# Patient Record
Sex: FEMALE | Race: WHITE | Employment: FULL TIME | URBAN - METROPOLITAN AREA
[De-identification: names, ages, dates, MRNs, and addresses within clinical notes are randomized per-mention and may not be internally consistent; named-entity substitution may affect disease eponyms.]

---

## 2017-01-03 ENCOUNTER — NURSE ONLY (OUTPATIENT)
Dept: INTERNAL MEDICINE CLINIC | Facility: CLINIC | Age: 57
End: 2017-01-03

## 2017-01-03 ENCOUNTER — APPOINTMENT (OUTPATIENT)
Dept: LAB | Age: 57
End: 2017-01-03
Attending: INTERNAL MEDICINE

## 2017-01-03 DIAGNOSIS — Z13.89 SCREENING FOR BLOOD OR PROTEIN IN URINE: ICD-10-CM

## 2017-01-03 DIAGNOSIS — Z13.89 SCREENING FOR BLOOD OR PROTEIN IN URINE: Primary | ICD-10-CM

## 2017-01-05 ENCOUNTER — OFFICE VISIT (OUTPATIENT)
Dept: INTERNAL MEDICINE CLINIC | Facility: CLINIC | Age: 57
End: 2017-01-05

## 2017-01-05 VITALS
WEIGHT: 167.75 LBS | OXYGEN SATURATION: 98 % | BODY MASS INDEX: 26.33 KG/M2 | HEIGHT: 67 IN | SYSTOLIC BLOOD PRESSURE: 126 MMHG | DIASTOLIC BLOOD PRESSURE: 78 MMHG | RESPIRATION RATE: 16 BRPM | TEMPERATURE: 98 F | HEART RATE: 76 BPM

## 2017-01-05 DIAGNOSIS — J06.9 UPPER RESPIRATORY TRACT INFECTION, UNSPECIFIED TYPE: Primary | ICD-10-CM

## 2017-01-05 PROCEDURE — 99213 OFFICE O/P EST LOW 20 MIN: CPT | Performed by: INTERNAL MEDICINE

## 2017-01-05 NOTE — PROGRESS NOTES
Omari Miller  1960 is a 64year old female who presents for upper respiratory symptoms    Patient presents with:  Swollen Glands: pt states all her glands, throat and shoulder all hurt for the last week        HPI:   Pt reports  respiratory symptom stenosis of spine    • Hyperlipidemia    • Varicose vein    • Sciatica 9/25/2013   • Rib fracture 6/30/2013   • Osteoporosis 2/18/2014   • Degeneration of lumbar or lumbosacral intervertebral disc 9/25/2013   • Spinal stenosis, lumbar region, without neuro indicates understanding of these issues and agrees to the plan. The patient is asked to Return in about 1 week (around 1/12/2017), or if symptoms worsen or fail to improve. Elisa Buerger, MD

## 2017-02-01 ENCOUNTER — TELEPHONE (OUTPATIENT)
Dept: INTERNAL MEDICINE CLINIC | Facility: CLINIC | Age: 57
End: 2017-02-01

## 2017-02-01 NOTE — TELEPHONE ENCOUNTER
MD received [facility] papers. Per VM, pt needs to come into office for papers to be filled out. LMTCB to schedule appointment.

## 2017-02-02 NOTE — TELEPHONE ENCOUNTER
Future Appointments  Date Time Provider Christelle Engel   2/3/2017 2:15 PM Rin Alejo MD [facility] [place]

## 2017-02-03 ENCOUNTER — OFFICE VISIT (OUTPATIENT)
Dept: INTERNAL MEDICINE CLINIC | Facility: CLINIC | Age: 57
End: 2017-02-03

## 2017-02-03 DIAGNOSIS — R07.89 LEFT-SIDED CHEST WALL PAIN: Primary | ICD-10-CM

## 2017-02-03 PROCEDURE — 99213 OFFICE O/P EST LOW 20 MIN: CPT | Performed by: INTERNAL MEDICINE

## 2017-02-03 RX ORDER — HYDROCODONE BITARTRATE AND ACETAMINOPHEN 10; 325 MG/1; MG/1
1 TABLET ORAL 3 TIMES DAILY
Qty: 90 TABLET | Refills: 0 | Status: SHIPPED | OUTPATIENT
Start: 2017-02-03 | End: 2017-03-02

## 2017-02-03 NOTE — PROGRESS NOTES
Anna Marie Berkowitz  1960 is a 64year old female. Patient presents with:  Paperwork:  FMLA      HPI:   FMLA form     to be filled out           Current Outpatient Prescriptions:  HYDROcodone-acetaminophen (NORCO)  MG Oral Tab Take 1 tablet by nabil intervertebral disc 9/25/2013   • Spinal stenosis, lumbar region, without neurogenic claudication 9/25/2013   • Carpal tunnel syndrome 6/20/2013     Log Date: 11/28/2012    • Recurrent pneumonia    • Migraines    • Ventral hernia 2646   • Follicular cyst o

## 2017-02-08 RX ORDER — POTASSIUM CHLORIDE 20 MEQ/1
TABLET, EXTENDED RELEASE ORAL
Qty: 30 TABLET | Refills: 0 | Status: SHIPPED | OUTPATIENT
Start: 2017-02-08 | End: 2017-07-12

## 2017-02-08 RX ORDER — FUROSEMIDE 80 MG
TABLET ORAL
Qty: 30 TABLET | Refills: 0 | Status: SHIPPED | OUTPATIENT
Start: 2017-02-08 | End: 2017-07-12

## 2017-02-10 ENCOUNTER — TELEPHONE (OUTPATIENT)
Dept: INTERNAL MEDICINE CLINIC | Facility: CLINIC | Age: 57
End: 2017-02-10

## 2017-02-10 NOTE — TELEPHONE ENCOUNTER
MD received/reviewed reasonable accommodation request forms. Forms filled out, signed by VM and pt notified that forms can be picked up at the . Pt verbalized understanding.

## 2017-03-02 ENCOUNTER — TELEPHONE (OUTPATIENT)
Dept: INTERNAL MEDICINE CLINIC | Facility: CLINIC | Age: 57
End: 2017-03-02

## 2017-03-02 DIAGNOSIS — R07.89 LEFT-SIDED CHEST WALL PAIN: Primary | ICD-10-CM

## 2017-03-02 RX ORDER — HYDROCODONE BITARTRATE AND ACETAMINOPHEN 10; 325 MG/1; MG/1
1 TABLET ORAL 3 TIMES DAILY
Qty: 90 TABLET | Refills: 0 | Status: SHIPPED | OUTPATIENT
Start: 2017-03-02 | End: 2017-04-05

## 2017-03-02 NOTE — TELEPHONE ENCOUNTER
VM: please review last OV notes below from pain management. Please indicate if you are comfortable managing pts pain medications. Thank you!    Medical Decision Making:    Diagnosis:    Left-sided chest wall pain  (primary encounter diagnosis)  Closed fract

## 2017-03-14 RX ORDER — LIDOCAINE 50 MG/G
PATCH TOPICAL
Qty: 30 PATCH | Refills: 0 | Status: SHIPPED | OUTPATIENT
Start: 2017-03-14 | End: 2017-07-12

## 2017-03-14 NOTE — TELEPHONE ENCOUNTER
rx last filled 9/21/16 for a qty of 30 patches and 0 additional refill's   Pt's last ov was on 3/2/17

## 2017-03-24 ENCOUNTER — TELEPHONE (OUTPATIENT)
Dept: INTERNAL MEDICINE CLINIC | Facility: CLINIC | Age: 57
End: 2017-03-24

## 2017-03-24 DIAGNOSIS — M79.642 LEFT HAND PAIN: Primary | ICD-10-CM

## 2017-03-24 NOTE — TELEPHONE ENCOUNTER
Pt called in looking for referral to Dr Sowmya Farias office for pain in the L hand Referral entered and approved through pts insurance company in [facility]. Pt notified.

## 2017-04-05 DIAGNOSIS — R07.89 LEFT-SIDED CHEST WALL PAIN: Primary | ICD-10-CM

## 2017-04-05 PROBLEM — M65.341 TRIGGER FINGER, RIGHT RING FINGER: Status: ACTIVE | Noted: 2017-04-05

## 2017-04-05 PROBLEM — M65.321 TRIGGER FINGER, RIGHT INDEX FINGER: Status: ACTIVE | Noted: 2017-04-05

## 2017-04-05 PROBLEM — M65.332 TRIGGER FINGER, LEFT MIDDLE FINGER: Status: ACTIVE | Noted: 2017-04-05

## 2017-04-05 PROBLEM — M65.311 TRIGGER FINGER OF RIGHT THUMB: Status: ACTIVE | Noted: 2017-04-05

## 2017-04-05 NOTE — TELEPHONE ENCOUNTER
Pt called in looking for refill on norco.     Last refill 3/2/17 #90. Medication pending, ok to refill?

## 2017-04-06 ENCOUNTER — TELEPHONE (OUTPATIENT)
Dept: INTERNAL MEDICINE CLINIC | Facility: CLINIC | Age: 57
End: 2017-04-06

## 2017-04-06 RX ORDER — HYDROCODONE BITARTRATE AND ACETAMINOPHEN 10; 325 MG/1; MG/1
1 TABLET ORAL 3 TIMES DAILY
Qty: 90 TABLET | Refills: 0 | Status: SHIPPED | OUTPATIENT
Start: 2017-04-06 | End: 2017-05-04

## 2017-05-01 PROBLEM — G89.29 OTHER CHRONIC PAIN: Status: ACTIVE | Noted: 2017-05-01

## 2017-05-04 ENCOUNTER — OFFICE VISIT (OUTPATIENT)
Dept: INTERNAL MEDICINE CLINIC | Facility: CLINIC | Age: 57
End: 2017-05-04

## 2017-05-04 VITALS
BODY MASS INDEX: 25.74 KG/M2 | TEMPERATURE: 98 F | DIASTOLIC BLOOD PRESSURE: 80 MMHG | OXYGEN SATURATION: 99 % | HEIGHT: 67 IN | WEIGHT: 164 LBS | HEART RATE: 69 BPM | SYSTOLIC BLOOD PRESSURE: 118 MMHG | RESPIRATION RATE: 15 BRPM

## 2017-05-04 DIAGNOSIS — R07.89 LEFT-SIDED CHEST WALL PAIN: Primary | ICD-10-CM

## 2017-05-04 PROCEDURE — 99214 OFFICE O/P EST MOD 30 MIN: CPT | Performed by: INTERNAL MEDICINE

## 2017-05-04 RX ORDER — CYCLOBENZAPRINE HCL 10 MG
10 TABLET ORAL 3 TIMES DAILY PRN
Qty: 30 TABLET | Refills: 0 | Status: SHIPPED | OUTPATIENT
Start: 2017-05-04 | End: 2018-03-08 | Stop reason: ALTCHOICE

## 2017-05-04 RX ORDER — HYDROCODONE BITARTRATE AND ACETAMINOPHEN 10; 325 MG/1; MG/1
1 TABLET ORAL 3 TIMES DAILY
Qty: 90 TABLET | Refills: 0 | Status: SHIPPED | OUTPATIENT
Start: 2017-05-04 | End: 2017-06-01

## 2017-05-04 RX ORDER — HYDROCODONE BITARTRATE AND ACETAMINOPHEN 10; 325 MG/1; MG/1
1 TABLET ORAL 3 TIMES DAILY
Qty: 90 TABLET | Refills: 0 | Status: CANCELLED | OUTPATIENT
Start: 2017-05-04

## 2017-05-04 NOTE — PATIENT INSTRUCTIONS
- Continue with Norco  - Take Flexeril every 8 hours as needed. Take it at night only at first to see how it affects you drowsiness wise. It was a pleasure seeing you in the clinic today.   Thank you for choosing the [facility]

## 2017-05-04 NOTE — PROGRESS NOTES
Reena Hernadez is a 64year old female. HPI:   Patient presents with:  Back Pain: left side upper back pain, started yesterday, sharp pain   Patient presents with an acute musculoskeletal complaint. Patient has been dealing with pain in left lower back. morning before breakfast., Disp: 30 tablet, Rfl: 11  •  Losartan Potassium (COZAAR) 50 MG Oral Tab, Take 50 mg by mouth daily.   , Disp: , Rfl: 2  •  SUMAtriptan Succinate (IMITREX) 50 MG Oral Tab, TAKE 1 TABLET BY MOUTH AT ONSET OF HEADACHE, MAX OF 2 TABLE (2/15/2013); drain/inject medium joint/bursa (2/15/2013); fluoroscopic guidance needle placement (2/15/2013); nakul by andrea corey [facility] 5+ yr (2/15/2013); drain/inject medium joint/bursa (3/1/2013); drain/inject medium joint/bursa (3/1/2013); nakul 12/29/2014); m-sedaj by sm phys perfrmg svc 5+ yr (N/A, 12/29/2014); injection, w/wo contrast, dx/therapeutic substance, epidural/subarachnoid; lumbar/sacral (N/A, 1/26/2015); fluor gid & loclzj ndl/cath spi dx/ther njx (N/A, 1/26/2015); inject nerv akira,p lb  BMI 25.68 kg/m2  SpO2 99%  Breastfeeding?  No  GENERAL: Alert and oriented, well developed, well nourished,in no apparent distress  HEENT: atraumatic, PERRLA, EOMI, normal lid and conjunctiva  LUNGS: clear to auscultation bilaterally, no wheezing/rubs

## 2017-05-04 NOTE — TELEPHONE ENCOUNTER
Pt looking for refill on norco  mg 1 tab TID #90. Last refill 4/6/17. Last OV 2/3/17. Pt is being seen by pain management but norco is being RX by VM. Please see TE from 3/2/17.

## 2017-05-09 ENCOUNTER — OFFICE VISIT (OUTPATIENT)
Dept: INTERNAL MEDICINE CLINIC | Facility: CLINIC | Age: 57
End: 2017-05-09

## 2017-05-09 VITALS
HEIGHT: 67 IN | WEIGHT: 164 LBS | OXYGEN SATURATION: 97 % | TEMPERATURE: 99 F | DIASTOLIC BLOOD PRESSURE: 80 MMHG | BODY MASS INDEX: 25.74 KG/M2 | HEART RATE: 70 BPM | SYSTOLIC BLOOD PRESSURE: 118 MMHG | RESPIRATION RATE: 16 BRPM

## 2017-05-09 DIAGNOSIS — J06.9 UPPER RESPIRATORY TRACT INFECTION, UNSPECIFIED TYPE: Primary | ICD-10-CM

## 2017-05-09 PROCEDURE — 99213 OFFICE O/P EST LOW 20 MIN: CPT | Performed by: INTERNAL MEDICINE

## 2017-05-09 RX ORDER — AMOXICILLIN AND CLAVULANATE POTASSIUM 500; 125 MG/1; MG/1
1 TABLET, FILM COATED ORAL 2 TIMES DAILY
Qty: 20 TABLET | Refills: 0 | Status: SHIPPED | OUTPATIENT
Start: 2017-05-09 | End: 2017-05-19

## 2017-05-09 NOTE — PROGRESS NOTES
Mike CHEEK 1960 is a 64year old female who presents for upper respiratory symptoms    Patient presents with:  Chest Congestion        HPI:   Pt reports  respiratory symptoms for  Few days   .        Current Outpatient Prescriptions:  Amoxicillin Bilateral    • Cervical stenosis of spine    • Hyperlipidemia    • Varicose vein    • Sciatica 9/25/2013   • Rib fracture 6/30/2013   • Osteoporosis 2/18/2014   • Degeneration of lumbar or lumbosacral intervertebral disc 9/25/2013   • Spinal stenosis, lumb daily. Patient Instructions   Throat lozenges plenty of fluids      The patient indicates understanding of these issues and agrees to the plan. The patient is asked to Return if symptoms worsen or fail to improve. Pablo Ch MD

## 2017-05-11 ENCOUNTER — TELEPHONE (OUTPATIENT)
Dept: INTERNAL MEDICINE CLINIC | Facility: CLINIC | Age: 57
End: 2017-05-11

## 2017-05-11 NOTE — TELEPHONE ENCOUNTER
Procedure spinal cord stem implant on May 23,2017 Dr. Phyllis Salmeron needs ov, ekg and bmp paper in dr.motianis amber vargas.

## 2017-05-18 ENCOUNTER — HOSPITAL ENCOUNTER (OUTPATIENT)
Dept: GENERAL RADIOLOGY | Age: 57
Discharge: HOME | End: 2017-05-18
Attending: INTERNAL MEDICINE
Payer: COMMERCIAL

## 2017-05-18 ENCOUNTER — OFFICE VISIT (OUTPATIENT)
Dept: INTERNAL MEDICINE CLINIC | Facility: CLINIC | Age: 57
End: 2017-05-18

## 2017-05-18 VITALS
WEIGHT: 166.5 LBS | TEMPERATURE: 98 F | HEART RATE: 77 BPM | SYSTOLIC BLOOD PRESSURE: 140 MMHG | DIASTOLIC BLOOD PRESSURE: 86 MMHG | HEIGHT: 67 IN | RESPIRATION RATE: 22 BRPM | BODY MASS INDEX: 26.13 KG/M2 | OXYGEN SATURATION: 97 %

## 2017-05-18 DIAGNOSIS — J20.9 ACUTE BRONCHITIS, UNSPECIFIED ORGANISM: Primary | ICD-10-CM

## 2017-05-18 DIAGNOSIS — J20.9 ACUTE BRONCHITIS, UNSPECIFIED ORGANISM: ICD-10-CM

## 2017-05-18 PROCEDURE — 71020 XR CHEST PA + LAT CHEST (CPT=71020): CPT | Performed by: INTERNAL MEDICINE

## 2017-05-18 PROCEDURE — 99213 OFFICE O/P EST LOW 20 MIN: CPT | Performed by: INTERNAL MEDICINE

## 2017-05-18 RX ORDER — LEVOFLOXACIN 500 MG/1
500 TABLET, FILM COATED ORAL DAILY
Qty: 10 TABLET | Refills: 0 | Status: SHIPPED | OUTPATIENT
Start: 2017-05-18 | End: 2017-05-28

## 2017-05-18 RX ORDER — ALBUTEROL SULFATE 90 UG/1
2 AEROSOL, METERED RESPIRATORY (INHALATION) EVERY 6 HOURS PRN
Qty: 2 INHALER | Refills: 0 | Status: SHIPPED | OUTPATIENT
Start: 2017-05-18 | End: 2017-06-17

## 2017-05-18 RX ORDER — PREDNISONE 1 MG/1
TABLET ORAL
Qty: 70 TABLET | Refills: 0 | Status: SHIPPED | OUTPATIENT
Start: 2017-05-18 | End: 2017-05-26

## 2017-05-18 NOTE — PROGRESS NOTES
Reena Hernadez  1960 is a 64year old female who presents for upper respiratory symptoms    Patient presents with:  Cough: URI        HPI:   Pt reports  respiratory symptoms for  Few days   Cough with yellowish expectoration no fever.        Current O Rfl: 2   Albuterol Sulfate HFA (VENTOLIN) 108 (90 BASE) MCG/ACT Inhalation Aero Soln Inhale 2 puffs into the lungs every 6 (six) hours as needed. Disp:  Rfl:    Penciclovir (DENAVIR) 1 % Apply Externally Cream Apply  topically every 2 (two) hours.  For cold without murmur  GI: good BS's,no masses, HSM or tenderness    ASSESSMENT AND PLAN:   Olamide Acosta was seen today for cough. Diagnoses and all orders for this visit:    Acute bronchitis, unspecified organism  -     levofloxacin (LEVAQUIN) 500 MG Oral Tab;  Take

## 2017-05-26 ENCOUNTER — OFFICE VISIT (OUTPATIENT)
Dept: INTERNAL MEDICINE CLINIC | Facility: CLINIC | Age: 57
End: 2017-05-26

## 2017-05-26 VITALS
BODY MASS INDEX: 26.13 KG/M2 | TEMPERATURE: 99 F | HEART RATE: 78 BPM | OXYGEN SATURATION: 97 % | DIASTOLIC BLOOD PRESSURE: 88 MMHG | SYSTOLIC BLOOD PRESSURE: 126 MMHG | WEIGHT: 166.5 LBS | RESPIRATION RATE: 16 BRPM | HEIGHT: 67 IN

## 2017-05-26 DIAGNOSIS — J20.9 ACUTE BRONCHITIS, UNSPECIFIED ORGANISM: Primary | ICD-10-CM

## 2017-05-26 PROCEDURE — 99213 OFFICE O/P EST LOW 20 MIN: CPT | Performed by: INTERNAL MEDICINE

## 2017-05-26 RX ORDER — PREDNISONE 1 MG/1
TABLET ORAL
Qty: 70 TABLET | Refills: 0 | COMMUNITY
Start: 2017-05-26 | End: 2017-06-01

## 2017-05-26 NOTE — PROGRESS NOTES
Sienna Veras  1960 is a 64year old female who presents for upper respiratory symptoms    Patient presents with:   Follow - Up        HPI:   Much better      Current Outpatient Prescriptions:  predniSONE 5 MG Oral Tab 10 mg twice daily for 3 days, 5 % Apply Externally Cream Apply  topically every 2 (two) hours.  For cold sores only as needed Disp:  Rfl:       Past Medical History   Diagnosis Date   • Esophageal reflux    • Renal stones    • DJD (degenerative joint disease) of hip      Bilateral    • Ce organism        Patient Instructions   Return to clinic prn if no better         The patient indicates understanding of these issues and agrees to the plan. The patient is asked to Return if symptoms worsen or fail to improve. Emilie Gonzalez MD

## 2017-06-01 ENCOUNTER — OFFICE VISIT (OUTPATIENT)
Dept: INTERNAL MEDICINE CLINIC | Facility: CLINIC | Age: 57
End: 2017-06-01

## 2017-06-01 VITALS
RESPIRATION RATE: 16 BRPM | DIASTOLIC BLOOD PRESSURE: 84 MMHG | TEMPERATURE: 98 F | HEIGHT: 67 IN | WEIGHT: 166.5 LBS | BODY MASS INDEX: 26.13 KG/M2 | SYSTOLIC BLOOD PRESSURE: 126 MMHG | OXYGEN SATURATION: 96 % | HEART RATE: 82 BPM

## 2017-06-01 DIAGNOSIS — J20.9 ACUTE BRONCHITIS, UNSPECIFIED ORGANISM: ICD-10-CM

## 2017-06-01 DIAGNOSIS — Z01.818 PREOP EXAM FOR INTERNAL MEDICINE: Primary | ICD-10-CM

## 2017-06-01 PROCEDURE — 99213 OFFICE O/P EST LOW 20 MIN: CPT | Performed by: INTERNAL MEDICINE

## 2017-06-01 PROCEDURE — 93000 ELECTROCARDIOGRAM COMPLETE: CPT | Performed by: INTERNAL MEDICINE

## 2017-06-01 RX ORDER — LEVOFLOXACIN 500 MG/1
500 TABLET, FILM COATED ORAL DAILY
Qty: 10 TABLET | Refills: 0 | Status: SHIPPED | OUTPATIENT
Start: 2017-06-01 | End: 2017-06-11

## 2017-06-01 RX ORDER — PREDNISONE 1 MG/1
TABLET ORAL
Qty: 70 TABLET | Refills: 0 | Status: SHIPPED | OUTPATIENT
Start: 2017-06-01 | End: 2017-10-19 | Stop reason: ALTCHOICE

## 2017-06-01 NOTE — TELEPHONE ENCOUNTER
[facility] calling requesting med refill for Penciclovir (DENAVIR) 1 % Apply Externally Cream

## 2017-06-01 NOTE — PROGRESS NOTES
Shital Clifford  1960 is a 64year old female.     Patient presents with:  Pre-Op Exam: spinal cord stin inplant, 17, Dr Jamie Arguello      HPI:   He  has had previous anesthesia:  yes  Previous complications:  none     Current Outpatient Prescriptions: For cold sores only as needed Disp:  Rfl:       Past Medical History   Diagnosis Date   • Esophageal reflux    • Renal stones    • DJD (degenerative joint disease) of hip      Bilateral    • Cervical stenosis of spine    • Hyperlipidemia    • Varicose vein Palpitations none. PND (paroxsymal nocturnal dyspnea) none.    Gastrointestinal:   Patient denies abdominal pain, blood in stool, constipation, diarrhea, difficulty swallowing, change in stools, heartburn, nausea, vomiting no weight changes noted no heart b medicine  -     Comp Metabolic Panel (14); Future  -     EKG with interpretation and Report -IN OFFICE [20470]    Acute bronchitis, unspecified organism  -     levofloxacin (LEVAQUIN) 500 MG Oral Tab; Take 1 tablet (500 mg total) by mouth daily.   -     pre

## 2017-06-05 ENCOUNTER — TELEPHONE (OUTPATIENT)
Dept: INTERNAL MEDICINE CLINIC | Facility: CLINIC | Age: 57
End: 2017-06-05

## 2017-06-05 RX ORDER — FLUCONAZOLE 100 MG/1
100 TABLET ORAL DAILY
Qty: 3 TABLET | Refills: 0 | Status: SHIPPED | OUTPATIENT
Start: 2017-06-05 | End: 2017-06-08

## 2017-06-05 NOTE — TELEPHONE ENCOUNTER
Pt called in stating that since she has been taking abx and prednisone she has developed thrush. Pt is looking for medication to be RX. Allergies to motrin and IV dye.  Pt using [facility]

## 2017-06-21 ENCOUNTER — OFFICE VISIT (OUTPATIENT)
Dept: INTERNAL MEDICINE CLINIC | Facility: CLINIC | Age: 57
End: 2017-06-21

## 2017-06-21 VITALS
RESPIRATION RATE: 20 BRPM | HEIGHT: 67 IN | SYSTOLIC BLOOD PRESSURE: 120 MMHG | DIASTOLIC BLOOD PRESSURE: 82 MMHG | HEART RATE: 96 BPM | WEIGHT: 168.5 LBS | BODY MASS INDEX: 26.45 KG/M2 | TEMPERATURE: 98 F | OXYGEN SATURATION: 99 %

## 2017-06-21 DIAGNOSIS — J31.0 CHRONIC RHINITIS: Primary | ICD-10-CM

## 2017-06-21 PROCEDURE — 99213 OFFICE O/P EST LOW 20 MIN: CPT | Performed by: PHYSICIAN ASSISTANT

## 2017-06-21 RX ORDER — FLUTICASONE PROPIONATE 50 MCG
2 SPRAY, SUSPENSION (ML) NASAL DAILY
Qty: 1 BOTTLE | Refills: 0 | Status: SHIPPED | OUTPATIENT
Start: 2017-06-21

## 2017-06-21 RX ORDER — AZELASTINE 1 MG/ML
2 SPRAY, METERED NASAL 2 TIMES DAILY
Qty: 1 BOTTLE | Refills: 0 | Status: ON HOLD | OUTPATIENT
Start: 2017-06-21 | End: 2018-03-12

## 2017-06-21 NOTE — PROGRESS NOTES
HPI:  Gia Olivo is a 64year old female who presents for sinus symptoms x 1 month. C/o ears feeling full, decreased/muffled hearing, feels like she's on an airplane, bilat ear pain, nasal congestion, sinus pressure, frontal headache.   Denies fever, chi Griffin Montelongo MD;  Location: [facility] [address]    DRAIN/INJECT MEDIUM JOINT/BURSA  3/29/2012    Comment Procedure: COCCYX;  Surgeon: Alexis Gardiner MD;  Location: [facility] [address]    DRAIN/INJECT MEDIUM JOINT/BURSA  3/29/2012    Comme Surgeon: Anthony Simons MD;  Location: [facility]    M-SEDAJ BY [facility] [address] [facility] [address]  N [place] 5+ YR  3/1/2013    Comment Procedure: COCCYX;  Surgeon: Anthony Simons MD;  Location: [facility] PAIN MANAGEMENT    INJECTION, ANESTHETIC/STEROID, MANAGEMENT    FLUOR AUGUSTO & Kimmy Pelletier NDL/CATH SPI DX/THER NJX  11/8/2013    Comment Procedure: COCCYX;  Surgeon: Renetta Guan MD;  Location: [address] [place] [address] BY [facility] [address] [facility] 59  N [facility] 5+ YR  11/8/2013    Comment Procedure: LUMBAR EPIDU N/A 12/12/2014    Comment Procedure: STELLATE GANGLION INJECTION;  Surgeon: Earle Ibanez MD;  Location: [address] [place] [address] N/A 12/29/2014    Comment Procedure: LUMBAR SYMPATHETIC INJECTION;  Surgeon: Maame Avendano ARTHROCENTESIS ASPIR&/INJ MAJOR JT/BURSA W/US N/A 3/26/2015    Comment Procedure: HIP INJECTION (PAIN);   Surgeon: Yasir Dale MD;  Location: [address] [place] [address] UNLISTED  3/26/2015    Comment Procedure: GANGLION IMP Procedure: CAUDAL;  Surgeon: Clovis Samson MD;  Location: [address] [place] [address] N/A 12/22/2015    Comment Procedure: GANGLION IMPAR BLOCK;  Surgeon: Clovis Samson MD;  Location: [address] [place] [address] Astelin. F/u with ENT if no improvement as she has already been on Augmentin, Levaquin, and two rounds of oral steroids. The patient indicates understanding of these issues and agrees to the plan.   The patient is asked to return in 2 weeks to see Dr. Janice Elliott

## 2017-06-21 NOTE — PATIENT INSTRUCTIONS
Please start Flonase - 2 sprays in each nostril daily. Please start Astelin - 2 sprays in each nostril TWICE a day.     - may take tylenol (acetaminophen) 1,000 mg every 8 hours as needed (do not exceed 4,000 mg daily)  - may take ibuprofen 600 mg every

## 2017-06-29 ENCOUNTER — TELEPHONE (OUTPATIENT)
Dept: INTERNAL MEDICINE CLINIC | Facility: CLINIC | Age: 57
End: 2017-06-29

## 2017-06-29 DIAGNOSIS — R07.89 LEFT-SIDED CHEST WALL PAIN: ICD-10-CM

## 2017-06-29 RX ORDER — HYDROCODONE BITARTRATE AND ACETAMINOPHEN 10; 325 MG/1; MG/1
1 TABLET ORAL 3 TIMES DAILY
Qty: 90 TABLET | Refills: 0 | Status: SHIPPED | OUTPATIENT
Start: 2017-06-29 | End: 2017-08-01

## 2017-07-13 RX ORDER — FUROSEMIDE 80 MG
TABLET ORAL
Qty: 30 TABLET | Refills: 0 | Status: SHIPPED | OUTPATIENT
Start: 2017-07-13 | End: 2017-10-02

## 2017-07-13 RX ORDER — POTASSIUM CHLORIDE 20 MEQ/1
TABLET, EXTENDED RELEASE ORAL
Qty: 30 TABLET | Refills: 6 | Status: SHIPPED | OUTPATIENT
Start: 2017-07-13 | End: 2019-07-09

## 2017-07-13 RX ORDER — LIDOCAINE 50 MG/G
PATCH TOPICAL
Qty: 30 PATCH | Refills: 0 | Status: SHIPPED | OUTPATIENT
Start: 2017-07-13 | End: 2019-08-27

## 2017-08-01 ENCOUNTER — TELEPHONE (OUTPATIENT)
Dept: INTERNAL MEDICINE CLINIC | Facility: CLINIC | Age: 57
End: 2017-08-01

## 2017-08-01 DIAGNOSIS — R07.89 LEFT-SIDED CHEST WALL PAIN: ICD-10-CM

## 2017-08-01 RX ORDER — HYDROCODONE BITARTRATE AND ACETAMINOPHEN 10; 325 MG/1; MG/1
1 TABLET ORAL 3 TIMES DAILY
Qty: 90 TABLET | Refills: 0 | Status: SHIPPED | OUTPATIENT
Start: 2017-08-01 | End: 2017-08-29

## 2017-08-29 ENCOUNTER — TELEPHONE (OUTPATIENT)
Dept: INTERNAL MEDICINE CLINIC | Facility: CLINIC | Age: 57
End: 2017-08-29

## 2017-08-29 DIAGNOSIS — R07.89 LEFT-SIDED CHEST WALL PAIN: ICD-10-CM

## 2017-08-29 RX ORDER — HYDROCODONE BITARTRATE AND ACETAMINOPHEN 10; 325 MG/1; MG/1
1 TABLET ORAL 3 TIMES DAILY
Qty: 90 TABLET | Refills: 0 | Status: SHIPPED | OUTPATIENT
Start: 2017-08-29 | End: 2017-09-28

## 2017-09-28 DIAGNOSIS — R07.89 LEFT-SIDED CHEST WALL PAIN: ICD-10-CM

## 2017-09-28 RX ORDER — HYDROCODONE BITARTRATE AND ACETAMINOPHEN 10; 325 MG/1; MG/1
1 TABLET ORAL 3 TIMES DAILY
Qty: 90 TABLET | Refills: 0 | Status: SHIPPED | OUTPATIENT
Start: 2017-09-28 | End: 2017-10-02

## 2017-10-02 ENCOUNTER — TELEPHONE (OUTPATIENT)
Dept: INTERNAL MEDICINE CLINIC | Facility: CLINIC | Age: 57
End: 2017-10-02

## 2017-10-02 RX ORDER — FUROSEMIDE 80 MG
TABLET ORAL
Qty: 30 TABLET | Refills: 0 | Status: SHIPPED | OUTPATIENT
Start: 2017-10-02 | End: 2019-07-09

## 2017-10-02 RX ORDER — PANTOPRAZOLE SODIUM 40 MG/1
TABLET, DELAYED RELEASE ORAL
Qty: 30 TABLET | Refills: 1 | Status: SHIPPED | OUTPATIENT
Start: 2017-10-02 | End: 2017-11-27

## 2017-10-02 NOTE — TELEPHONE ENCOUNTER
Is patient's perscription for Hydrocodone been approved and ready to ?   Call patient at [phone]

## 2017-10-19 ENCOUNTER — OFFICE VISIT (OUTPATIENT)
Dept: INTERNAL MEDICINE CLINIC | Facility: CLINIC | Age: 57
End: 2017-10-19

## 2017-10-19 VITALS
RESPIRATION RATE: 16 BRPM | HEIGHT: 67 IN | DIASTOLIC BLOOD PRESSURE: 82 MMHG | TEMPERATURE: 98 F | WEIGHT: 168 LBS | HEART RATE: 67 BPM | OXYGEN SATURATION: 98 % | BODY MASS INDEX: 26.37 KG/M2 | SYSTOLIC BLOOD PRESSURE: 126 MMHG

## 2017-10-19 DIAGNOSIS — R05.9 COUGH: Primary | ICD-10-CM

## 2017-10-19 PROCEDURE — 99213 OFFICE O/P EST LOW 20 MIN: CPT | Performed by: INTERNAL MEDICINE

## 2017-10-19 RX ORDER — LEVOFLOXACIN 500 MG/1
500 TABLET, FILM COATED ORAL DAILY
Qty: 10 TABLET | Refills: 0 | Status: SHIPPED | OUTPATIENT
Start: 2017-10-19 | End: 2017-10-29

## 2017-10-19 RX ORDER — METHYLPREDNISOLONE 4 MG/1
TABLET ORAL
Qty: 1 KIT | Refills: 0 | Status: SHIPPED | OUTPATIENT
Start: 2017-10-19 | End: 2017-11-27 | Stop reason: ALTCHOICE

## 2017-10-19 NOTE — PROGRESS NOTES
Chela Rubio  1960 is a 62year old female who presents for upper respiratory symptoms    Patient presents with:  Nasal Congestion        HPI:   Pt reports  respiratory symptoms for 3 days. Cough with wheezing yellowish expectoration.        Gerhardt Manos needed for Nausea.  Disp: 20 tablet Rfl: 2      Past Medical History:   Diagnosis Date   • Carpal tunnel syndrome 6/20/2013    Log Date: 11/28/2012    • Cervical stenosis of spine    • Degeneration of lumbar or lumbosacral intervertebral disc 9/25/2013   • or tenderness    ASSESSMENT AND PLAN:   Staci Romero was seen today for nasal congestion. Diagnoses and all orders for this visit:    Cough  -     methylPREDNISolone (MEDROL) 4 MG Oral Tablet Therapy Pack; As directed.   -     levofloxacin (LEVAQUIN) 500 MG Ora

## 2017-10-25 ENCOUNTER — TELEPHONE (OUTPATIENT)
Dept: INTERNAL MEDICINE CLINIC | Facility: CLINIC | Age: 57
End: 2017-10-25

## 2017-10-25 NOTE — TELEPHONE ENCOUNTER
.Reason for the order/referral:Nerve Stimulator   PCP: Jalen Joe   Refer to Provider: Marianne Lainez   Specialty:Neuro-Surgeon   Patient Insurance: Payor: [facility] DOMENICO/ADVCAP / Plan:  BA / Product Type: HMO /   Has the patient been seen by their PCP for this condition:

## 2017-10-26 DIAGNOSIS — R07.89 LEFT-SIDED CHEST WALL PAIN: ICD-10-CM

## 2017-10-30 ENCOUNTER — TELEPHONE (OUTPATIENT)
Dept: INTERNAL MEDICINE CLINIC | Facility: CLINIC | Age: 57
End: 2017-10-30

## 2017-10-30 RX ORDER — HYDROCODONE BITARTRATE AND ACETAMINOPHEN 10; 325 MG/1; MG/1
1 TABLET ORAL 3 TIMES DAILY
Qty: 90 TABLET | Refills: 0 | Status: SHIPPED | OUTPATIENT
Start: 2017-10-30 | End: 2017-11-27

## 2017-10-30 NOTE — TELEPHONE ENCOUNTER
Pt stated that she does not know who is I network or who will perform nerve stimulator as she has been provided with numerous different neurosurgeons and none of them are in network or do nerve stimulator.      Provided with list of all neurosurgeons and in

## 2017-11-09 ENCOUNTER — TELEPHONE (OUTPATIENT)
Dept: INTERNAL MEDICINE CLINIC | Facility: CLINIC | Age: 57
End: 2017-11-09

## 2017-11-09 NOTE — TELEPHONE ENCOUNTER
Advised to go to IC to assist with removal or she could try to soak foot in warm water to bring glass to surface.

## 2017-11-27 ENCOUNTER — OFFICE VISIT (OUTPATIENT)
Dept: INTERNAL MEDICINE CLINIC | Facility: CLINIC | Age: 57
End: 2017-11-27

## 2017-11-27 VITALS
RESPIRATION RATE: 19 BRPM | BODY MASS INDEX: 27.23 KG/M2 | HEIGHT: 67 IN | TEMPERATURE: 98 F | DIASTOLIC BLOOD PRESSURE: 90 MMHG | WEIGHT: 173.5 LBS | SYSTOLIC BLOOD PRESSURE: 140 MMHG | HEART RATE: 78 BPM

## 2017-11-27 DIAGNOSIS — K21.9 GASTROESOPHAGEAL REFLUX DISEASE, ESOPHAGITIS PRESENCE NOT SPECIFIED: Chronic | ICD-10-CM

## 2017-11-27 DIAGNOSIS — M51.37 DEGENERATION OF LUMBAR OR LUMBOSACRAL INTERVERTEBRAL DISC: Chronic | ICD-10-CM

## 2017-11-27 DIAGNOSIS — R45.89 DEPRESSED MOOD: ICD-10-CM

## 2017-11-27 DIAGNOSIS — E78.00 HYPERCHOLESTEREMIA: ICD-10-CM

## 2017-11-27 DIAGNOSIS — M53.3 COCCYDYNIA: Primary | Chronic | ICD-10-CM

## 2017-11-27 PROCEDURE — 99214 OFFICE O/P EST MOD 30 MIN: CPT | Performed by: INTERNAL MEDICINE

## 2017-11-27 RX ORDER — ATORVASTATIN CALCIUM 10 MG/1
10 TABLET, FILM COATED ORAL DAILY
Qty: 90 TABLET | Refills: 1 | Status: SHIPPED | OUTPATIENT
Start: 2017-11-27 | End: 2018-11-27

## 2017-11-27 RX ORDER — HYDROCODONE BITARTRATE AND ACETAMINOPHEN 10; 325 MG/1; MG/1
1 TABLET ORAL 3 TIMES DAILY
Qty: 90 TABLET | Refills: 0 | Status: SHIPPED | OUTPATIENT
Start: 2017-11-27 | End: 2017-12-26

## 2017-11-27 RX ORDER — PANTOPRAZOLE SODIUM 40 MG/1
TABLET, DELAYED RELEASE ORAL
Qty: 30 TABLET | Refills: 2 | Status: SHIPPED | OUTPATIENT
Start: 2017-11-27 | End: 2018-05-29

## 2017-11-27 NOTE — PROGRESS NOTES
Reena Hernadez is a 62year old female. HPI:   Patient presents with:  Medication Follow-Up  Referral: for  [phone] fax [phone]  Patient presents for follow up on chronic medical issues.     Continues to have chronic lumbar and sacra Rfl: 0  •  Azelastine HCl 0.1 % Nasal Solution, 2 sprays by Nasal route 2 (two) times daily. , Disp: 1 Bottle, Rfl: 0  •  Penciclovir (DENAVIR) 1 % External Cream, Apply  topically every 2 (two) hours.  For cold sores only as needed, Disp: 5 g, Rfl: 0  •  Cy maria antoniag [facility] 5+ yr (3/29/2012); fluoroscopic guidance needle placement (3/29/2012); drain/inject medium joint/bursa (1/25/2013); fluoroscopic guidance needle placement (1/25/2013); nakul [facility] (1/25/2013); drain/inject medium joint/ phys perfrmg svc 5+ yr (N/A, 12/12/2014); inject nerv blck,paravert sympath (N/A, 12/29/2014); injection, w/wo contrast, dx/therapeutic substance, epidural/subarachnoid; lumbar/sacral (N/A, 12/29/2014); fluor gid & loclzj ndl/cath spi dx/ther njx (N/A, 12/ drink alcohol or use drugs.   Wt Readings from Last 6 Encounters:  11/27/17 : 173 lb 8 oz  10/19/17 : 168 lb  06/21/17 : 168 lb 8 oz  06/01/17 : 166 lb 8 oz  05/26/17 : 166 lb 8 oz  05/18/17 : 166 lb 8 oz    EXAM:   /90   Pulse 78   Temp 98.3 °F (36.8 follow up on chronic issues/CPX, or earlier if acute issues arise.     Shawn Conley MD

## 2017-11-27 NOTE — PATIENT INSTRUCTIONS
- Follow up with Dr. Tiffany Martinez next month  - Continue current medications  - Follow up with Dr. Clotilde De Dios as scheduled. It was a pleasure seeing you in the clinic today.   Thank you for choosing the [facility] office for your healthcare ne

## 2017-11-27 NOTE — PROGRESS NOTES
Addendum to assessment/plan:  5. Depressed mood  Feeling more stressed/depressed lately, having issues with son, additionally her mother (lives in [place]) is sick, patient is shuttling back and forth to visit mother.   Patient interested in seeing psychia

## 2017-12-21 ENCOUNTER — TELEPHONE (OUTPATIENT)
Dept: INTERNAL MEDICINE CLINIC | Facility: CLINIC | Age: 57
End: 2017-12-21

## 2017-12-21 NOTE — TELEPHONE ENCOUNTER
Patient has a nurse question and also would like to know when her last MRI was done and for what. Wanted to speak to Dr. LANG [facility] but I told her he's seeing patients and so she wanted to speak to nurse.

## 2017-12-26 ENCOUNTER — TELEPHONE (OUTPATIENT)
Dept: INTERNAL MEDICINE CLINIC | Facility: CLINIC | Age: 57
End: 2017-12-26

## 2017-12-26 RX ORDER — HYDROCODONE BITARTRATE AND ACETAMINOPHEN 10; 325 MG/1; MG/1
1 TABLET ORAL 3 TIMES DAILY
Qty: 90 TABLET | Refills: 0 | Status: SHIPPED | OUTPATIENT
Start: 2017-12-26 | End: 2018-01-25

## 2017-12-26 NOTE — TELEPHONE ENCOUNTER
Pt wanting to know what type of MRI she last had done. Informed she had a MRI lumbar spine performed 1/2017. Understanding verbalized.

## 2018-01-02 ENCOUNTER — TELEPHONE (OUTPATIENT)
Dept: INTERNAL MEDICINE CLINIC | Facility: CLINIC | Age: 58
End: 2018-01-02

## 2018-01-02 RX ORDER — LEVOFLOXACIN 500 MG/1
500 TABLET, FILM COATED ORAL DAILY
Qty: 10 TABLET | Refills: 0 | Status: SHIPPED | OUTPATIENT
Start: 2018-01-02 | End: 2018-01-12

## 2018-01-25 ENCOUNTER — OFFICE VISIT (OUTPATIENT)
Dept: INTERNAL MEDICINE CLINIC | Facility: CLINIC | Age: 58
End: 2018-01-25

## 2018-01-25 VITALS
HEART RATE: 79 BPM | WEIGHT: 173 LBS | TEMPERATURE: 98 F | BODY MASS INDEX: 27.15 KG/M2 | HEIGHT: 67 IN | SYSTOLIC BLOOD PRESSURE: 120 MMHG | DIASTOLIC BLOOD PRESSURE: 86 MMHG | RESPIRATION RATE: 16 BRPM | OXYGEN SATURATION: 99 %

## 2018-01-25 DIAGNOSIS — Z00.00 ROUTINE GENERAL MEDICAL EXAMINATION AT A HEALTH CARE FACILITY: Primary | ICD-10-CM

## 2018-01-25 DIAGNOSIS — M81.0 OSTEOPOROSIS, UNSPECIFIED OSTEOPOROSIS TYPE, UNSPECIFIED PATHOLOGICAL FRACTURE PRESENCE: Chronic | ICD-10-CM

## 2018-01-25 DIAGNOSIS — G56.40 COMPLEX REGIONAL PAIN SYNDROME TYPE 2, AFFECTING UNSPECIFIED SITE: ICD-10-CM

## 2018-01-25 DIAGNOSIS — G62.9 NEUROPATHY: Chronic | ICD-10-CM

## 2018-01-25 PROCEDURE — 99396 PREV VISIT EST AGE 40-64: CPT | Performed by: INTERNAL MEDICINE

## 2018-01-25 RX ORDER — HYDROCODONE BITARTRATE AND ACETAMINOPHEN 10; 325 MG/1; MG/1
1 TABLET ORAL 3 TIMES DAILY
Qty: 90 TABLET | Refills: 0 | Status: SHIPPED | OUTPATIENT
Start: 2018-01-25 | End: 2018-02-27

## 2018-01-25 NOTE — PROGRESS NOTES
Dede Huitron  1960 is a 62year old female.     Patient presents with:  Physical: Est Pt. complete physical      HPI:     See below     Current Outpatient Prescriptions:  HYDROcodone-acetaminophen (NORCO)  MG Oral Tab Take 1 tablet by mouth 3 2/18/2014   • Other chronic pain 5/1/2017   • Recurrent pneumonia    • Renal stones    • Rib fracture 6/30/2013   • Sciatica 9/25/2013   • Spinal stenosis, lumbar region, without neurogenic claudication 9/25/2013   • Trigger finger of right thumb 4/5/2017 stools, nausea, vomiting no weight changes noted no heart burn noted. Hematology:   Patient denies abnormal bleeding, easy bleeding, easy bruising. Enlarged lymph nodes none. Women Only:   Patient denies breast pain. axillary nodes .  Breast lumps or dis normal.   Hernia: absent. Inguinal nodes: none. Liver, Spleen: non-enlarged. Rebound tenderness: absent. Tenderness: absent . EXTREMITIES:   Clubbing: none. Cyanosis: absent . Edema: none.    Pulses: present, bilateral.   Tremors: no.   Var[place] patient indicates understanding of these issues and agrees to the plan. The patient is asked to Return in about 6 months (around 7/25/2018).   Lana Powers MD

## 2018-01-29 ENCOUNTER — TELEPHONE (OUTPATIENT)
Dept: INTERNAL MEDICINE CLINIC | Facility: CLINIC | Age: 58
End: 2018-01-29

## 2018-01-29 NOTE — TELEPHONE ENCOUNTER
Please let patient know that she needs to see me to have the cannabis form filled out I did some research on it and will need a doctor's visit

## 2018-01-29 NOTE — TELEPHONE ENCOUNTER
Pt informed of all information listed below. She stated that she will discuss further when she comes in with her  today for his appointment. I advised her that she may need her own appointment. Understanding verbalized.

## 2018-01-30 ENCOUNTER — HOSPITAL ENCOUNTER (OUTPATIENT)
Dept: BONE DENSITY | Age: 58
Discharge: HOME | End: 2018-01-30
Attending: INTERNAL MEDICINE
Payer: COMMERCIAL

## 2018-01-30 ENCOUNTER — HOSPITAL ENCOUNTER (OUTPATIENT)
Dept: MAMMOGRAPHY | Age: 58
Discharge: HOME | End: 2018-01-30
Attending: INTERNAL MEDICINE
Payer: COMMERCIAL

## 2018-01-30 DIAGNOSIS — Z00.00 ROUTINE GENERAL MEDICAL EXAMINATION AT A HEALTH CARE FACILITY: ICD-10-CM

## 2018-01-30 DIAGNOSIS — M81.0 OSTEOPOROSIS, UNSPECIFIED OSTEOPOROSIS TYPE, UNSPECIFIED PATHOLOGICAL FRACTURE PRESENCE: Chronic | ICD-10-CM

## 2018-01-30 PROCEDURE — 77067 SCR MAMMO BI INCL CAD: CPT | Performed by: INTERNAL MEDICINE

## 2018-01-30 PROCEDURE — 77080 DXA BONE DENSITY AXIAL: CPT | Performed by: INTERNAL MEDICINE

## 2018-02-06 ENCOUNTER — LAB ENCOUNTER (OUTPATIENT)
Dept: LAB | Age: 58
End: 2018-02-06
Attending: INTERNAL MEDICINE
Payer: COMMERCIAL

## 2018-02-06 DIAGNOSIS — Z00.00 ROUTINE GENERAL MEDICAL EXAMINATION AT A HEALTH CARE FACILITY: ICD-10-CM

## 2018-02-06 DIAGNOSIS — Z01.818 PREOP EXAM FOR INTERNAL MEDICINE: ICD-10-CM

## 2018-02-06 DIAGNOSIS — M81.0 OSTEOPOROSIS, UNSPECIFIED OSTEOPOROSIS TYPE, UNSPECIFIED PATHOLOGICAL FRACTURE PRESENCE: Chronic | ICD-10-CM

## 2018-02-06 LAB
25-HYDROXYVITAMIN D (TOTAL): 32 NG/ML (ref 30–100)
ALBUMIN SERPL-MCNC: 3.8 G/DL (ref 3.5–4.8)
ALP LIVER SERPL-CCNC: 96 U/L (ref 46–118)
ALT SERPL-CCNC: 28 U/L (ref 14–54)
AST SERPL-CCNC: 16 U/L (ref 15–41)
BASOPHILS # BLD AUTO: 0.04 X10(3) UL (ref 0–0.1)
BASOPHILS NFR BLD AUTO: 0.5 %
BILIRUB SERPL-MCNC: 0.6 MG/DL (ref 0.1–2)
BUN BLD-MCNC: 14 MG/DL (ref 8–20)
CALCIUM BLD-MCNC: 9.1 MG/DL (ref 8.3–10.3)
CHLORIDE: 109 MMOL/L (ref 101–111)
CHOLEST SMN-MCNC: 177 MG/DL (ref ?–200)
CO2: 28 MMOL/L (ref 22–32)
CREAT BLD-MCNC: 0.8 MG/DL (ref 0.55–1.02)
EOSINOPHIL # BLD AUTO: 0.12 X10(3) UL (ref 0–0.3)
EOSINOPHIL NFR BLD AUTO: 1.5 %
ERYTHROCYTE [DISTWIDTH] IN BLOOD BY AUTOMATED COUNT: 12.5 % (ref 11.5–16)
EST. AVERAGE GLUCOSE BLD GHB EST-MCNC: 120 MG/DL (ref 68–126)
GLUCOSE BLD-MCNC: 89 MG/DL (ref 70–99)
HBA1C MFR BLD HPLC: 5.8 % (ref ?–5.7)
HCT VFR BLD AUTO: 42.7 % (ref 34–50)
HDLC SERPL-MCNC: 47 MG/DL (ref 45–?)
HDLC SERPL: 3.77 {RATIO} (ref ?–4.44)
HGB BLD-MCNC: 13.8 G/DL (ref 12–16)
IMMATURE GRANULOCYTE COUNT: 0.01 X10(3) UL (ref 0–1)
IMMATURE GRANULOCYTE RATIO %: 0.1 %
LDLC SERPL CALC-MCNC: 105 MG/DL (ref ?–130)
LYMPHOCYTES # BLD AUTO: 3.27 X10(3) UL (ref 0.9–4)
LYMPHOCYTES NFR BLD AUTO: 40.7 %
M PROTEIN MFR SERPL ELPH: 7.3 G/DL (ref 6.1–8.3)
MCH RBC QN AUTO: 29.8 PG (ref 27–33.2)
MCHC RBC AUTO-ENTMCNC: 32.3 G/DL (ref 31–37)
MCV RBC AUTO: 92.2 FL (ref 81–100)
MONOCYTES # BLD AUTO: 0.59 X10(3) UL (ref 0.1–0.6)
MONOCYTES NFR BLD AUTO: 7.3 %
NEUTROPHIL ABS PRELIM: 4.01 X10 (3) UL (ref 1.3–6.7)
NEUTROPHILS # BLD AUTO: 4.01 X10(3) UL (ref 1.3–6.7)
NEUTROPHILS NFR BLD AUTO: 49.9 %
NONHDLC SERPL-MCNC: 130 MG/DL (ref ?–130)
PLATELET # BLD AUTO: 360 10(3)UL (ref 150–450)
POTASSIUM SERPL-SCNC: 4.6 MMOL/L (ref 3.6–5.1)
RBC # BLD AUTO: 4.63 X10(6)UL (ref 3.8–5.1)
RED CELL DISTRIBUTION WIDTH-SD: 42.5 FL (ref 35.1–46.3)
SODIUM SERPL-SCNC: 144 MMOL/L (ref 136–144)
THYROXINE (T4): 8.6 UG/DL (ref 4.5–10.9)
TRIGL SERPL-MCNC: 124 MG/DL (ref ?–150)
TSI SER-ACNC: 2.09 MIU/ML (ref 0.35–5.5)
VLDLC SERPL CALC-MCNC: 25 MG/DL (ref 5–40)
WBC # BLD AUTO: 8 X10(3) UL (ref 4–13)

## 2018-02-06 PROCEDURE — 83036 HEMOGLOBIN GLYCOSYLATED A1C: CPT

## 2018-02-06 PROCEDURE — 85025 COMPLETE CBC W/AUTO DIFF WBC: CPT

## 2018-02-06 PROCEDURE — 84436 ASSAY OF TOTAL THYROXINE: CPT

## 2018-02-06 PROCEDURE — 84443 ASSAY THYROID STIM HORMONE: CPT

## 2018-02-06 PROCEDURE — 80053 COMPREHEN METABOLIC PANEL: CPT

## 2018-02-06 PROCEDURE — 80061 LIPID PANEL: CPT

## 2018-02-06 PROCEDURE — 82306 VITAMIN D 25 HYDROXY: CPT

## 2018-02-06 PROCEDURE — 36415 COLL VENOUS BLD VENIPUNCTURE: CPT

## 2018-02-08 ENCOUNTER — TELEPHONE (OUTPATIENT)
Dept: INTERNAL MEDICINE CLINIC | Facility: CLINIC | Age: 58
End: 2018-02-08

## 2018-02-08 NOTE — TELEPHONE ENCOUNTER
Paperwork ready and faxed to . Confirmation received. Pt notified and requested a copy to be left at the .      Copy left per pts request.

## 2018-02-12 ENCOUNTER — OFFICE VISIT (OUTPATIENT)
Dept: INTERNAL MEDICINE CLINIC | Facility: CLINIC | Age: 58
End: 2018-02-12

## 2018-02-12 VITALS
SYSTOLIC BLOOD PRESSURE: 136 MMHG | OXYGEN SATURATION: 94 % | DIASTOLIC BLOOD PRESSURE: 80 MMHG | HEART RATE: 80 BPM | RESPIRATION RATE: 16 BRPM | TEMPERATURE: 98 F

## 2018-02-12 DIAGNOSIS — M85.88 OSTEOPENIA OF SPINE: Primary | ICD-10-CM

## 2018-02-12 PROCEDURE — 99213 OFFICE O/P EST LOW 20 MIN: CPT | Performed by: INTERNAL MEDICINE

## 2018-02-12 RX ORDER — ALENDRONATE SODIUM 70 MG/1
70 TABLET ORAL WEEKLY
Qty: 13 TABLET | Refills: 3 | Status: SHIPPED | OUTPATIENT
Start: 2018-02-12 | End: 2018-05-13

## 2018-02-12 NOTE — PATIENT INSTRUCTIONS
What Is Osteoporosis? Osteoporosis is a disease that weakens the bones. Weakened bones are more likely to fracture (break). Osteoporosis affects men and women, but postmenopausal women are most at risk.  To help prevent osteoporosis, you need to exercise ©2007 [employer] [address] [facility] [address] [place]. All Rights reserved. This information is not intended as a substitute for professional medical care. Always follow your healthcare provider’s instructions.         Preventing Osteop

## 2018-02-12 NOTE — PROGRESS NOTES
Camelia Santizo  1960 is a 62year old female. Patient presents with:   Follow - Up: Results      HPI:   Lab discussion DEXA scan    Current Outpatient Prescriptions:  Alendronate Sodium (FOSAMAX) 70 MG Oral Tab Take 1 tablet (70 mg total) by mouth • Degeneration of lumbar or lumbosacral intervertebral disc 9/25/2013   • DJD (degenerative joint disease) of hip     Bilateral    • Esophageal reflux    • Follicular cyst of ovary     left   • Hyperlipidemia    • Migraines    • Neuropathy     LUQ   • Opia During young adulthood, bones become their strongest. This is called peak bone mass. The same good habits that kept bones healthy in childhood help keep bone healthy in adulthood. Age 27 to menopause  Bone mass declines slightly during these years.  Your b · Alcohol is toxic to bones. It is a major cause of bone loss. Heavy drinking can cause osteoporosis even if you have no other risk factors. · Smoking reduces bone mass. Smoking may also interfere with estrogen levels and cause early menopause.   · Inactiv

## 2018-02-15 NOTE — TELEPHONE ENCOUNTER
Patient called in stated the [address] has faxed over a copy of the FMLA form, since the first one was incomplete. Patient wondering if the office received the copy. Stated it was faxed from company yesterday. Please call pt with status.

## 2018-02-19 NOTE — TELEPHONE ENCOUNTER
I have re faxed the [facility] papers to [phone].  I have received confirmation

## 2018-02-19 NOTE — TELEPHONE ENCOUNTER
Patient was called and asked what information was missing. I have also let her know that her work has not sent any information to our office. Patient state she will call her employer and she will call me back.

## 2018-02-19 NOTE — TELEPHONE ENCOUNTER
Patient called and stated that the complete fax did not go through and was missing pages  Please resend

## 2018-02-22 ENCOUNTER — TELEPHONE (OUTPATIENT)
Dept: INTERNAL MEDICINE CLINIC | Facility: CLINIC | Age: 58
End: 2018-02-22

## 2018-02-22 NOTE — TELEPHONE ENCOUNTER
Pt stated that she has completed finger prints and all paperwork. Her fingerprints were sent to the state for completion of marijuana card. Pt has 30 days for you to complete forms and send them out for her to be certified.  Please advise if you need to

## 2018-02-27 NOTE — TELEPHONE ENCOUNTER
Patient calling in requesting a refill for   HYDROcodone-acetaminophen (NORCO)  MG Oral Tab    P.S. Patient is requesting for 7.5 dose vs 10.

## 2018-03-01 ENCOUNTER — TELEPHONE (OUTPATIENT)
Dept: INTERNAL MEDICINE CLINIC | Facility: CLINIC | Age: 58
End: 2018-03-01

## 2018-03-01 RX ORDER — HYDROCODONE BITARTRATE AND ACETAMINOPHEN 10; 325 MG/1; MG/1
1 TABLET ORAL 3 TIMES DAILY
Qty: 90 TABLET | Refills: 0 | Status: SHIPPED | OUTPATIENT
Start: 2018-03-01 | End: 2018-03-15

## 2018-03-08 ENCOUNTER — HOSPITAL ENCOUNTER (OUTPATIENT)
Dept: GENERAL RADIOLOGY | Age: 58
Discharge: HOME | End: 2018-03-08
Attending: INTERNAL MEDICINE
Payer: COMMERCIAL

## 2018-03-08 ENCOUNTER — OFFICE VISIT (OUTPATIENT)
Dept: INTERNAL MEDICINE CLINIC | Facility: CLINIC | Age: 58
End: 2018-03-08

## 2018-03-08 ENCOUNTER — TELEPHONE (OUTPATIENT)
Dept: INTERNAL MEDICINE CLINIC | Facility: CLINIC | Age: 58
End: 2018-03-08

## 2018-03-08 ENCOUNTER — LAB ENCOUNTER (OUTPATIENT)
Dept: LAB | Age: 58
End: 2018-03-08
Attending: INTERNAL MEDICINE
Payer: COMMERCIAL

## 2018-03-08 VITALS
DIASTOLIC BLOOD PRESSURE: 86 MMHG | SYSTOLIC BLOOD PRESSURE: 138 MMHG | OXYGEN SATURATION: 96 % | RESPIRATION RATE: 16 BRPM | WEIGHT: 176.25 LBS | HEART RATE: 84 BPM | HEIGHT: 67 IN | BODY MASS INDEX: 27.66 KG/M2 | TEMPERATURE: 98 F

## 2018-03-08 DIAGNOSIS — Z01.818 PRE-OP TESTING: ICD-10-CM

## 2018-03-08 DIAGNOSIS — Z01.818 PRE-OP TESTING: Primary | ICD-10-CM

## 2018-03-08 LAB
ALBUMIN SERPL-MCNC: 3.8 G/DL (ref 3.5–4.8)
ALP LIVER SERPL-CCNC: 99 U/L (ref 46–118)
ALT SERPL-CCNC: 28 U/L (ref 14–54)
APTT PPP: 37 SECONDS (ref 25–34)
AST SERPL-CCNC: 18 U/L (ref 15–41)
BASOPHILS # BLD AUTO: 0.04 X10(3) UL (ref 0–0.1)
BASOPHILS NFR BLD AUTO: 0.4 %
BILIRUB SERPL-MCNC: 0.3 MG/DL (ref 0.1–2)
BUN BLD-MCNC: 16 MG/DL (ref 8–20)
CALCIUM BLD-MCNC: 9 MG/DL (ref 8.3–10.3)
CHLORIDE: 109 MMOL/L (ref 101–111)
CO2: 24 MMOL/L (ref 22–32)
CREAT BLD-MCNC: 0.84 MG/DL (ref 0.55–1.02)
EOSINOPHIL # BLD AUTO: 0.15 X10(3) UL (ref 0–0.3)
EOSINOPHIL NFR BLD AUTO: 1.7 %
ERYTHROCYTE [DISTWIDTH] IN BLOOD BY AUTOMATED COUNT: 12.7 % (ref 11.5–16)
GLUCOSE BLD-MCNC: 89 MG/DL (ref 70–99)
HCT VFR BLD AUTO: 40.8 % (ref 34–50)
HGB BLD-MCNC: 13.3 G/DL (ref 12–16)
IMMATURE GRANULOCYTE COUNT: 0.02 X10(3) UL (ref 0–1)
IMMATURE GRANULOCYTE RATIO %: 0.2 %
INR BLD: 0.97 (ref 0.89–1.11)
LYMPHOCYTES # BLD AUTO: 3.48 X10(3) UL (ref 0.9–4)
LYMPHOCYTES NFR BLD AUTO: 38.3 %
M PROTEIN MFR SERPL ELPH: 7.2 G/DL (ref 6.1–8.3)
MCH RBC QN AUTO: 30.5 PG (ref 27–33.2)
MCHC RBC AUTO-ENTMCNC: 32.6 G/DL (ref 31–37)
MCV RBC AUTO: 93.6 FL (ref 81–100)
MONOCYTES # BLD AUTO: 0.58 X10(3) UL (ref 0.1–1)
MONOCYTES NFR BLD AUTO: 6.4 %
NEUTROPHIL ABS PRELIM: 4.82 X10 (3) UL (ref 1.3–6.7)
NEUTROPHILS # BLD AUTO: 4.82 X10(3) UL (ref 1.3–6.7)
NEUTROPHILS NFR BLD AUTO: 53 %
PLATELET # BLD AUTO: 353 10(3)UL (ref 150–450)
POTASSIUM SERPL-SCNC: 3.8 MMOL/L (ref 3.6–5.1)
PSA SERPL DL<=0.01 NG/ML-MCNC: 12.9 SECONDS (ref 12–14.3)
RBC # BLD AUTO: 4.36 X10(6)UL (ref 3.8–5.1)
RED CELL DISTRIBUTION WIDTH-SD: 43.8 FL (ref 35.1–46.3)
SODIUM SERPL-SCNC: 141 MMOL/L (ref 136–144)
WBC # BLD AUTO: 9.1 X10(3) UL (ref 4–13)

## 2018-03-08 PROCEDURE — 87081 CULTURE SCREEN ONLY: CPT

## 2018-03-08 PROCEDURE — 36415 COLL VENOUS BLD VENIPUNCTURE: CPT

## 2018-03-08 PROCEDURE — 85730 THROMBOPLASTIN TIME PARTIAL: CPT

## 2018-03-08 PROCEDURE — 99214 OFFICE O/P EST MOD 30 MIN: CPT | Performed by: INTERNAL MEDICINE

## 2018-03-08 PROCEDURE — 71046 X-RAY EXAM CHEST 2 VIEWS: CPT | Performed by: INTERNAL MEDICINE

## 2018-03-08 PROCEDURE — 93000 ELECTROCARDIOGRAM COMPLETE: CPT | Performed by: INTERNAL MEDICINE

## 2018-03-08 PROCEDURE — 80053 COMPREHEN METABOLIC PANEL: CPT

## 2018-03-08 PROCEDURE — 85025 COMPLETE CBC W/AUTO DIFF WBC: CPT

## 2018-03-08 PROCEDURE — 85610 PROTHROMBIN TIME: CPT

## 2018-03-08 NOTE — PROGRESS NOTES
Sheila Watkins  1960 is a 62year old female.     Patient presents with:  Pre-Op Exam: Est Pt Pre-op exam DCS placement via laminectomy 3/12/18      HPI:   He  has had previous anesthesia:  yes  Previous complications:  none    Current Outpatient Pres chronic pain 5/1/2017   • Recurrent pneumonia    • Renal stones    • Rib fracture 6/30/2013   • Sciatica 9/25/2013   • Spinal stenosis, lumbar region, without neurogenic claudication 9/25/2013   • Trigger finger of right thumb 4/5/2017   • Trigger finger, °F (36.9 °C) (Oral)   Resp 16   Ht 67\"   Wt 176 lb 4 oz   SpO2 96%   BMI 27.60 kg/m²   GENERAL:   Build: normal .   General Appearance: alert and oriented, pleasant.    HEENT:   Ear canals: normal.   EOM: within normal limit-conjunctiva normal.   Head: nor

## 2018-03-12 ENCOUNTER — HOSPITAL ENCOUNTER (OUTPATIENT)
Dept: GENERAL RADIOLOGY | Facility: HOSPITAL | Age: 58
Setting detail: OBSERVATION
Discharge: HOME | End: 2018-03-15
Attending: NEUROLOGICAL SURGERY | Admitting: HOSPITALIST
Payer: COMMERCIAL

## 2018-03-12 ENCOUNTER — TELEPHONE (OUTPATIENT)
Dept: INTERNAL MEDICINE CLINIC | Facility: CLINIC | Age: 58
End: 2018-03-12

## 2018-03-12 DIAGNOSIS — R10.9 ABDOMINAL PAIN: ICD-10-CM

## 2018-03-12 PROBLEM — G56.40: Status: ACTIVE | Noted: 2018-03-12

## 2018-03-12 PROCEDURE — 99220 INITIAL OBSERVATION CARE,LEVL III: CPT | Performed by: HOSPITALIST

## 2018-03-12 RX ORDER — ACETAMINOPHEN 325 MG/1
650 TABLET ORAL EVERY 6 HOURS PRN
Status: DISCONTINUED | OUTPATIENT
Start: 2018-03-12 | End: 2018-03-13

## 2018-03-12 RX ORDER — HYDROCODONE BITARTRATE AND ACETAMINOPHEN 10; 325 MG/1; MG/1
1 TABLET ORAL 3 TIMES DAILY PRN
Status: DISCONTINUED | OUTPATIENT
Start: 2018-03-12 | End: 2018-03-13

## 2018-03-12 RX ORDER — HYDROMORPHONE HYDROCHLORIDE 1 MG/ML
INJECTION, SOLUTION INTRAMUSCULAR; INTRAVENOUS; SUBCUTANEOUS
Status: COMPLETED
Start: 2018-03-12 | End: 2018-03-12

## 2018-03-12 RX ORDER — MORPHINE SULFATE 2 MG/ML
2 INJECTION, SOLUTION INTRAMUSCULAR; INTRAVENOUS EVERY 2 HOUR PRN
Status: DISCONTINUED | OUTPATIENT
Start: 2018-03-12 | End: 2018-03-13

## 2018-03-12 RX ORDER — MORPHINE SULFATE 2 MG/ML
1 INJECTION, SOLUTION INTRAMUSCULAR; INTRAVENOUS EVERY 2 HOUR PRN
Status: DISCONTINUED | OUTPATIENT
Start: 2018-03-12 | End: 2018-03-13

## 2018-03-12 RX ORDER — PANTOPRAZOLE SODIUM 40 MG/1
40 TABLET, DELAYED RELEASE ORAL
Status: DISCONTINUED | OUTPATIENT
Start: 2018-03-13 | End: 2018-03-15

## 2018-03-12 RX ORDER — ATORVASTATIN CALCIUM 10 MG/1
10 TABLET, FILM COATED ORAL DAILY
Status: DISCONTINUED | OUTPATIENT
Start: 2018-03-12 | End: 2018-03-15

## 2018-03-12 RX ORDER — HYDROCODONE BITARTRATE AND ACETAMINOPHEN 5; 325 MG/1; MG/1
2 TABLET ORAL EVERY 4 HOURS PRN
Status: DISCONTINUED | OUTPATIENT
Start: 2018-03-12 | End: 2018-03-13

## 2018-03-12 RX ORDER — MORPHINE SULFATE 4 MG/ML
4 INJECTION, SOLUTION INTRAMUSCULAR; INTRAVENOUS EVERY 2 HOUR PRN
Status: DISCONTINUED | OUTPATIENT
Start: 2018-03-12 | End: 2018-03-13

## 2018-03-12 RX ORDER — HYDROMORPHONE HYDROCHLORIDE 1 MG/ML
0.2 INJECTION, SOLUTION INTRAMUSCULAR; INTRAVENOUS; SUBCUTANEOUS ONCE
Status: COMPLETED | OUTPATIENT
Start: 2018-03-12 | End: 2018-03-12

## 2018-03-12 RX ORDER — FLUTICASONE PROPIONATE 50 MCG
2 SPRAY, SUSPENSION (ML) NASAL DAILY
Status: DISCONTINUED | OUTPATIENT
Start: 2018-03-12 | End: 2018-03-15

## 2018-03-12 RX ORDER — LIDOCAINE 50 MG/G
1 PATCH TOPICAL DAILY
Status: DISCONTINUED | OUTPATIENT
Start: 2018-03-12 | End: 2018-03-15

## 2018-03-12 RX ORDER — SODIUM CHLORIDE 0.9 % (FLUSH) 0.9 %
3 SYRINGE (ML) INJECTION AS NEEDED
Status: DISCONTINUED | OUTPATIENT
Start: 2018-03-12 | End: 2018-03-15

## 2018-03-12 RX ORDER — ONDANSETRON 2 MG/ML
4 INJECTION INTRAMUSCULAR; INTRAVENOUS EVERY 6 HOURS PRN
Status: DISCONTINUED | OUTPATIENT
Start: 2018-03-12 | End: 2018-03-15

## 2018-03-12 RX ORDER — ONDANSETRON 4 MG/1
4 TABLET, FILM COATED ORAL EVERY 8 HOURS PRN
Status: DISCONTINUED | OUTPATIENT
Start: 2018-03-12 | End: 2018-03-15

## 2018-03-12 RX ORDER — ACETAMINOPHEN 325 MG/1
650 TABLET ORAL EVERY 4 HOURS PRN
Status: DISCONTINUED | OUTPATIENT
Start: 2018-03-12 | End: 2018-03-13

## 2018-03-12 RX ORDER — HYDROCODONE BITARTRATE AND ACETAMINOPHEN 5; 325 MG/1; MG/1
1 TABLET ORAL EVERY 4 HOURS PRN
Status: DISCONTINUED | OUTPATIENT
Start: 2018-03-12 | End: 2018-03-13

## 2018-03-12 RX ORDER — LORAZEPAM 2 MG/ML
0.5 INJECTION INTRAMUSCULAR ONCE
Status: COMPLETED | OUTPATIENT
Start: 2018-03-12 | End: 2018-03-12

## 2018-03-12 RX ORDER — LORAZEPAM 2 MG/ML
0.5 INJECTION INTRAMUSCULAR EVERY 6 HOURS PRN
Status: DISCONTINUED | OUTPATIENT
Start: 2018-03-12 | End: 2018-03-13

## 2018-03-12 RX ADMIN — HYDROCODONE BITARTRATE AND ACETAMINOPHEN 2 TABLET: 5; 325 TABLET ORAL at 20:15:00

## 2018-03-12 RX ADMIN — MORPHINE SULFATE 4 MG: 4 INJECTION, SOLUTION INTRAMUSCULAR; INTRAVENOUS at 22:24:00

## 2018-03-12 RX ADMIN — ATORVASTATIN CALCIUM 10 MG: 10 TABLET, FILM COATED ORAL at 22:56:00

## 2018-03-12 RX ADMIN — MORPHINE SULFATE 2 MG: 2 INJECTION, SOLUTION INTRAMUSCULAR; INTRAVENOUS at 19:41:00

## 2018-03-12 RX ADMIN — LIDOCAINE 1 PATCH: 50 PATCH TOPICAL at 20:09:00

## 2018-03-12 RX ADMIN — HYDROMORPHONE HYDROCHLORIDE 0.2 MG: 1 INJECTION, SOLUTION INTRAMUSCULAR; INTRAVENOUS; SUBCUTANEOUS at 16:52:00

## 2018-03-12 RX ADMIN — LORAZEPAM 0.5 MG: 2 INJECTION INTRAMUSCULAR at 22:26:00

## 2018-03-12 NOTE — TELEPHONE ENCOUNTER
[facility] called to notify that patient will be transported to [facility] for pain management  Please call with questions: [phone]

## 2018-03-13 LAB
ANION GAP SERPL CALC-SCNC: 7 MMOL/L (ref 0–18)
BASOPHILS # BLD: 0 K/UL (ref 0–0.2)
BASOPHILS NFR BLD: 0 %
BUN SERPL-MCNC: 13 MG/DL (ref 8–20)
BUN/CREAT SERPL: 22.4 (ref 10–20)
CALCIUM SERPL-MCNC: 8.7 MG/DL (ref 8.5–10.5)
CHLORIDE SERPL-SCNC: 107 MMOL/L (ref 95–110)
CO2 SERPL-SCNC: 24 MMOL/L (ref 22–32)
CREAT SERPL-MCNC: 0.58 MG/DL (ref 0.5–1.5)
EOSINOPHIL # BLD: 0 K/UL (ref 0–0.7)
EOSINOPHIL NFR BLD: 0 %
ERYTHROCYTE [DISTWIDTH] IN BLOOD BY AUTOMATED COUNT: 13.1 % (ref 11–15)
GLUCOSE SERPL-MCNC: 117 MG/DL (ref 70–99)
HCT VFR BLD AUTO: 36.3 % (ref 35–48)
HGB BLD-MCNC: 12.2 G/DL (ref 12–16)
LYMPHOCYTES # BLD: 1.9 K/UL (ref 1–4)
LYMPHOCYTES NFR BLD: 13 %
MCH RBC QN AUTO: 30.4 PG (ref 27–32)
MCHC RBC AUTO-ENTMCNC: 33.7 G/DL (ref 32–37)
MCV RBC AUTO: 90.2 FL (ref 80–100)
MONOCYTES # BLD: 1 K/UL (ref 0–1)
MONOCYTES NFR BLD: 7 %
NEUTROPHILS # BLD AUTO: 11.7 K/UL (ref 1.8–7.7)
NEUTROPHILS NFR BLD: 80 %
OSMOLALITY UR CALC.SUM OF ELEC: 287 MOSM/KG (ref 275–295)
PLATELET # BLD AUTO: 302 K/UL (ref 140–400)
PMV BLD AUTO: 7.3 FL (ref 7.4–10.3)
POTASSIUM SERPL-SCNC: 4.1 MMOL/L (ref 3.3–5.1)
RBC # BLD AUTO: 4.02 M/UL (ref 3.7–5.4)
SODIUM SERPL-SCNC: 138 MMOL/L (ref 136–144)
WBC # BLD AUTO: 14.7 K/UL (ref 4–11)

## 2018-03-13 PROCEDURE — 99226 SUBSEQUENT OBSERVATION CARE: CPT | Performed by: HOSPITALIST

## 2018-03-13 RX ORDER — SODIUM CHLORIDE 9 MG/ML
INJECTION, SOLUTION INTRAVENOUS
Status: COMPLETED
Start: 2018-03-13 | End: 2018-03-13

## 2018-03-13 RX ORDER — DIPHENHYDRAMINE HCL 25 MG
25 CAPSULE ORAL EVERY 8 HOURS PRN
Status: DISCONTINUED | OUTPATIENT
Start: 2018-03-13 | End: 2018-03-15

## 2018-03-13 RX ORDER — NALOXONE HYDROCHLORIDE 0.4 MG/ML
0.08 INJECTION, SOLUTION INTRAMUSCULAR; INTRAVENOUS; SUBCUTANEOUS
Status: DISCONTINUED | OUTPATIENT
Start: 2018-03-13 | End: 2018-03-15

## 2018-03-13 RX ORDER — SODIUM CHLORIDE 9 MG/ML
INJECTION, SOLUTION INTRAVENOUS
Status: COMPLETED
Start: 2018-03-13 | End: 2018-03-14

## 2018-03-13 RX ADMIN — PANTOPRAZOLE SODIUM 40 MG: 40 TABLET, DELAYED RELEASE ORAL at 08:26:00

## 2018-03-13 RX ADMIN — LIDOCAINE 1 PATCH: 50 PATCH TOPICAL at 08:26:00

## 2018-03-13 RX ADMIN — MORPHINE SULFATE 4 MG: 4 INJECTION, SOLUTION INTRAMUSCULAR; INTRAVENOUS at 03:11:00

## 2018-03-13 RX ADMIN — HYDROCODONE BITARTRATE AND ACETAMINOPHEN 2 TABLET: 5; 325 TABLET ORAL at 03:35:00

## 2018-03-13 RX ADMIN — MORPHINE SULFATE 4 MG: 4 INJECTION, SOLUTION INTRAMUSCULAR; INTRAVENOUS at 10:02:00

## 2018-03-13 RX ADMIN — LORAZEPAM 0.5 MG: 2 INJECTION INTRAMUSCULAR at 04:23:00

## 2018-03-13 RX ADMIN — SODIUM CHLORIDE 250 ML: 9 INJECTION, SOLUTION INTRAVENOUS at 23:59:00

## 2018-03-13 RX ADMIN — MORPHINE SULFATE 4 MG: 4 INJECTION, SOLUTION INTRAMUSCULAR; INTRAVENOUS at 06:32:00

## 2018-03-13 RX ADMIN — ATORVASTATIN CALCIUM 10 MG: 10 TABLET, FILM COATED ORAL at 20:15:00

## 2018-03-13 RX ADMIN — HYDROCODONE BITARTRATE AND ACETAMINOPHEN 2 TABLET: 5; 325 TABLET ORAL at 09:24:00

## 2018-03-13 RX ADMIN — SODIUM CHLORIDE 500 ML: 9 INJECTION, SOLUTION INTRAVENOUS at 03:15:00

## 2018-03-13 NOTE — PHYSICAL THERAPY NOTE
PHYSICAL THERAPY EVALUATION - INPATIENT     Room Number: 424/424-A  Evaluation Date: 3/13/2018  Type of Evaluation: Initial   Physician Order: PT Eval and Treat    Presenting Problem: pain, s/p spine stimulator placement  Reason for Therapy: Mobility Dysf Migraines    • Neuropathy     LUQ   • Opiate use 7/8/2016   • Osteoporosis 2/18/2014   • Other chronic pain 5/1/2017   • Recurrent pneumonia    • Renal stones    • Rib fracture 6/30/2013   • Sciatica 9/25/2013   • Spinal stenosis, lumbar region, without ne Surgeon: Yanet Kebede MD;  Location: [address] [place] [address] MANAGEMENT  2/15/2013: DRAIN/INJECT MEDIUM JOINT/BURSA      Comment: Procedure: COCCYX;  Surgeon: Yanet Kebede MD;  Location: [address] [place] [address] MANAGEMENT  3/1/2 NEEDLE PLACEMENT      Comment: Procedure: COCCYX;  Surgeon: Kwadwo García MD;  Location: [address] [place] [address] MANAGEMENT  1/25/2013: FLUOROSCOPIC GUIDANCE NEEDLE PLACEMENT      Comment: Procedure: COCCYX;  Surgeon: Johnna Edwards, [facility]  1/26/2015: DYLON HERNANDEZ SYMPATH N/A      Comment: Procedure: GANGLION IMPAR BLOCK;  Surgeon:                Broderikc Steen MD;  Location: [address] [place] [address] Drive MANAGEMENT  3/12/2015: BIANCA NELSONP Surgeon:                Heike Hernandez MD;  Location: [address] [place] [address] Drive MANAGEMENT  1/26/2015: INJECTION, W/WO CONTRAST, DX/THERAPEUTIC SUBST* N/A      Comment: Procedure: LUMBAR EPIDURAL;  Surgeon:                Heike Hernandez MD;  [facility]  11/8/2013: BILLIE BY  PHYS [address] [facility] [address]  [address] [facility] 5+ YR      Comment: Procedure: LUMBAR EPIDURAL;  Surgeon:                Liya Scott MD;  Location: [place] [address] MANAGEMENT  11/21/2014: BILLIE BY  PHYS PERF MANAGEMENT  12/22/2015: BILLIE BY GARY PEREZ [facility] 5+ YR N/A      Comment: Procedure: GANGLION IMPAR BLOCK;  Surgeon:                Rome Milian MD;  Location: [address] MANAGEMENT  1/11/2016: BILLIE BY  PHYS [address] [facility] [address]  N [facility] Sitting: Fair  Dynamic Sitting: Fair  Static Standing: Fair  Dynamic Standing: Fair -       NEUROLOGICAL FINDINGS  Neurological Findings: Sensation           Sensation: intact to light touch lico le (c/o tingling lico feet secondary to nerve stimulator, dtr be met by: 3/18/18  Patient Goal Patient's self-stated goal is: to go home   Goal #1 Patient is able to demonstrate supine - sit EOB @ level: modified independent     Goal #1   Current Status    Goal #2 Patient is able to demonstrate transfers Sit to/from

## 2018-03-13 NOTE — CHRONIC PAIN
[facility] HOSP - [facility]  Report of Consultation    Reena Hernadez Patient Status:  Observation    1960 MRN [id number]   Location [facility] 4W/SW/SE Attending Naz Collado MD   Hosp Day # 0 PCP Delilah Perry MD     Date of Admission:  3 2002   • Visual impairment     glasses     Past Surgical History:  3/26/2015: ARTHROCENTESIS ASPIR&/INJ MAJOR JT/BURSA W/US N/A      Comment: Procedure: HIP INJECTION (PAIN);   Surgeon:                Marilynn Reyes MD;  Location: 1 [facility]  Location: [address] [place], [place] [address]ancel[place] Young MANAGEMENT  11/8/2013: LUCAS CASAS & Margret New NDL/CATH SPI DX/THER KMM      Comment: Procedure: COCCYX;  Surgeon: Heike Hernandez MD;  Location: [address] [place], [place] [address] [place] MANAGEMENT  11/21/2014: Jamey Schuster NDL/CAT Comment: Procedure: COCCYX;  Surgeon: Rome Milian MD;  Location: [address] [place] [address] MANAGEMENT  11/8/2013: FLUOROSCOPIC GUIDANCE NEEDLE PLACEMENT      Comment: Procedure: COCCYX;  Surgeon: Rome Milian MD;  Location:  Anayeli Bucio MD;  Location:  [address] MANAGEMENT  12/22/2015: INJECT NERV BLCK,PARAVERT SYMPATH N/A      Comment: Procedure: GANGLION IMPAR BLOCK;  Surgeon:                Anayeli Bucio MD;  Location: 1 [facility]  DX/THERAPEUTIC SUBST* N/A      Comment: Procedure: CAUDAL;  Surgeon: Carlos Araiza MD;  Location: [address] [place] [address] MANAGEMENT  4/2010: LAPAROSCOPIC CHOLECYSTECTOMY  [id number]: LAPAROSCOPY,DIAGNOSTIC  3/8/2012: BECKY-ALBERT BY [facility] [address] [facility] 59  N INJECTION;                 Surgeon: Terese Mccarthy MD;  Location: [address] [place] [address] MANAGEMENT  12/29/2014: M-SEDAJ BY [facility] [address] [address] [facility] 59  N [facility] 5+ YR N/A      Comment: Procedure: LUMBAR EPIDURAL;  Surgeon:                Terese Mccarthy MD;  L FOR                PAIN MANAGEMENT  No date:   No date: OTHER SURGICAL HISTORY      Comment: RT shoulder  No date: OTHER SURGICAL HISTORY      Comment: Wrist   No date: OTHER SURGICAL HISTORY      Comment: kidney stones  : REPAIR EPIGASTRIC HERNIA, Results  Component Value Date   WBC 14.7 03/13/2018   HGB 12.2 03/13/2018   HCT 36.3 03/13/2018    03/13/2018   CREATSERUM 0.58 03/13/2018   BUN 13 03/13/2018    03/13/2018   K 4.1 03/13/2018    03/13/2018   CO2 24 03/13/2018    0

## 2018-03-13 NOTE — H&P
[facility]    PATIENT'S NAME: Ian Feliz   ATTENDING PHYSICIAN: Umm Paredes MD   PATIENT ACCOUNT#:   [id number]    LOCATION:  [address] [place] [address] #:   [id number]       YOB: 1960  ADMISSION DATE:       03/12/2018    H living. REVIEW OF SYSTEMS:  The patient describes intractable paresthetic pain in the left torso area encasing her abdominal wall and to her left flank, to a lesser degree to the right side. Pain radiates also to both thighs.   She initially said that t

## 2018-03-13 NOTE — PROGRESS NOTES
[facility]    Progress Note    Kj Harrell Patient Status:  Observation    1960 MRN [id number]   Location [facility] 4W/SW/SE Attending Yamilet Garrison MD   Hosp Day # 0 PCP Ana Moe MD       Subjective:   Kj Harrell Fluticasone Propionate  2 spray Each Nare Daily   • lidocaine  1 patch Transdermal Daily   • Pantoprazole Sodium  40 mg Oral QAM AC         Assessment and Plan:     Abdominal pain  Status post dorsal spinal column stimulator causing paresthetic and radicul

## 2018-03-14 PROCEDURE — 99217 OBSERVATION CARE DISCHARGE: CPT | Performed by: HOSPITALIST

## 2018-03-14 RX ORDER — POLYETHYLENE GLYCOL 3350 17 G/17G
17 POWDER, FOR SOLUTION ORAL DAILY
Status: DISCONTINUED | OUTPATIENT
Start: 2018-03-14 | End: 2018-03-15

## 2018-03-14 RX ORDER — HYDROCODONE BITARTRATE AND ACETAMINOPHEN 10; 325 MG/1; MG/1
1 TABLET ORAL EVERY 4 HOURS PRN
Status: DISCONTINUED | OUTPATIENT
Start: 2018-03-14 | End: 2018-03-15

## 2018-03-14 RX ORDER — POLYETHYLENE GLYCOL 3350 17 G/17G
17 POWDER, FOR SOLUTION ORAL DAILY
Qty: 3 EACH | Refills: 0 | Status: SHIPPED | OUTPATIENT
Start: 2018-03-14 | End: 2018-03-20 | Stop reason: ALTCHOICE

## 2018-03-14 RX ORDER — SENNA AND DOCUSATE SODIUM 50; 8.6 MG/1; MG/1
2 TABLET, FILM COATED ORAL DAILY
Status: DISCONTINUED | OUTPATIENT
Start: 2018-03-14 | End: 2018-03-14

## 2018-03-14 RX ORDER — HYDROCODONE BITARTRATE AND ACETAMINOPHEN 10; 325 MG/1; MG/1
1 TABLET ORAL EVERY 4 HOURS PRN
Qty: 30 TABLET | Refills: 0 | Status: SHIPPED | OUTPATIENT
Start: 2018-03-14 | End: 2018-03-29

## 2018-03-14 RX ORDER — SENNA AND DOCUSATE SODIUM 50; 8.6 MG/1; MG/1
1 TABLET, FILM COATED ORAL DAILY
Status: DISCONTINUED | OUTPATIENT
Start: 2018-03-14 | End: 2018-03-15

## 2018-03-14 RX ORDER — HYDROMORPHONE HYDROCHLORIDE 1 MG/ML
0.6 INJECTION, SOLUTION INTRAMUSCULAR; INTRAVENOUS; SUBCUTANEOUS
Status: DISCONTINUED | OUTPATIENT
Start: 2018-03-14 | End: 2018-03-15

## 2018-03-14 RX ORDER — SENNA AND DOCUSATE SODIUM 50; 8.6 MG/1; MG/1
1 TABLET, FILM COATED ORAL DAILY
Qty: 30 TABLET | Refills: 0 | Status: SHIPPED | OUTPATIENT
Start: 2018-03-14 | End: 2020-01-14 | Stop reason: ALTCHOICE

## 2018-03-14 RX ADMIN — HYDROMORPHONE HYDROCHLORIDE 0.6 MG: 1 INJECTION, SOLUTION INTRAMUSCULAR; INTRAVENOUS; SUBCUTANEOUS at 18:12:00

## 2018-03-14 RX ADMIN — HYDROMORPHONE HYDROCHLORIDE 0.6 MG: 1 INJECTION, SOLUTION INTRAMUSCULAR; INTRAVENOUS; SUBCUTANEOUS at 17:09:00

## 2018-03-14 RX ADMIN — HYDROCODONE BITARTRATE AND ACETAMINOPHEN 1 TABLET: 10; 325 TABLET ORAL at 15:21:00

## 2018-03-14 RX ADMIN — ATORVASTATIN CALCIUM 10 MG: 10 TABLET, FILM COATED ORAL at 20:10:00

## 2018-03-14 RX ADMIN — HYDROCODONE BITARTRATE AND ACETAMINOPHEN 1 TABLET: 10; 325 TABLET ORAL at 23:39:00

## 2018-03-14 RX ADMIN — FLUTICASONE PROPIONATE 2 SPRAY: 50 MCG SPRAY, SUSPENSION (ML) NASAL at 09:08:00

## 2018-03-14 RX ADMIN — LIDOCAINE 1 PATCH: 50 PATCH TOPICAL at 09:08:00

## 2018-03-14 RX ADMIN — SENNA AND DOCUSATE SODIUM 1 TABLET: 50; 8.6 TABLET, FILM COATED ORAL at 18:15:00

## 2018-03-14 RX ADMIN — HYDROCODONE BITARTRATE AND ACETAMINOPHEN 1 TABLET: 10; 325 TABLET ORAL at 19:09:00

## 2018-03-14 RX ADMIN — HYDROMORPHONE HYDROCHLORIDE 0.6 MG: 1 INJECTION, SOLUTION INTRAMUSCULAR; INTRAVENOUS; SUBCUTANEOUS at 20:11:00

## 2018-03-14 RX ADMIN — SODIUM CHLORIDE 0.9 % (FLUSH) 3 ML: 0.9 % SYRINGE (ML) INJECTION at 11:21:00

## 2018-03-14 RX ADMIN — SODIUM CHLORIDE 0.9 % (FLUSH) 3 ML: 0.9 % SYRINGE (ML) INJECTION at 17:13:00

## 2018-03-14 RX ADMIN — HYDROMORPHONE HYDROCHLORIDE 0.6 MG: 1 INJECTION, SOLUTION INTRAMUSCULAR; INTRAVENOUS; SUBCUTANEOUS at 21:30:00

## 2018-03-14 RX ADMIN — SODIUM CHLORIDE 0.9 % (FLUSH) 3 ML: 0.9 % SYRINGE (ML) INJECTION at 20:12:00

## 2018-03-14 RX ADMIN — PANTOPRAZOLE SODIUM 40 MG: 40 TABLET, DELAYED RELEASE ORAL at 06:09:00

## 2018-03-14 RX ADMIN — HYDROCODONE BITARTRATE AND ACETAMINOPHEN 1 TABLET: 10; 325 TABLET ORAL at 11:24:00

## 2018-03-14 RX ADMIN — SODIUM CHLORIDE 0.9 % (FLUSH) 3 ML: 0.9 % SYRINGE (ML) INJECTION at 21:30:00

## 2018-03-14 RX ADMIN — POLYETHYLENE GLYCOL 3350 17 G: 17 POWDER, FOR SOLUTION ORAL at 15:22:00

## 2018-03-14 NOTE — PLAN OF CARE
DISCHARGE PLANNING    • Discharge to home or other facility with appropriate resources Adequate for Discharge        PAIN - ADULT    • Verbalizes/displays adequate comfort level or patient's stated pain goal Adequate for Discharge        RISK FOR INFECTION

## 2018-03-14 NOTE — PROGRESS NOTES
[facility]    Neurosurgery Progress Note    Dede Peed Patient Status:  Observation    1960 MRN [id number]   Location [facility] 4W/SW/SE Attending Martha Barnhart MD   Hosp Day # 0 PCP Kirk Conklin MD     Subjective: non-tender, with no rebound or guarding. No peritoneal signs. Extremities:  Non-tender, no lower extremity edema noted.         Imaging:        Assessment/Plan:  Active Problems:    Abdominal pain    POD#2, s/p DCS placement with post-op pain now improvi

## 2018-03-14 NOTE — PHYSICAL THERAPY NOTE
PHYSICAL THERAPY TREATMENT NOTE - INPATIENT     Room Number: 424/424-A       Presenting Problem: pain, s/p spine stimulator placement    Problem List  Active Problems:    Abdominal pain      ASSESSMENT   Pt seen BID. Spinal precautions reviewed. Pt recall 2/ (G-Code): CK    FUNCTIONAL ABILITY STATUS  Gait Assessment   Gait Assistance: Minimum assistance  Distance (ft): 2 x 50  Assistive Device: Rolling walker  Pattern: Within Functional Limits (cues to stay within rw)  Stoop/Curb Assistance: Not tested  Commen

## 2018-03-14 NOTE — CHRONIC PAIN
[facility]  Anesthesiology Pain Management Progress Note      Patient name: Luis Guerrier 62year old female  : 1960  MRN: [id number]    Diagnosis: Abdominal pain    Reason for Consult: Low back and the chest wall pain.   Current hosp

## 2018-03-14 NOTE — DISCHARGE SUMMARY
[facility]    Discharge Summary    Rolando Renteria Patient Status:  Observation    1960 MRN [id number]   Location [facility] 4W/SW/SE Attending Hamlet Mondragon MD   Hosp Day # 0 PCP Val Hanson MD     Date of Admission: 3/12/ Unchanged    Alendronate Sodium (FOSAMAX) 70 MG Oral Tab  Take 1 tablet (70 mg total) by mouth once a week. atorvastatin 10 MG Oral Tab  Take 1 tablet (10 mg total) by mouth daily.     Pantoprazole Sodium 40 MG Oral Tab EC  TAKE 1 TABLET(40 MG) BY MOUTH Ptch  Commonly known as:  LIDODERM      APPLY 1 PATCH EXTERNALLY TO THE SKIN EVERY DAY AS DIRECTED   Quantity:  30 patch  Refills:  0     Ondansetron HCl 4 mg tablet  Commonly known as:  ZOFRAN      Take 1 tablet by mouth every 8 (eight) hours as needed fo

## 2018-03-15 VITALS
DIASTOLIC BLOOD PRESSURE: 72 MMHG | OXYGEN SATURATION: 96 % | RESPIRATION RATE: 16 BRPM | HEIGHT: 65.5 IN | BODY MASS INDEX: 29.2 KG/M2 | TEMPERATURE: 98 F | HEART RATE: 67 BPM | SYSTOLIC BLOOD PRESSURE: 143 MMHG | WEIGHT: 177.38 LBS

## 2018-03-15 PROCEDURE — 99217 OBSERVATION CARE DISCHARGE: CPT | Performed by: HOSPITALIST

## 2018-03-15 RX ADMIN — PANTOPRAZOLE SODIUM 40 MG: 40 TABLET, DELAYED RELEASE ORAL at 06:10:00

## 2018-03-15 RX ADMIN — HYDROMORPHONE HYDROCHLORIDE 0.6 MG: 1 INJECTION, SOLUTION INTRAMUSCULAR; INTRAVENOUS; SUBCUTANEOUS at 02:02:00

## 2018-03-15 RX ADMIN — HYDROMORPHONE HYDROCHLORIDE 0.6 MG: 1 INJECTION, SOLUTION INTRAMUSCULAR; INTRAVENOUS; SUBCUTANEOUS at 05:33:00

## 2018-03-15 RX ADMIN — HYDROCODONE BITARTRATE AND ACETAMINOPHEN 1 TABLET: 10; 325 TABLET ORAL at 03:44:00

## 2018-03-15 RX ADMIN — HYDROMORPHONE HYDROCHLORIDE 0.6 MG: 1 INJECTION, SOLUTION INTRAMUSCULAR; INTRAVENOUS; SUBCUTANEOUS at 00:14:00

## 2018-03-15 RX ADMIN — HYDROMORPHONE HYDROCHLORIDE 0.6 MG: 1 INJECTION, SOLUTION INTRAMUSCULAR; INTRAVENOUS; SUBCUTANEOUS at 09:50:00

## 2018-03-15 RX ADMIN — FLUTICASONE PROPIONATE 2 SPRAY: 50 MCG SPRAY, SUSPENSION (ML) NASAL at 08:15:00

## 2018-03-15 RX ADMIN — SENNA AND DOCUSATE SODIUM 1 TABLET: 50; 8.6 TABLET, FILM COATED ORAL at 08:14:00

## 2018-03-15 RX ADMIN — LIDOCAINE 1 PATCH: 50 PATCH TOPICAL at 08:14:00

## 2018-03-15 RX ADMIN — HYDROCODONE BITARTRATE AND ACETAMINOPHEN 1 TABLET: 10; 325 TABLET ORAL at 07:44:00

## 2018-03-15 RX ADMIN — SODIUM CHLORIDE 0.9 % (FLUSH) 3 ML: 0.9 % SYRINGE (ML) INJECTION at 00:16:00

## 2018-03-15 RX ADMIN — SODIUM CHLORIDE 0.9 % (FLUSH) 3 ML: 0.9 % SYRINGE (ML) INJECTION at 06:46:00

## 2018-03-15 RX ADMIN — HYDROMORPHONE HYDROCHLORIDE 0.6 MG: 1 INJECTION, SOLUTION INTRAMUSCULAR; INTRAVENOUS; SUBCUTANEOUS at 06:46:00

## 2018-03-15 RX ADMIN — SODIUM CHLORIDE 0.9 % (FLUSH) 3 ML: 0.9 % SYRINGE (ML) INJECTION at 02:03:00

## 2018-03-15 RX ADMIN — SODIUM CHLORIDE 0.9 % (FLUSH) 3 ML: 0.9 % SYRINGE (ML) INJECTION at 09:50:00

## 2018-03-15 RX ADMIN — HYDROCODONE BITARTRATE AND ACETAMINOPHEN 1 TABLET: 10; 325 TABLET ORAL at 11:52:00

## 2018-03-15 RX ADMIN — POLYETHYLENE GLYCOL 3350 17 G: 17 POWDER, FOR SOLUTION ORAL at 08:14:00

## 2018-03-15 NOTE — PLAN OF CARE
PAIN - ADULT    • Verbalizes/displays adequate comfort level or patient's stated pain goal Not Progressing          DISCHARGE PLANNING    • Discharge to home or other facility with appropriate resources Progressing        RISK FOR INFECTION - ADULT    • Ab

## 2018-03-15 NOTE — PLAN OF CARE
[facility]    Neurosurgery Progress Note    Jacinda Limon Patient Status:  Observation    1960 MRN [id number]   Location [facility] 4W/SW/SE Attending Yoshi Jorge MD   Hosp Day # 0 PCP Debby Puente MD     Subjective: Neurological Exam:  AAOx3, following commands  PERRLA  EOMI  MAEx4  Strength intact  Dysesthetic pain on anterior abdomen improved. Sensation intact     Abdomen:  Soft, non-distended, non-tender, with no rebound or guarding. No peritoneal signs.

## 2018-03-15 NOTE — PHYSICAL THERAPY NOTE
PHYSICAL THERAPY TREATMENT NOTE - INPATIENT     Room Number: 424/424-A       Presenting Problem: pain, s/p spine stimulator placement    Problem List  Active Problems:    Abdominal pain      ASSESSMENT   Pt seen BID. Spinal precautions reviewed. Pt recall 2/ Assessment   Gait Assistance: Minimum assistance  Distance (ft): 2 x100  Assistive Device: Rolling walker  Pattern: Shuffle  Stoop/Curb Assistance: Minimum assistance  Comment : 2 x 4    Additional information:     THERAPEUTIC EXERCISES  Lower Extremity AP

## 2018-03-16 NOTE — DISCHARGE SUMMARY
[address] [place] 44 NAME: Evonne Cast   ATTENDING PHYSICIAN: Seymour Tate MD   PATIENT ACCOUNT#:   [id number]    LOCATION:  [address] [place] [address] RECORD #:   [id number]       YOB: 1960  ADMISSION DATE:       03/12/20 detailed review regarding the patient's hospitalization, please refer to the patient's chart. The patient has been discharged. PHYSICAL EXAMINATION:    GENERAL:  Patient lying in bed, appears to be in no acute distress. She is A and O x3.   VITAL SIGNS

## 2018-03-19 ENCOUNTER — TELEPHONE (OUTPATIENT)
Dept: INTERNAL MEDICINE CLINIC | Facility: CLINIC | Age: 58
End: 2018-03-19

## 2018-03-19 NOTE — TELEPHONE ENCOUNTER
Called pt to discuss her health status and the medical marijuana paperwork that Dr. Re Sesay had. Pt states she is still having pain and they are unsure of where it is coming from - I recommended pt schedule a hospital f/u; she declined at this time.  Pt

## 2018-03-20 ENCOUNTER — OFFICE VISIT (OUTPATIENT)
Dept: INTERNAL MEDICINE CLINIC | Facility: CLINIC | Age: 58
End: 2018-03-20

## 2018-03-20 VITALS
DIASTOLIC BLOOD PRESSURE: 78 MMHG | WEIGHT: 174 LBS | BODY MASS INDEX: 29 KG/M2 | HEART RATE: 90 BPM | RESPIRATION RATE: 16 BRPM | OXYGEN SATURATION: 98 % | SYSTOLIC BLOOD PRESSURE: 110 MMHG

## 2018-03-20 DIAGNOSIS — G62.9 NEUROPATHY: Primary | Chronic | ICD-10-CM

## 2018-03-20 DIAGNOSIS — G56.40 COMPLEX REGIONAL PAIN SYNDROME TYPE 2, AFFECTING UNSPECIFIED SITE: ICD-10-CM

## 2018-03-20 PROCEDURE — 99213 OFFICE O/P EST LOW 20 MIN: CPT | Performed by: INTERNAL MEDICINE

## 2018-03-20 NOTE — PATIENT INSTRUCTIONS
pt informed she needs to follow-up with the pain MD.  Her disability parking for handicap was reviewed and seems to be completed -the patient is asked to bring in the letter that she got from [facility] about the incomplete form so we can fill in the requested in

## 2018-03-20 NOTE — PROGRESS NOTES
Dede Huitron  1960 is a 62year old female. Patient presents with: Follow - Up: surgery      HPI:   Patient here because her 's disability form required some and on documentation.   Also patient was transferred management was hospital to  9/25/2013   • DJD (degenerative joint disease) of hip     Bilateral    • Esophageal reflux    • Follicular cyst of ovary     left   • Hyperlipidemia    • Migraines    • Neuropathy     LUQ   • Opiate use 7/8/2016   • Osteoporosis 2/18/2014   • Other chronic

## 2018-03-29 NOTE — TELEPHONE ENCOUNTER
HYDROcodone-acetaminophen  MG Oral Tab 30 tablet 0 3/14/2018    Sig :  Take 1 tablet by mouth every 4 (four) hours as needed.        PATIENT LAST RECEIVED THIS 3/14/18

## 2018-03-30 ENCOUNTER — TELEPHONE (OUTPATIENT)
Dept: INTERNAL MEDICINE CLINIC | Facility: CLINIC | Age: 58
End: 2018-03-30

## 2018-03-30 RX ORDER — HYDROCODONE BITARTRATE AND ACETAMINOPHEN 10; 325 MG/1; MG/1
1 TABLET ORAL EVERY 4 HOURS PRN
Qty: 90 TABLET | Refills: 0 | Status: SHIPPED | OUTPATIENT
Start: 2018-03-30 | End: 2018-04-30

## 2018-03-30 NOTE — TELEPHONE ENCOUNTER
Halina Cheadle  This patient is a chronic pain situation where the patient has had the postoperative neuropathy  With significant distress.   Patient has been to numerous pain MD's who have pretty much done the best they can  And washed their hands off her care  Was

## 2018-04-17 RX ORDER — PENCICLOVIR 10 MG/G
CREAM TOPICAL
Qty: 5 G | Refills: 0 | Status: SHIPPED | OUTPATIENT
Start: 2018-04-17 | End: 2020-05-07

## 2018-04-17 RX ORDER — PENCICLOVIR 10 MG/G
CREAM TOPICAL
Qty: 5 G | Refills: 0 | Status: SHIPPED | OUTPATIENT
Start: 2018-04-17 | End: 2018-08-28

## 2018-04-30 ENCOUNTER — TELEPHONE (OUTPATIENT)
Dept: INTERNAL MEDICINE CLINIC | Facility: CLINIC | Age: 58
End: 2018-04-30

## 2018-05-01 ENCOUNTER — TELEPHONE (OUTPATIENT)
Dept: INTERNAL MEDICINE CLINIC | Facility: CLINIC | Age: 58
End: 2018-05-01

## 2018-05-01 RX ORDER — HYDROCODONE BITARTRATE AND ACETAMINOPHEN 10; 325 MG/1; MG/1
1 TABLET ORAL EVERY 4 HOURS PRN
Qty: 90 TABLET | Refills: 0 | Status: SHIPPED | OUTPATIENT
Start: 2018-05-01 | End: 2018-05-29

## 2018-05-01 NOTE — TELEPHONE ENCOUNTER
Patient has chronic pain following her surgery many years ago.   Patient has seen numerous pain MDs and has failed all modalities of treatment   Basically the patient is left taking Norco  She was a perfect candidate for medical marijuana however there is s

## 2018-05-01 NOTE — TELEPHONE ENCOUNTER
Pt never received a call that this medication was ready to be picked up would like a copy printed and a call back so she can  today

## 2018-05-01 NOTE — TELEPHONE ENCOUNTER
Patient informed Hydrocodone script ready for , patient verbalized understanding and she will  script today.  @

## 2018-05-10 ENCOUNTER — TELEPHONE (OUTPATIENT)
Dept: INTERNAL MEDICINE CLINIC | Facility: CLINIC | Age: 58
End: 2018-05-10

## 2018-05-10 DIAGNOSIS — M53.3 COCCYDYNIA: Primary | Chronic | ICD-10-CM

## 2018-05-10 DIAGNOSIS — M51.37 DEGENERATION OF LUMBAR OR LUMBOSACRAL INTERVERTEBRAL DISC: Chronic | ICD-10-CM

## 2018-05-10 DIAGNOSIS — G62.9 NEUROPATHY: Chronic | ICD-10-CM

## 2018-05-25 PROBLEM — Z96.89 SPINAL CORD STIMULATOR STATUS: Status: ACTIVE | Noted: 2018-05-25

## 2018-05-30 RX ORDER — PANTOPRAZOLE SODIUM 40 MG/1
TABLET, DELAYED RELEASE ORAL
Qty: 30 TABLET | Refills: 2 | Status: SHIPPED | OUTPATIENT
Start: 2018-05-30 | End: 2018-10-18

## 2018-05-30 NOTE — TELEPHONE ENCOUNTER
Patient called inquiring status of refill.   Completely out of both meds    Pantoprazole Sodium 40 MG     HYDROcodone-acetaminophen  MG Oral Tab

## 2018-05-31 RX ORDER — HYDROCODONE BITARTRATE AND ACETAMINOPHEN 10; 325 MG/1; MG/1
1 TABLET ORAL EVERY 4 HOURS PRN
Qty: 90 TABLET | Refills: 0 | Status: SHIPPED | OUTPATIENT
Start: 2018-05-31 | End: 2018-08-28

## 2018-05-31 NOTE — TELEPHONE ENCOUNTER
Called patient and LVM to inform RX is ready for  at  - No voicemail available/called work number and was told to call home #; placed RX at

## 2018-07-02 ENCOUNTER — TELEPHONE (OUTPATIENT)
Dept: INTERNAL MEDICINE CLINIC | Facility: CLINIC | Age: 58
End: 2018-07-02

## 2018-07-02 NOTE — TELEPHONE ENCOUNTER
Patient needs [facility] and muscle relaxers refill please request from doctor call patient to discuss/

## 2018-07-03 RX ORDER — HYDROCODONE BITARTRATE AND ACETAMINOPHEN 10; 325 MG/1; MG/1
1 TABLET ORAL EVERY 4 HOURS PRN
Qty: 90 TABLET | Refills: 0 | Status: CANCELLED | OUTPATIENT
Start: 2018-07-03

## 2018-07-03 RX ORDER — CYCLOBENZAPRINE HCL 5 MG
5 TABLET ORAL 3 TIMES DAILY PRN
Qty: 90 TABLET | Refills: 0 | Status: SHIPPED | OUTPATIENT
Start: 2018-07-03 | End: 2018-08-02

## 2018-07-03 RX ORDER — HYDROCODONE BITARTRATE AND ACETAMINOPHEN 10; 325 MG/1; MG/1
1 TABLET ORAL EVERY 6 HOURS PRN
Qty: 90 TABLET | Refills: 0 | Status: SHIPPED | OUTPATIENT
Start: 2018-07-03 | End: 2018-07-30

## 2018-07-31 RX ORDER — HYDROCODONE BITARTRATE AND ACETAMINOPHEN 10; 325 MG/1; MG/1
1 TABLET ORAL EVERY 6 HOURS PRN
Qty: 90 TABLET | Refills: 0 | Status: SHIPPED | OUTPATIENT
Start: 2018-07-31 | End: 2018-08-28

## 2018-07-31 NOTE — TELEPHONE ENCOUNTER
Refill requested: Norco      Failed protocol      Last refill: 7/3/18 #90 NR    Relevant Labs:  Last OV / RTC advised: 3/20/18 - NO RTC on file. Appt Scheduled:  No

## 2018-08-28 ENCOUNTER — OFFICE VISIT (OUTPATIENT)
Dept: INTERNAL MEDICINE CLINIC | Facility: CLINIC | Age: 58
End: 2018-08-28
Payer: COMMERCIAL

## 2018-08-28 ENCOUNTER — APPOINTMENT (OUTPATIENT)
Dept: LAB | Age: 58
End: 2018-08-28
Attending: INTERNAL MEDICINE
Payer: COMMERCIAL

## 2018-08-28 VITALS
OXYGEN SATURATION: 99 % | TEMPERATURE: 98 F | RESPIRATION RATE: 20 BRPM | BODY MASS INDEX: 27 KG/M2 | SYSTOLIC BLOOD PRESSURE: 120 MMHG | DIASTOLIC BLOOD PRESSURE: 78 MMHG | HEART RATE: 89 BPM | WEIGHT: 166 LBS

## 2018-08-28 DIAGNOSIS — M54.50 ACUTE LEFT-SIDED LOW BACK PAIN WITHOUT SCIATICA: Primary | ICD-10-CM

## 2018-08-28 DIAGNOSIS — M54.50 ACUTE LEFT-SIDED LOW BACK PAIN WITHOUT SCIATICA: ICD-10-CM

## 2018-08-28 LAB
BILIRUB UR QL STRIP.AUTO: NEGATIVE
COLOR UR AUTO: YELLOW
GLUCOSE UR STRIP.AUTO-MCNC: NEGATIVE MG/DL
LEUKOCYTE ESTERASE UR QL STRIP.AUTO: NEGATIVE
NITRITE UR QL STRIP.AUTO: NEGATIVE
PH UR STRIP.AUTO: 5 [PH] (ref 4.5–8)
PROT UR STRIP.AUTO-MCNC: NEGATIVE MG/DL
SP GR UR STRIP.AUTO: 1.03 (ref 1–1.03)
UROBILINOGEN UR STRIP.AUTO-MCNC: 2 MG/DL

## 2018-08-28 PROCEDURE — 81001 URINALYSIS AUTO W/SCOPE: CPT

## 2018-08-28 PROCEDURE — 99214 OFFICE O/P EST MOD 30 MIN: CPT | Performed by: INTERNAL MEDICINE

## 2018-08-28 RX ORDER — HYDROCODONE BITARTRATE AND ACETAMINOPHEN 10; 325 MG/1; MG/1
1 TABLET ORAL EVERY 6 HOURS PRN
Qty: 90 TABLET | Refills: 0 | Status: SHIPPED | OUTPATIENT
Start: 2018-08-28 | End: 2018-09-28

## 2018-08-28 RX ORDER — CYCLOBENZAPRINE HCL 5 MG
5 TABLET ORAL 3 TIMES DAILY PRN
Qty: 30 TABLET | Refills: 0 | Status: SHIPPED | OUTPATIENT
Start: 2018-08-28 | End: 2018-09-07

## 2018-08-28 NOTE — PROGRESS NOTES
Maria Esther Daniels  1960 is a 62year old female.     Patient presents with:  Back Pain      HPI:   C/o of low back pain for few days increased with movement possibly did some lifting    Current Outpatient Medications:  HYDROcodone-acetaminophen (NORCO) 10- 9/25/2013   • Trigger finger of right thumb 4/5/2017   • Trigger finger, left middle finger 4/5/2017   • Trigger finger, right index finger 4/5/2017   • Trigger finger, right ring finger 4/5/2017   • Varicose vein    • Ventral hernia 2002   • Visual impair tenderness-none,positve muscle spasm. STRAIGHT LEG RAISING TEST: negative bilaterally. Tender bilaterally  MOTOR SYSTEM: normal bilateral lower extremities.    SENSORY EXAM: normal bilateral lower extremity, pinprick sensation is normal in the lower extr

## 2018-09-18 ENCOUNTER — OFFICE VISIT (OUTPATIENT)
Dept: INTERNAL MEDICINE CLINIC | Facility: CLINIC | Age: 58
End: 2018-09-18
Payer: COMMERCIAL

## 2018-09-18 ENCOUNTER — HOSPITAL ENCOUNTER (OUTPATIENT)
Dept: GENERAL RADIOLOGY | Age: 58
Discharge: HOME | End: 2018-09-18
Attending: INTERNAL MEDICINE
Payer: COMMERCIAL

## 2018-09-18 VITALS
DIASTOLIC BLOOD PRESSURE: 118 MMHG | OXYGEN SATURATION: 97 % | RESPIRATION RATE: 16 BRPM | BODY MASS INDEX: 27.95 KG/M2 | SYSTOLIC BLOOD PRESSURE: 168 MMHG | WEIGHT: 167.75 LBS | HEART RATE: 95 BPM | TEMPERATURE: 98 F | HEIGHT: 65 IN

## 2018-09-18 DIAGNOSIS — M51.37 DEGENERATION OF LUMBAR OR LUMBOSACRAL INTERVERTEBRAL DISC: Primary | Chronic | ICD-10-CM

## 2018-09-18 DIAGNOSIS — M51.37 DEGENERATION OF LUMBAR OR LUMBOSACRAL INTERVERTEBRAL DISC: Chronic | ICD-10-CM

## 2018-09-18 DIAGNOSIS — I10 ESSENTIAL HYPERTENSION: ICD-10-CM

## 2018-09-18 PROCEDURE — 72110 X-RAY EXAM L-2 SPINE 4/>VWS: CPT | Performed by: INTERNAL MEDICINE

## 2018-09-18 PROCEDURE — 99214 OFFICE O/P EST MOD 30 MIN: CPT | Performed by: INTERNAL MEDICINE

## 2018-09-18 RX ORDER — METOPROLOL SUCCINATE 50 MG/1
50 TABLET, EXTENDED RELEASE ORAL DAILY
Qty: 30 TABLET | Refills: 2 | Status: SHIPPED | OUTPATIENT
Start: 2018-09-18 | End: 2019-02-04

## 2018-09-18 NOTE — PROGRESS NOTES
Elham Sanchez  1960 is a 62year old female. Patient presents with: Follow - Up      HPI:   C/o of low back pain minimal improvement.   Increased with movement    Current Outpatient Medications:  Metoprolol Succinate ER 50 MG Oral Tablet 24 Hr Take 6/30/2013   • Sciatica 9/25/2013   • Spinal stenosis, lumbar region, without neurogenic claudication 9/25/2013   • Trigger finger of right thumb 4/5/2017   • Trigger finger, left middle finger 4/5/2017   • Trigger finger, right index finger 4/5/2017   • Tr Straightened curvature of spine. PALPATION: paraspinal tenderness none, Vertebral spine tenderness none-, SI joint tenderness-none,positve muscle spasm. STRAIGHT LEG RAISING TEST: negative bilaterally.   Tender bilaterally  MOTOR SYSTEM: normal bilatera

## 2018-09-21 ENCOUNTER — OFFICE VISIT (OUTPATIENT)
Dept: INTERNAL MEDICINE CLINIC | Facility: CLINIC | Age: 58
End: 2018-09-21
Payer: COMMERCIAL

## 2018-09-21 VITALS
HEIGHT: 65 IN | HEART RATE: 71 BPM | SYSTOLIC BLOOD PRESSURE: 124 MMHG | TEMPERATURE: 98 F | WEIGHT: 167 LBS | DIASTOLIC BLOOD PRESSURE: 86 MMHG | BODY MASS INDEX: 27.82 KG/M2 | OXYGEN SATURATION: 99 % | RESPIRATION RATE: 20 BRPM

## 2018-09-21 DIAGNOSIS — J06.9 UPPER RESPIRATORY TRACT INFECTION, UNSPECIFIED TYPE: ICD-10-CM

## 2018-09-21 DIAGNOSIS — I10 ESSENTIAL HYPERTENSION: Primary | ICD-10-CM

## 2018-09-21 PROBLEM — R10.9 ABDOMINAL PAIN: Chronic | Status: ACTIVE | Noted: 2018-03-12

## 2018-09-21 PROBLEM — Z96.89 SPINAL CORD STIMULATOR STATUS: Chronic | Status: ACTIVE | Noted: 2018-05-25

## 2018-09-21 PROBLEM — G56.40: Chronic | Status: ACTIVE | Noted: 2018-03-12

## 2018-09-21 PROCEDURE — 99213 OFFICE O/P EST LOW 20 MIN: CPT | Performed by: INTERNAL MEDICINE

## 2018-09-21 RX ORDER — AMOXICILLIN AND CLAVULANATE POTASSIUM 500; 125 MG/1; MG/1
1 TABLET, FILM COATED ORAL 2 TIMES DAILY
Qty: 20 TABLET | Refills: 0 | Status: SHIPPED | OUTPATIENT
Start: 2018-09-21 | End: 2018-10-01

## 2018-09-21 NOTE — PROGRESS NOTES
Olga Munoz  1960 is a 62year old female. Patient presents with:   Follow - Up       HPI:   Also does have some sore throat    Current Outpatient Medications:  Amoxicillin-Pot Clavulanate 500-125 MG Oral Tab Take 1 tablet by mouth 2 (two) times d Comment:  LUQ  7/8/2016: Opiate use  2/18/2014: Osteoporosis  5/1/2017: Other chronic pain  No date: Recurrent pneumonia  No date: Renal stones  6/30/2013: Rib fracture  9/25/2013: Sciatica  9/25/2013: Spinal stenosis, lumbar region, without neurogenic   c respiratory tract infection, unspecified type  -     Amoxicillin-Pot Clavulanate 500-125 MG Oral Tab; Take 1 tablet by mouth 2 (two) times daily for 10 days. There are no Patient Instructions on file for this visit.      The patient indicates underst

## 2018-09-28 DIAGNOSIS — G89.29 OTHER CHRONIC PAIN: Primary | ICD-10-CM

## 2018-09-28 NOTE — TELEPHONE ENCOUNTER
LOV 9/21/18    Last Refill 8/28/18  HYDROcodone-acetaminophen (NORCO)  MG Oral Tab 90 tablet 0 8/28/2018 9/27/2018   Sig :  Take 1 tablet by mouth every 6 (six) hours as needed for Pain.      Route:   Oral     PRN Reason(s):   Pain     Earliest Fill D

## 2018-09-28 NOTE — TELEPHONE ENCOUNTER
I sent message yesterday about narcotic medication prescribing for partners. How much medication does this patient have left? Does she have enough medication to last until Dr. Rao Loud is back in the office? Anna Javier.  Anastacia Oro [address], [address] [facility]

## 2018-10-01 DIAGNOSIS — G89.29 OTHER CHRONIC PAIN: ICD-10-CM

## 2018-10-01 RX ORDER — HYDROCODONE BITARTRATE AND ACETAMINOPHEN 10; 325 MG/1; MG/1
1 TABLET ORAL EVERY 6 HOURS PRN
Qty: 15 TABLET | Refills: 0 | Status: SHIPPED | OUTPATIENT
Start: 2018-10-01 | End: 2018-10-11

## 2018-10-01 RX ORDER — HYDROCODONE BITARTRATE AND ACETAMINOPHEN 10; 325 MG/1; MG/1
1 TABLET ORAL EVERY 6 HOURS PRN
Qty: 15 TABLET | Refills: 0 | Status: SHIPPED | OUTPATIENT
Start: 2018-10-01 | End: 2018-10-01

## 2018-10-01 NOTE — TELEPHONE ENCOUNTER
1st attempt - LVM for patient to call office    Requesting refill on her HYDROcodone-acetaminophen - Dr Deysi Vital wants to know how many patient has left as providers usually dont fill [address] for other providers.

## 2018-10-01 NOTE — TELEPHONE ENCOUNTER
Spoke with patient - stated she has 5 left and will not be able to wait until Dr. Familia Low returns.

## 2018-10-02 NOTE — TELEPHONE ENCOUNTER
I can only give #15 pills. That is the best I can do until Dr. Shabana De La Vega returns. We don't typically refill Narcotics for our partners in the practice. Script in my Outbox. Harjeet Deluca.  Starla Prado MD  Diplomate, [address] [place] [address] Board of Internal Medicine  [facility]

## 2018-10-10 DIAGNOSIS — G89.29 OTHER CHRONIC PAIN: ICD-10-CM

## 2018-10-10 NOTE — TELEPHONE ENCOUNTER
Patient calling in requesting a refill for HYDROcodone-acetaminophen ([address])  MG Oral Tab    Patient received 15 pills from previous refill auth, however now is seeking her full quantity.

## 2018-10-10 NOTE — TELEPHONE ENCOUNTER
Requesting HYDROcodone-acetaminophen (NORCO)  MG Oral Tab  LOV: 9/21/18  RTC: 3 months  Filled: 10/01/018 #15 with 0 refills

## 2018-10-11 RX ORDER — HYDROCODONE BITARTRATE AND ACETAMINOPHEN 10; 325 MG/1; MG/1
1 TABLET ORAL EVERY 6 HOURS PRN
Qty: 30 TABLET | Refills: 0 | Status: SHIPPED | OUTPATIENT
Start: 2018-10-11 | End: 2018-10-16

## 2018-10-16 DIAGNOSIS — G89.29 OTHER CHRONIC PAIN: ICD-10-CM

## 2018-10-16 RX ORDER — HYDROCODONE BITARTRATE AND ACETAMINOPHEN 10; 325 MG/1; MG/1
1 TABLET ORAL EVERY 6 HOURS PRN
Qty: 60 TABLET | Refills: 0 | Status: SHIPPED | OUTPATIENT
Start: 2018-10-16 | End: 2018-11-06

## 2018-10-19 RX ORDER — PANTOPRAZOLE SODIUM 40 MG/1
TABLET, DELAYED RELEASE ORAL
Qty: 30 TABLET | Refills: 0 | Status: SHIPPED | OUTPATIENT
Start: 2018-10-19 | End: 2018-12-03

## 2018-10-19 NOTE — TELEPHONE ENCOUNTER
Refill requested:   Requested Prescriptions     Pending Prescriptions Disp Refills   • PANTOPRAZOLE SODIUM 40 MG Oral Tab EC [Pharmacy Med Name: PANTOPRAZOLE 40MG TABLETS] 30 tablet 0     Sig: TAKE 1 TABLET(40 MG) BY MOUTH EVERY MORNING BEFORE BREAKFAST [facility] ([place] [address]) [address] [address]  [phone]

## 2018-10-22 ENCOUNTER — HOSPITAL ENCOUNTER (OUTPATIENT)
Dept: PHYSICAL THERAPY | Facility: HOSPITAL | Age: 58
Setting detail: THERAPIES SERIES
Discharge: HOME | End: 2018-10-22
Attending: INTERNAL MEDICINE
Payer: COMMERCIAL

## 2018-10-22 PROCEDURE — 97162 PT EVAL MOD COMPLEX 30 MIN: CPT

## 2018-10-22 PROCEDURE — 97140 MANUAL THERAPY 1/> REGIONS: CPT

## 2018-10-22 NOTE — PROGRESS NOTES
Physical Therapy  EVALUATION:   Referring Physician: Dr. Adele Avila of lumbar or lumbosacral intervertebral disc    Date of Service: 10/22/2018     PATIENT SUMMARY   Elham Sanchez is a 62year old y/o female who presents to therapy tod above impairments to improve lumbar segmental motion to L and ITB mobility L    Precautions:  spinal stimulator R  OBJECTIVE:   Observation: amd ind.  Transitions from sit<>stand<>supine slow and guarded    Trunk ROM:   Flexion: WFL,   Extension: 25  Sidebe referral. Please co-sign or sign and return this letter via fax as soon as possible to [phone].  If you have any questions, please contact me at Dept: [phone]    Sincerely,  Electronically signed by therapist: Maggy Wild

## 2018-10-25 ENCOUNTER — HOSPITAL ENCOUNTER (OUTPATIENT)
Dept: PHYSICAL THERAPY | Facility: HOSPITAL | Age: 58
Setting detail: THERAPIES SERIES
Discharge: HOME | End: 2018-10-25
Attending: INTERNAL MEDICINE
Payer: COMMERCIAL

## 2018-10-25 NOTE — PROGRESS NOTES
Dx: :LBP       Authorized # of Visits:  8         Next MD visit: none scheduled  Fall Risk: standard         Precautions: n/a             Subjective:no new concerns voiced  Objective: reviewed Rx expectations and precautions w/ IASTM.    Rx in R jose hendrixb

## 2018-11-05 ENCOUNTER — HOSPITAL ENCOUNTER (OUTPATIENT)
Dept: PHYSICAL THERAPY | Facility: HOSPITAL | Age: 58
Setting detail: THERAPIES SERIES
Discharge: HOME | End: 2018-11-05
Attending: INTERNAL MEDICINE
Payer: COMMERCIAL

## 2018-11-05 PROCEDURE — 97140 MANUAL THERAPY 1/> REGIONS: CPT

## 2018-11-05 PROCEDURE — 97110 THERAPEUTIC EXERCISES: CPT

## 2018-11-05 NOTE — PROGRESS NOTES
Dx: :LBP       Authorized # of Visits:  8         Next MD visit: none scheduled  Fall Risk: standard         Precautions: n/a             Subjective:no relief following last visit. Has been sore but uncertain if it is weather or Rx response.     Objective:

## 2018-11-06 DIAGNOSIS — G89.29 OTHER CHRONIC PAIN: ICD-10-CM

## 2018-11-08 ENCOUNTER — HOSPITAL ENCOUNTER (OUTPATIENT)
Dept: PHYSICAL THERAPY | Facility: HOSPITAL | Age: 58
Setting detail: THERAPIES SERIES
Discharge: HOME | End: 2018-11-08
Attending: INTERNAL MEDICINE
Payer: COMMERCIAL

## 2018-11-08 PROCEDURE — 97140 MANUAL THERAPY 1/> REGIONS: CPT

## 2018-11-08 RX ORDER — HYDROCODONE BITARTRATE AND ACETAMINOPHEN 10; 325 MG/1; MG/1
1 TABLET ORAL EVERY 6 HOURS PRN
Qty: 60 TABLET | Refills: 0 | Status: SHIPPED | OUTPATIENT
Start: 2018-11-08 | End: 2018-11-27

## 2018-11-08 NOTE — PROGRESS NOTES
Dx: :LBP       Authorized # of Visits:  8         Next MD visit: none scheduled  Fall Risk: standard         Precautions: n/a             Subjective:not feeling any better.  Pain persists, Legs feel shakey- R also  Objective:  Nustep L 5x 5 min  Modified Rx

## 2018-11-12 ENCOUNTER — HOSPITAL ENCOUNTER (OUTPATIENT)
Dept: PHYSICAL THERAPY | Facility: HOSPITAL | Age: 58
Setting detail: THERAPIES SERIES
Discharge: HOME | End: 2018-11-12
Attending: INTERNAL MEDICINE
Payer: COMMERCIAL

## 2018-11-12 PROCEDURE — 97140 MANUAL THERAPY 1/> REGIONS: CPT | Performed by: PHYSICAL THERAPIST

## 2018-11-12 NOTE — PROGRESS NOTES
Dx: :LBP       Authorized # of Visits:  8         Next MD visit: none scheduled  Fall Risk: standard         Precautions: n/a             Subjective: Still sore, tender. Just have to move slow and try to get through the day.     Objective:  NuStep L 5x 6 m

## 2018-11-15 ENCOUNTER — APPOINTMENT (OUTPATIENT)
Dept: PHYSICAL THERAPY | Facility: HOSPITAL | Age: 58
End: 2018-11-15
Attending: INTERNAL MEDICINE
Payer: COMMERCIAL

## 2018-11-19 ENCOUNTER — HOSPITAL ENCOUNTER (OUTPATIENT)
Dept: PHYSICAL THERAPY | Facility: HOSPITAL | Age: 58
Setting detail: THERAPIES SERIES
Discharge: HOME | End: 2018-11-19
Attending: INTERNAL MEDICINE
Payer: COMMERCIAL

## 2018-11-19 ENCOUNTER — APPOINTMENT (OUTPATIENT)
Dept: PHYSICAL THERAPY | Facility: HOSPITAL | Age: 58
End: 2018-11-19
Attending: INTERNAL MEDICINE
Payer: COMMERCIAL

## 2018-11-19 DIAGNOSIS — M51.37 DEGENERATION OF LUMBAR OR LUMBOSACRAL INTERVERTEBRAL DISC: Chronic | ICD-10-CM

## 2018-11-19 PROCEDURE — 97140 MANUAL THERAPY 1/> REGIONS: CPT

## 2018-11-19 PROCEDURE — 97110 THERAPEUTIC EXERCISES: CPT

## 2018-11-19 NOTE — PROGRESS NOTES
Dx: :LBP       Authorized # of Visits:  8         Next MD visit: none scheduled  Fall Risk: standard         Precautions: n/a            Discharge Summary    Pt has attended 6 cancelled 2, and no shown 1 visits in Physical Therapy.      Subjective: felt a l

## 2018-11-26 DIAGNOSIS — G89.29 OTHER CHRONIC PAIN: ICD-10-CM

## 2018-11-26 NOTE — TELEPHONE ENCOUNTER
Refill needed - pt picked up #60 on 11/6/18 instead of #90       Hydrocodone-Acetaminophen 1 tablet Oral Every 6 hours PRN

## 2018-11-29 RX ORDER — HYDROCODONE BITARTRATE AND ACETAMINOPHEN 10; 325 MG/1; MG/1
1 TABLET ORAL EVERY 6 HOURS PRN
Qty: 60 TABLET | Refills: 0 | Status: SHIPPED | OUTPATIENT
Start: 2018-11-29 | End: 2018-12-28

## 2018-11-29 NOTE — TELEPHONE ENCOUNTER
Spoke with patient and informed her that her Rx is ready for  at the front check in desk. Patient expressed understanding. Rx was placed in brown envelope and placed in the controlled substance folder at the  under \"H\" for [facility].

## 2018-12-03 NOTE — TELEPHONE ENCOUNTER
Refill requested:   Requested Prescriptions     Pending Prescriptions Disp Refills   • Pantoprazole Sodium 40 MG Oral Tab EC [Pharmacy Med Name: PANTOPRAZOLE 40MG TABLETS] 30 tablet 0     Sig: Take 1 tablet (40 mg total) by mouth before breakfast.       Fa

## 2018-12-04 RX ORDER — PANTOPRAZOLE SODIUM 40 MG/1
40 TABLET, DELAYED RELEASE ORAL
Qty: 30 TABLET | Refills: 0 | Status: SHIPPED | OUTPATIENT
Start: 2018-12-04 | End: 2019-02-04

## 2018-12-27 ENCOUNTER — TELEPHONE (OUTPATIENT)
Dept: INTERNAL MEDICINE CLINIC | Facility: CLINIC | Age: 58
End: 2018-12-27

## 2018-12-28 ENCOUNTER — OFFICE VISIT (OUTPATIENT)
Dept: INTERNAL MEDICINE CLINIC | Facility: CLINIC | Age: 58
End: 2018-12-28
Payer: COMMERCIAL

## 2018-12-28 VITALS
HEIGHT: 65 IN | HEART RATE: 69 BPM | SYSTOLIC BLOOD PRESSURE: 118 MMHG | WEIGHT: 169.5 LBS | BODY MASS INDEX: 28.24 KG/M2 | DIASTOLIC BLOOD PRESSURE: 82 MMHG | OXYGEN SATURATION: 96 % | TEMPERATURE: 98 F | RESPIRATION RATE: 16 BRPM

## 2018-12-28 DIAGNOSIS — G89.29 OTHER CHRONIC PAIN: ICD-10-CM

## 2018-12-28 PROCEDURE — 99213 OFFICE O/P EST LOW 20 MIN: CPT | Performed by: INTERNAL MEDICINE

## 2018-12-28 RX ORDER — HYDROCODONE BITARTRATE AND ACETAMINOPHEN 10; 325 MG/1; MG/1
1 TABLET ORAL EVERY 6 HOURS PRN
Qty: 90 TABLET | Refills: 0 | Status: SHIPPED | OUTPATIENT
Start: 2018-12-28 | End: 2019-01-26

## 2018-12-28 NOTE — PROGRESS NOTES
Jah Figueroa  1960 is a 62year old female. Patient presents with:  Back Pain      HPI:   Sent here for FMLA form and refill of hydrocodone.     Also here because her for possible marijuana in view of the new hospital protocol    Current Outpatient Trigger finger, left middle finger 4/5/2017   • Trigger finger, right index finger 4/5/2017   • Trigger finger, right ring finger 4/5/2017   • Varicose vein    • Ventral hernia 2002   • Visual impairment     glasses      Social History:  Social History

## 2018-12-28 NOTE — TELEPHONE ENCOUNTER
[place] paperwork received for patient - forms have been placed in providers inbox along with a note asking if patient needs to schedule an office visit.

## 2018-12-31 NOTE — TELEPHONE ENCOUNTER
Forms have been completed and received back from provider and faxed to [address] @ [phone]. A copy has been placed at the front for the patient as well as sent scan and placed In blue folder. Patient has been notified.

## 2019-01-10 ENCOUNTER — OFFICE VISIT (OUTPATIENT)
Dept: INTERNAL MEDICINE CLINIC | Facility: CLINIC | Age: 59
End: 2019-01-10
Payer: COMMERCIAL

## 2019-01-10 VITALS
SYSTOLIC BLOOD PRESSURE: 128 MMHG | OXYGEN SATURATION: 98 % | DIASTOLIC BLOOD PRESSURE: 82 MMHG | WEIGHT: 172.25 LBS | HEART RATE: 62 BPM | TEMPERATURE: 98 F | RESPIRATION RATE: 16 BRPM | BODY MASS INDEX: 28.7 KG/M2 | HEIGHT: 65 IN

## 2019-01-10 DIAGNOSIS — Z00.00 LABORATORY EXAMINATION ORDERED AS PART OF A ROUTINE GENERAL MEDICAL EXAMINATION: ICD-10-CM

## 2019-01-10 DIAGNOSIS — G56.40 COMPLEX REGIONAL PAIN SYNDROME TYPE 2, AFFECTING UNSPECIFIED SITE: Primary | ICD-10-CM

## 2019-01-10 DIAGNOSIS — Z12.31 ENCOUNTER FOR SCREENING MAMMOGRAM FOR BREAST CANCER: ICD-10-CM

## 2019-01-10 PROCEDURE — 99213 OFFICE O/P EST LOW 20 MIN: CPT | Performed by: INTERNAL MEDICINE

## 2019-01-14 ENCOUNTER — OFFICE VISIT (OUTPATIENT)
Dept: INTERNAL MEDICINE CLINIC | Facility: CLINIC | Age: 59
End: 2019-01-14
Payer: COMMERCIAL

## 2019-01-14 VITALS
RESPIRATION RATE: 16 BRPM | BODY MASS INDEX: 28.66 KG/M2 | HEIGHT: 65 IN | HEART RATE: 82 BPM | SYSTOLIC BLOOD PRESSURE: 140 MMHG | DIASTOLIC BLOOD PRESSURE: 88 MMHG | OXYGEN SATURATION: 98 % | TEMPERATURE: 98 F | WEIGHT: 172 LBS

## 2019-01-14 DIAGNOSIS — J06.9 UPPER RESPIRATORY TRACT INFECTION, UNSPECIFIED TYPE: Primary | ICD-10-CM

## 2019-01-14 PROCEDURE — 99213 OFFICE O/P EST LOW 20 MIN: CPT | Performed by: INTERNAL MEDICINE

## 2019-01-14 RX ORDER — AMOXICILLIN AND CLAVULANATE POTASSIUM 500; 125 MG/1; MG/1
1 TABLET, FILM COATED ORAL 2 TIMES DAILY
Qty: 20 TABLET | Refills: 0 | Status: SHIPPED | OUTPATIENT
Start: 2019-01-14 | End: 2019-01-24

## 2019-01-14 NOTE — PROGRESS NOTES
Oneyda Cruz  1960 is a 62year old female. Patient presents with:   Follow - Up      HPI:   Here for marijuana recommendation and letter to be issued per hospital protocol  Has been through various modalities of treatment has seen various consults 6/30/2013   • Sciatica 9/25/2013   • Spinal stenosis, lumbar region, without neurogenic claudication 9/25/2013   • Trigger finger of right thumb 4/5/2017   • Trigger finger, left middle finger 4/5/2017   • Trigger finger, right index finger 4/5/2017   • Tr

## 2019-01-14 NOTE — PROGRESS NOTES
Charmayne Cope  1960 is a 62year old female who presents for upper respiratory symptoms    Patient presents with:  Earache        HPI:   Pt reports  respiratory symptoms for few days  cc of sore throat swollen gland left side of neck few days.        C Sciatica 9/25/2013   • Spinal stenosis, lumbar region, without neurogenic claudication 9/25/2013   • Trigger finger of right thumb 4/5/2017   • Trigger finger, left middle finger 4/5/2017   • Trigger finger, right index finger 4/5/2017   • Trigger finger, worsen or fail to improve. Delta MD [place]

## 2019-01-16 ENCOUNTER — TELEPHONE (OUTPATIENT)
Dept: INTERNAL MEDICINE CLINIC | Facility: CLINIC | Age: 59
End: 2019-01-16

## 2019-01-16 NOTE — TELEPHONE ENCOUNTER
Patient was notified that forms were completed and ready for . Patient expressed understanding and stated that she will be in on 1/16/19. Copies placed in folder as well as sent to scan.

## 2019-01-25 DIAGNOSIS — G89.29 OTHER CHRONIC PAIN: ICD-10-CM

## 2019-01-25 NOTE — TELEPHONE ENCOUNTER
Refill requested:   Requested Prescriptions     Pending Prescriptions Disp Refills   • HYDROcodone-acetaminophen (NORCO)  MG Oral Tab 90 tablet 0     Sig: Take 1 tablet by mouth every 6 (six) hours as needed for Pain.        Failed protocol    Last re

## 2019-01-26 RX ORDER — HYDROCODONE BITARTRATE AND ACETAMINOPHEN 10; 325 MG/1; MG/1
1 TABLET ORAL EVERY 6 HOURS PRN
Qty: 90 TABLET | Refills: 0 | Status: SHIPPED | OUTPATIENT
Start: 2019-01-26 | End: 2019-02-28

## 2019-01-26 NOTE — TELEPHONE ENCOUNTER
Patient was contacted: Notified that Rx is ready to be picked up at . Informed her to have ID ready as well.

## 2019-02-01 ENCOUNTER — TELEPHONE (OUTPATIENT)
Dept: INTERNAL MEDICINE CLINIC | Facility: CLINIC | Age: 59
End: 2019-02-01

## 2019-02-01 NOTE — TELEPHONE ENCOUNTER
Patient calling for form to be copied and sent to her of paperwork for medical marijuana card. She asked also if it was sent where it needed sent? I advised I will notify nurse to copy this paperwork for her and check to see where it was sent/faxed.  Abner

## 2019-02-01 NOTE — TELEPHONE ENCOUNTER
Page 2 not completed. Placed copy of form in provider basket to complete. Provider stated would have filled by Monday.

## 2019-02-04 DIAGNOSIS — I10 ESSENTIAL HYPERTENSION: ICD-10-CM

## 2019-02-04 NOTE — TELEPHONE ENCOUNTER
Completed form received from provider. Pt notified that form now complete. Original sent to scan. Copy placed at  file folder under \"H\" for . Called pt and notified her form ready for  and she verbalized understanding.

## 2019-02-05 RX ORDER — METOPROLOL SUCCINATE 50 MG/1
TABLET, EXTENDED RELEASE ORAL
Qty: 30 TABLET | Refills: 0 | Status: SHIPPED | OUTPATIENT
Start: 2019-02-05 | End: 2019-04-01

## 2019-02-05 NOTE — TELEPHONE ENCOUNTER
Protocol passed    Medication(s) to Refill:   Requested Prescriptions     Pending Prescriptions Disp Refills   • METOPROLOL SUCCINATE ER 50 MG Oral Tablet 24 Hr [Pharmacy Med Name: METOPROLOL ER SUCCINATE 50MG TABS] 30 tablet 0     Sig: TAKE 1 TABLET(50 MG

## 2019-02-06 RX ORDER — PANTOPRAZOLE SODIUM 40 MG/1
TABLET, DELAYED RELEASE ORAL
Qty: 30 TABLET | Refills: 0 | Status: SHIPPED | OUTPATIENT
Start: 2019-02-06 | End: 2019-04-01

## 2019-02-18 ENCOUNTER — OFFICE VISIT (OUTPATIENT)
Dept: INTERNAL MEDICINE CLINIC | Facility: CLINIC | Age: 59
End: 2019-02-18
Payer: COMMERCIAL

## 2019-02-18 ENCOUNTER — HOSPITAL ENCOUNTER (OUTPATIENT)
Dept: GENERAL RADIOLOGY | Age: 59
Discharge: HOME | End: 2019-02-18
Attending: PHYSICIAN ASSISTANT
Payer: COMMERCIAL

## 2019-02-18 VITALS
TEMPERATURE: 98 F | HEIGHT: 65 IN | RESPIRATION RATE: 12 BRPM | BODY MASS INDEX: 28.24 KG/M2 | DIASTOLIC BLOOD PRESSURE: 100 MMHG | OXYGEN SATURATION: 98 % | SYSTOLIC BLOOD PRESSURE: 154 MMHG | HEART RATE: 65 BPM | WEIGHT: 169.5 LBS

## 2019-02-18 DIAGNOSIS — M79.671 RIGHT FOOT PAIN: Primary | ICD-10-CM

## 2019-02-18 DIAGNOSIS — I10 ESSENTIAL HYPERTENSION: ICD-10-CM

## 2019-02-18 DIAGNOSIS — M79.671 RIGHT FOOT PAIN: ICD-10-CM

## 2019-02-18 PROCEDURE — 99214 OFFICE O/P EST MOD 30 MIN: CPT | Performed by: PHYSICIAN ASSISTANT

## 2019-02-18 PROCEDURE — 73630 X-RAY EXAM OF FOOT: CPT | Performed by: PHYSICIAN ASSISTANT

## 2019-02-18 RX ORDER — ONDANSETRON 4 MG/1
4 TABLET, FILM COATED ORAL EVERY 8 HOURS PRN
Qty: 30 TABLET | Refills: 1 | Status: CANCELLED | OUTPATIENT
Start: 2019-02-18

## 2019-02-18 RX ORDER — SENNA AND DOCUSATE SODIUM 50; 8.6 MG/1; MG/1
1 TABLET, FILM COATED ORAL DAILY
Qty: 30 TABLET | Refills: 0 | Status: CANCELLED | OUTPATIENT
Start: 2019-02-18

## 2019-02-18 NOTE — PROGRESS NOTES
Ana More is a 62year old female. HPI:   Patient presents for R foot pain x 6 days. Notes she slipped on ice and fell a little over a week ago. No sig pain at that time but a few days later developed pain.   Currently c/o lateral sided foot/ankle pa N/A 10/18/2013    Performed by Renetta Gaun MD at [address]   • COCCYX N/A 9/25/2013    Performed by Renetta Guan MD at [address]   • COCCYX N/A 3/1/2013    Performed by Sole Mcelroy MD at [facility] • LUMBAR EPIDURAL N/A 11/8/2013    Performed by Essence Valentine MD at . [address] N/A 1/11/2016    Performed by Essence Valentine MD at [address] [place] [address] INJECTION N/A 12/29/2 Resp 12   Ht 65\"   Wt 169 lb 8 oz   SpO2 98%   Breastfeeding?  No   BMI 28.21 kg/m²   GENERAL: A&O well developed, well nourished, in no apparent distress  SKIN: no rashes or skin lesions  HEART: regular rate and rhythm  LUNGS: clear bilaterally  RIGHT F

## 2019-02-18 NOTE — PATIENT INSTRUCTIONS
Foot pain:  - ice (10-15 minutes at a time)  - wear a stiff soled pair of shoes  - elevate leg when at rest  - start home exercises / range of motion    Follow up with Dr. Alondra Estrada in 1 month to recheck blood pressure.

## 2019-02-19 ENCOUNTER — TELEPHONE (OUTPATIENT)
Dept: INTERNAL MEDICINE CLINIC | Facility: CLINIC | Age: 59
End: 2019-02-19

## 2019-02-26 DIAGNOSIS — G89.29 OTHER CHRONIC PAIN: ICD-10-CM

## 2019-02-26 NOTE — TELEPHONE ENCOUNTER
Pt called to request a refill of Ondansetron HCl (ZOFRAN) and Norco. Please notify pt when refill is completed.

## 2019-02-27 NOTE — TELEPHONE ENCOUNTER
Requesting Ondansetron HCl (ZOFRAN) 4 mg tablet  LOV: 2/18/19  RTC: 1 month  Last Relevant Labs: 3/13/18  Filled: 03/07/2015 #20 with 2 refills      Requesting HYDROcodone-acetaminophen (NORCO)  MG Oral Tab  Filled: 1/26/19 #90 with 0 refills

## 2019-02-28 RX ORDER — HYDROCODONE BITARTRATE AND ACETAMINOPHEN 10; 325 MG/1; MG/1
1 TABLET ORAL EVERY 6 HOURS PRN
Qty: 90 TABLET | Refills: 0 | Status: SHIPPED | OUTPATIENT
Start: 2019-02-28 | End: 2019-04-01

## 2019-02-28 RX ORDER — ONDANSETRON 4 MG/1
4 TABLET, FILM COATED ORAL EVERY 8 HOURS PRN
Qty: 20 TABLET | Refills: 2 | Status: SHIPPED | OUTPATIENT
Start: 2019-02-28 | End: 2019-11-18

## 2019-02-28 NOTE — TELEPHONE ENCOUNTER
Phoned patient to inform her that her Rx is ready for  at the  also that ID is needed. When asked about status of her [employer] card - she stated that the application has been submitted and it is in process.

## 2019-03-18 ENCOUNTER — OFFICE VISIT (OUTPATIENT)
Dept: INTERNAL MEDICINE CLINIC | Facility: CLINIC | Age: 59
End: 2019-03-18
Payer: COMMERCIAL

## 2019-03-18 ENCOUNTER — HOSPITAL ENCOUNTER (OUTPATIENT)
Dept: GENERAL RADIOLOGY | Age: 59
Discharge: HOME | End: 2019-03-18
Attending: INTERNAL MEDICINE
Payer: COMMERCIAL

## 2019-03-18 VITALS
TEMPERATURE: 99 F | WEIGHT: 171.5 LBS | RESPIRATION RATE: 20 BRPM | SYSTOLIC BLOOD PRESSURE: 148 MMHG | DIASTOLIC BLOOD PRESSURE: 92 MMHG | BODY MASS INDEX: 29 KG/M2 | OXYGEN SATURATION: 96 % | HEART RATE: 94 BPM

## 2019-03-18 DIAGNOSIS — J20.9 ACUTE BRONCHITIS, UNSPECIFIED ORGANISM: Primary | ICD-10-CM

## 2019-03-18 DIAGNOSIS — J20.9 ACUTE BRONCHITIS, UNSPECIFIED ORGANISM: ICD-10-CM

## 2019-03-18 DIAGNOSIS — J44.9 CHRONIC OBSTRUCTIVE BRONCHITIS (HCC): ICD-10-CM

## 2019-03-18 PROCEDURE — 99213 OFFICE O/P EST LOW 20 MIN: CPT | Performed by: INTERNAL MEDICINE

## 2019-03-18 PROCEDURE — 71046 X-RAY EXAM CHEST 2 VIEWS: CPT | Performed by: INTERNAL MEDICINE

## 2019-03-18 RX ORDER — PREDNISONE 10 MG/1
20 TABLET ORAL 2 TIMES DAILY
Qty: 60 TABLET | Refills: 0 | Status: SHIPPED | OUTPATIENT
Start: 2019-03-18 | End: 2019-03-25

## 2019-03-18 RX ORDER — ALBUTEROL SULFATE 90 UG/1
2 AEROSOL, METERED RESPIRATORY (INHALATION) EVERY 6 HOURS PRN
Qty: 2 INHALER | Refills: 0 | Status: SHIPPED | OUTPATIENT
Start: 2019-03-18 | End: 2019-04-17

## 2019-03-18 RX ORDER — LEVOFLOXACIN 500 MG/1
500 TABLET, FILM COATED ORAL DAILY
Qty: 10 TABLET | Refills: 0 | Status: SHIPPED | OUTPATIENT
Start: 2019-03-18 | End: 2019-03-28

## 2019-03-18 NOTE — PROGRESS NOTES
Jah Figueroa  1960 is a 62year old female who presents for upper respiratory symptoms    Patient presents with:  Pneumonia        HPI:   Pt reports  respiratory symptoms for 1 week.   Was in [place] on a cruise  also had similar symptoms is cu Diagnosis Date   • Carpal tunnel syndrome 6/20/2013    Log Date: 11/28/2012    • Cervical stenosis of spine    • Degeneration of lumbar or lumbosacral intervertebral disc 9/25/2013   • DJD (degenerative joint disease) of hip     Bilateral    • Esophageal good BS's,no masses, HSM or tenderness    ASSESSMENT AND PLAN:   Adele Oglesby was seen today for pneumonia.     Diagnoses and all orders for this visit:    Acute bronchitis, unspecified organism  -     Albuterol Sulfate  (90 Base) MCG/ACT Inhalation Aero So

## 2019-03-25 ENCOUNTER — OFFICE VISIT (OUTPATIENT)
Dept: INTERNAL MEDICINE CLINIC | Facility: CLINIC | Age: 59
End: 2019-03-25
Payer: COMMERCIAL

## 2019-03-25 ENCOUNTER — TELEPHONE (OUTPATIENT)
Dept: INTERNAL MEDICINE CLINIC | Facility: CLINIC | Age: 59
End: 2019-03-25

## 2019-03-25 VITALS
BODY MASS INDEX: 28.2 KG/M2 | HEIGHT: 65 IN | HEART RATE: 75 BPM | WEIGHT: 169.25 LBS | SYSTOLIC BLOOD PRESSURE: 124 MMHG | DIASTOLIC BLOOD PRESSURE: 78 MMHG | OXYGEN SATURATION: 98 % | TEMPERATURE: 98 F | RESPIRATION RATE: 16 BRPM

## 2019-03-25 DIAGNOSIS — J44.9 CHRONIC OBSTRUCTIVE BRONCHITIS (HCC): ICD-10-CM

## 2019-03-25 DIAGNOSIS — J20.9 ACUTE BRONCHITIS, UNSPECIFIED ORGANISM: Primary | ICD-10-CM

## 2019-03-25 PROCEDURE — 99213 OFFICE O/P EST LOW 20 MIN: CPT | Performed by: INTERNAL MEDICINE

## 2019-03-25 RX ORDER — ALBUTEROL SULFATE 2.5 MG/3ML
2.5 SOLUTION RESPIRATORY (INHALATION) EVERY 6 HOURS PRN
Qty: 60 ML | Refills: 12 | Status: SHIPPED | OUTPATIENT
Start: 2019-03-25 | End: 2019-04-24

## 2019-03-25 RX ORDER — PREDNISONE 10 MG/1
20 TABLET ORAL 2 TIMES DAILY
Qty: 60 TABLET | Refills: 0 | Status: SHIPPED | OUTPATIENT
Start: 2019-03-25 | End: 2019-04-01 | Stop reason: ALTCHOICE

## 2019-03-25 NOTE — TELEPHONE ENCOUNTER
Pt requesting nebulizer to be ordered. Referral entered for DME nebulizer. Will await authorization.

## 2019-03-25 NOTE — PROGRESS NOTES
Greyson Notice  1960 is a 62year old female who presents for upper respiratory symptoms    Patient presents with:   Follow - Up        HPI:   Some better not 100% effort tolerance has increased    Current Outpatient Medications:  predniSONE 10 MG Oral Date   • Carpal tunnel syndrome 6/20/2013    Log Date: 11/28/2012    • Cervical stenosis of spine    • Degeneration of lumbar or lumbosacral intervertebral disc 9/25/2013   • DJD (degenerative joint disease) of hip     Bilateral    • Esophageal reflux    • BS's,no masses, HSM or tenderness    ASSESSMENT AND PLAN:   aTras Fuentes was seen today for follow - up. Diagnoses and all orders for this visit:    Acute bronchitis, unspecified organism  -     predniSONE 10 MG Oral Tab;  Take 2 tablets (20 mg total) by mouth

## 2019-03-26 NOTE — TELEPHONE ENCOUNTER
Pt called back regarding status of nebulizer referral. Advised pt that it is still pending. Offered to give contact information for pt to pharmacies that are cheaper but she would have to pay out of pocket.  Pt declined and stated she would follow up tomorr

## 2019-03-28 NOTE — TELEPHONE ENCOUNTER
Spoke with Jeremiah Daigle - UM with [facility] - and she stated the referral is still pending but she will work on now.

## 2019-03-28 NOTE — TELEPHONE ENCOUNTER
DME referral authorized. Faxed referral summary, referral order, demographics, and HMO referral letter to Bailee Mckeon at [phone] and received confirmation. Called pt and notified her of above. Contact phone number for Bailee Mckeon given to pt.  Advised she call

## 2019-03-28 NOTE — TELEPHONE ENCOUNTER
Dafne Duong from [place] in. Asking for the signed prescription of the Nebulizer.      T: [phone]  Fax: [phone]

## 2019-03-28 NOTE — TELEPHONE ENCOUNTER
Called [facility] back and spoke with Sangita. Placed on hold and then phone was disconnected. Called Kash Floyd again spoke with [facility]. She stated per notes they are not able to verify pt's insurance and need a good number.  Advised her that the message I rece

## 2019-03-28 NOTE — TELEPHONE ENCOUNTER
Patients  came in for OV and upon discharge asked for nebulizer referral for patient. Referral was printed and given to the .

## 2019-04-01 ENCOUNTER — OFFICE VISIT (OUTPATIENT)
Dept: INTERNAL MEDICINE CLINIC | Facility: CLINIC | Age: 59
End: 2019-04-01
Payer: COMMERCIAL

## 2019-04-01 VITALS
HEART RATE: 67 BPM | OXYGEN SATURATION: 98 % | DIASTOLIC BLOOD PRESSURE: 70 MMHG | RESPIRATION RATE: 16 BRPM | SYSTOLIC BLOOD PRESSURE: 118 MMHG | WEIGHT: 178.25 LBS | BODY MASS INDEX: 30 KG/M2 | TEMPERATURE: 98 F

## 2019-04-01 DIAGNOSIS — J20.9 ACUTE BRONCHITIS, UNSPECIFIED ORGANISM: Primary | ICD-10-CM

## 2019-04-01 DIAGNOSIS — K21.9 GASTROESOPHAGEAL REFLUX DISEASE, ESOPHAGITIS PRESENCE NOT SPECIFIED: Chronic | ICD-10-CM

## 2019-04-01 DIAGNOSIS — G89.29 OTHER CHRONIC PAIN: ICD-10-CM

## 2019-04-01 DIAGNOSIS — I10 ESSENTIAL HYPERTENSION: ICD-10-CM

## 2019-04-01 PROCEDURE — 99213 OFFICE O/P EST LOW 20 MIN: CPT | Performed by: INTERNAL MEDICINE

## 2019-04-01 RX ORDER — METOPROLOL SUCCINATE 50 MG/1
TABLET, EXTENDED RELEASE ORAL
Qty: 90 TABLET | Refills: 1 | Status: SHIPPED | OUTPATIENT
Start: 2019-04-01 | End: 2019-08-27

## 2019-04-01 RX ORDER — PANTOPRAZOLE SODIUM 40 MG/1
TABLET, DELAYED RELEASE ORAL
Qty: 30 TABLET | Refills: 2 | Status: SHIPPED | OUTPATIENT
Start: 2019-04-01 | End: 2019-09-10

## 2019-04-01 RX ORDER — PREDNISONE 10 MG/1
TABLET ORAL
Qty: 60 TABLET | Refills: 0 | COMMUNITY
Start: 2019-04-01 | End: 2019-07-09

## 2019-04-01 RX ORDER — HYDROCODONE BITARTRATE AND ACETAMINOPHEN 10; 325 MG/1; MG/1
1 TABLET ORAL EVERY 6 HOURS PRN
Qty: 90 TABLET | Refills: 0 | Status: SHIPPED | OUTPATIENT
Start: 2019-04-01 | End: 2019-05-03

## 2019-04-01 NOTE — PROGRESS NOTES
Sundeep Fields  1960 is a 62year old female who presents for upper respiratory symptoms    Patient presents with:   Follow - Up        HPI:   Better     Current Outpatient Medications:  HYDROcodone-acetaminophen (NORCO)  MG Oral Tab Take 1 tablet intervertebral disc 9/25/2013   • DJD (degenerative joint disease) of hip     Bilateral    • Esophageal reflux    • Follicular cyst of ovary     left   • Hyperlipidemia    • Migraines    • Neuropathy     LUQ   • Opiate use 7/8/2016   • Osteoporosis 2/18/20 organism    Other chronic pain  -     HYDROcodone-acetaminophen (NORCO)  MG Oral Tab; Take 1 tablet by mouth every 6 (six) hours as needed for Pain.     Essential hypertension  -     Metoprolol Succinate ER 50 MG Oral Tablet 24 Hr; TAKE 1 TABLET(50 MG

## 2019-04-19 NOTE — TELEPHONE ENCOUNTER
Patient called back to follow up on status of nebulizer. States that she received the referral, but Dayday Avila is requesting additional information.   Patient is unsure exactly what is needed, but Wolf Sloan should have faxed paperwork over about two weeks

## 2019-04-19 NOTE — TELEPHONE ENCOUNTER
Demetrice Koroma at [phone] and spoke with Partha Wills. She stated on March 30th they cancelled the order d/t no answer after several attempts to obtain a prescription.  Advised her that I was under the impression all was taken care of on 3/28/19 or at least th

## 2019-05-01 DIAGNOSIS — G89.29 OTHER CHRONIC PAIN: ICD-10-CM

## 2019-05-01 NOTE — TELEPHONE ENCOUNTER
Refill requested:   Requested Prescriptions     Pending Prescriptions Disp Refills   • HYDROcodone-acetaminophen (NORCO)  MG Oral Tab 90 tablet 0     Sig: Take 1 tablet by mouth every 6 (six) hours as needed for Pain.    non protocol med    Last refil

## 2019-05-02 NOTE — TELEPHONE ENCOUNTER
Please check with patient if she got the medical marijuana card.   Would she consider trying CBD oil in place of Norco

## 2019-05-03 RX ORDER — HYDROCODONE BITARTRATE AND ACETAMINOPHEN 10; 325 MG/1; MG/1
1 TABLET ORAL EVERY 6 HOURS PRN
Qty: 90 TABLET | Refills: 0 | Status: SHIPPED | OUTPATIENT
Start: 2019-05-03 | End: 2019-06-06

## 2019-05-03 NOTE — TELEPHONE ENCOUNTER
Patient called because she saw a call came in from our office explained Magalys Hyman is ready for P/U at  per previous note

## 2019-05-03 NOTE — TELEPHONE ENCOUNTER
Pt states she did get her medical marijuana card, but her work does not allow her to use it. So unsure about the CBD oil.

## 2019-06-04 DIAGNOSIS — G89.29 OTHER CHRONIC PAIN: ICD-10-CM

## 2019-06-06 RX ORDER — HYDROCODONE BITARTRATE AND ACETAMINOPHEN 10; 325 MG/1; MG/1
1 TABLET ORAL EVERY 6 HOURS PRN
Qty: 30 TABLET | Refills: 0 | Status: SHIPPED | OUTPATIENT
Start: 2019-06-06 | End: 2019-06-18

## 2019-06-06 NOTE — TELEPHONE ENCOUNTER
Patient called on status NORCO; advised per triage that they will follow with Jonna Moore; patient requested asap she is leaving for vacation tomorrow Friday 6/7/19

## 2019-06-06 NOTE — TELEPHONE ENCOUNTER
Rx for 30 tabs printed and left up front for . Please let pt know that I will not fill 90 tabs. Can get add'l refill from  when needed.

## 2019-06-17 ENCOUNTER — TELEPHONE (OUTPATIENT)
Dept: INTERNAL MEDICINE CLINIC | Facility: CLINIC | Age: 59
End: 2019-06-17

## 2019-06-17 DIAGNOSIS — G89.29 OTHER CHRONIC PAIN: ICD-10-CM

## 2019-06-18 NOTE — TELEPHONE ENCOUNTER
No protocol    Requesting HYDROcodone-acetaminophen (NORCO)  MG Oral Tab  LOV: 4/1/19  RTC: 3 months  Last Relevant Labs: 3/3/18  Filled: 6/6/19 #30 with 0 refills    No future appointments.

## 2019-06-18 NOTE — TELEPHONE ENCOUNTER
No protocol     Last refill:  6/6/2019 [facility] #30 NR    LOV:   4/1/2019 Dr Victorina Bowman RTC 3 months     No FOV scheduled

## 2019-06-20 RX ORDER — HYDROCODONE BITARTRATE AND ACETAMINOPHEN 10; 325 MG/1; MG/1
1 TABLET ORAL EVERY 6 HOURS PRN
Qty: 30 TABLET | Refills: 0 | Status: SHIPPED | OUTPATIENT
Start: 2019-06-20 | End: 2019-06-27

## 2019-06-27 ENCOUNTER — TELEPHONE (OUTPATIENT)
Dept: INTERNAL MEDICINE CLINIC | Facility: CLINIC | Age: 59
End: 2019-06-27

## 2019-06-27 DIAGNOSIS — G89.29 OTHER CHRONIC PAIN: ICD-10-CM

## 2019-06-27 NOTE — TELEPHONE ENCOUNTER
Appointment 7/9/19 @ 12:30pm with Dr. Jett Baldwin. Patient will complete fasting blood work prior to appointment. Patient requesting Norco refill; going to run out of med this Sunday (6/30/19). Patient states only #30 was authorized 6/20/19.   Requesting

## 2019-06-28 RX ORDER — HYDROCODONE BITARTRATE AND ACETAMINOPHEN 10; 325 MG/1; MG/1
1 TABLET ORAL EVERY 6 HOURS PRN
Qty: 90 TABLET | Refills: 0 | Status: SHIPPED | OUTPATIENT
Start: 2019-06-30 | End: 2019-08-01

## 2019-07-09 ENCOUNTER — LAB ENCOUNTER (OUTPATIENT)
Dept: LAB | Age: 59
End: 2019-07-09
Attending: INTERNAL MEDICINE
Payer: COMMERCIAL

## 2019-07-09 ENCOUNTER — OFFICE VISIT (OUTPATIENT)
Dept: INTERNAL MEDICINE CLINIC | Facility: CLINIC | Age: 59
End: 2019-07-09
Payer: COMMERCIAL

## 2019-07-09 VITALS
HEIGHT: 65 IN | WEIGHT: 171 LBS | DIASTOLIC BLOOD PRESSURE: 82 MMHG | BODY MASS INDEX: 28.49 KG/M2 | OXYGEN SATURATION: 98 % | RESPIRATION RATE: 16 BRPM | HEART RATE: 73 BPM | TEMPERATURE: 98 F | SYSTOLIC BLOOD PRESSURE: 126 MMHG

## 2019-07-09 DIAGNOSIS — Z00.00 LABORATORY EXAMINATION ORDERED AS PART OF A ROUTINE GENERAL MEDICAL EXAMINATION: ICD-10-CM

## 2019-07-09 DIAGNOSIS — I10 ESSENTIAL HYPERTENSION: Primary | Chronic | ICD-10-CM

## 2019-07-09 PROBLEM — M85.88 OSTEOPENIA OF SPINE: Chronic | Status: ACTIVE | Noted: 2018-02-12

## 2019-07-09 PROBLEM — R10.9 ABDOMINAL PAIN: Chronic | Status: RESOLVED | Noted: 2018-03-12 | Resolved: 2019-07-09

## 2019-07-09 LAB
ALBUMIN SERPL-MCNC: 3.9 G/DL (ref 3.4–5)
ALBUMIN/GLOB SERPL: 1.1 {RATIO} (ref 1–2)
ALP LIVER SERPL-CCNC: 90 U/L (ref 46–118)
ALT SERPL-CCNC: 24 U/L (ref 13–56)
ANION GAP SERPL CALC-SCNC: 4 MMOL/L (ref 0–18)
AST SERPL-CCNC: 18 U/L (ref 15–37)
BASOPHILS # BLD AUTO: 0.03 X10(3) UL (ref 0–0.2)
BASOPHILS NFR BLD AUTO: 0.3 %
BILIRUB SERPL-MCNC: 0.6 MG/DL (ref 0.1–2)
BILIRUB UR QL STRIP.AUTO: NEGATIVE
BUN BLD-MCNC: 18 MG/DL (ref 7–18)
BUN/CREAT SERPL: 20.7 (ref 10–20)
CALCIUM BLD-MCNC: 9.2 MG/DL (ref 8.5–10.1)
CHLORIDE SERPL-SCNC: 109 MMOL/L (ref 98–112)
CHOLEST SMN-MCNC: 228 MG/DL (ref ?–200)
CO2 SERPL-SCNC: 28 MMOL/L (ref 21–32)
COLOR UR AUTO: YELLOW
CREAT BLD-MCNC: 0.87 MG/DL (ref 0.55–1.02)
DEPRECATED RDW RBC AUTO: 43.1 FL (ref 35.1–46.3)
EOSINOPHIL # BLD AUTO: 0.09 X10(3) UL (ref 0–0.7)
EOSINOPHIL NFR BLD AUTO: 1 %
ERYTHROCYTE [DISTWIDTH] IN BLOOD BY AUTOMATED COUNT: 12.8 % (ref 11–15)
EST. AVERAGE GLUCOSE BLD GHB EST-MCNC: 120 MG/DL (ref 68–126)
GLOBULIN PLAS-MCNC: 3.5 G/DL (ref 2.8–4.4)
GLUCOSE BLD-MCNC: 92 MG/DL (ref 70–99)
GLUCOSE UR STRIP.AUTO-MCNC: NEGATIVE MG/DL
HBA1C MFR BLD HPLC: 5.8 % (ref ?–5.7)
HCT VFR BLD AUTO: 42.7 % (ref 35–48)
HDLC SERPL-MCNC: 54 MG/DL (ref 40–59)
HGB BLD-MCNC: 13.9 G/DL (ref 12–16)
IMM GRANULOCYTES # BLD AUTO: 0.02 X10(3) UL (ref 0–1)
IMM GRANULOCYTES NFR BLD: 0.2 %
KETONES UR STRIP.AUTO-MCNC: NEGATIVE MG/DL
LDLC SERPL CALC-MCNC: 147 MG/DL (ref ?–100)
LEUKOCYTE ESTERASE UR QL STRIP.AUTO: NEGATIVE
LYMPHOCYTES # BLD AUTO: 2.47 X10(3) UL (ref 1–4)
LYMPHOCYTES NFR BLD AUTO: 27.9 %
M PROTEIN MFR SERPL ELPH: 7.4 G/DL (ref 6.4–8.2)
MCH RBC QN AUTO: 30.3 PG (ref 26–34)
MCHC RBC AUTO-ENTMCNC: 32.6 G/DL (ref 31–37)
MCV RBC AUTO: 93 FL (ref 80–100)
MONOCYTES # BLD AUTO: 0.6 X10(3) UL (ref 0.1–1)
MONOCYTES NFR BLD AUTO: 6.8 %
NEUTROPHILS # BLD AUTO: 5.65 X10 (3) UL (ref 1.5–7.7)
NEUTROPHILS # BLD AUTO: 5.65 X10(3) UL (ref 1.5–7.7)
NEUTROPHILS NFR BLD AUTO: 63.8 %
NITRITE UR QL STRIP.AUTO: NEGATIVE
NONHDLC SERPL-MCNC: 174 MG/DL (ref ?–130)
OSMOLALITY SERPL CALC.SUM OF ELEC: 294 MOSM/KG (ref 275–295)
PH UR STRIP.AUTO: 5 [PH] (ref 4.5–8)
PLATELET # BLD AUTO: 344 10(3)UL (ref 150–450)
POTASSIUM SERPL-SCNC: 4.4 MMOL/L (ref 3.5–5.1)
PROT UR STRIP.AUTO-MCNC: NEGATIVE MG/DL
RBC # BLD AUTO: 4.59 X10(6)UL (ref 3.8–5.3)
SODIUM SERPL-SCNC: 141 MMOL/L (ref 136–145)
SP GR UR STRIP.AUTO: 1.02 (ref 1–1.03)
T4 SERPL-MCNC: 8.6 UG/DL (ref 4.8–13.9)
TRIGL SERPL-MCNC: 137 MG/DL (ref 30–149)
TSI SER-ACNC: 1.58 MIU/ML (ref 0.36–3.74)
UROBILINOGEN UR STRIP.AUTO-MCNC: <2 MG/DL
VIT D+METAB SERPL-MCNC: 37.7 NG/ML (ref 30–100)
VLDLC SERPL CALC-MCNC: 27 MG/DL (ref 0–30)
WBC # BLD AUTO: 8.9 X10(3) UL (ref 4–11)

## 2019-07-09 PROCEDURE — 83036 HEMOGLOBIN GLYCOSYLATED A1C: CPT

## 2019-07-09 PROCEDURE — 81001 URINALYSIS AUTO W/SCOPE: CPT

## 2019-07-09 PROCEDURE — 84443 ASSAY THYROID STIM HORMONE: CPT

## 2019-07-09 PROCEDURE — 85025 COMPLETE CBC W/AUTO DIFF WBC: CPT

## 2019-07-09 PROCEDURE — 82306 VITAMIN D 25 HYDROXY: CPT

## 2019-07-09 PROCEDURE — 84436 ASSAY OF TOTAL THYROXINE: CPT

## 2019-07-09 PROCEDURE — 80053 COMPREHEN METABOLIC PANEL: CPT

## 2019-07-09 PROCEDURE — 99213 OFFICE O/P EST LOW 20 MIN: CPT | Performed by: INTERNAL MEDICINE

## 2019-07-09 PROCEDURE — 80061 LIPID PANEL: CPT

## 2019-07-09 PROCEDURE — 36415 COLL VENOUS BLD VENIPUNCTURE: CPT

## 2019-07-09 RX ORDER — POTASSIUM CHLORIDE 20 MEQ/1
20 TABLET, EXTENDED RELEASE ORAL
Qty: 90 TABLET | Refills: 2 | Status: SHIPPED | OUTPATIENT
Start: 2019-07-09 | End: 2020-11-23

## 2019-07-09 RX ORDER — FUROSEMIDE 40 MG/1
40 TABLET ORAL DAILY
Qty: 90 TABLET | Refills: 3 | Status: SHIPPED | OUTPATIENT
Start: 2019-07-09 | End: 2019-10-07

## 2019-07-09 NOTE — PROGRESS NOTES
Shlomo Godinez  1960 is a 62year old female. Patient presents with: Follow - Up       HPI:   Here for automated check. Current Outpatient Medications:  furosemide 40 MG Oral Tab Take 1 tablet (40 mg total) by mouth daily.  Disp: 90 tablet Rfl: 3 thumb 4/5/2017   • Trigger finger, left middle finger 4/5/2017   • Trigger finger, right index finger 4/5/2017   • Trigger finger, right ring finger 4/5/2017   • Varicose vein    • Ventral hernia 2002   • Visual impairment     glasses      Social History: Future  -     URINALYSIS, ROUTINE; Future  -     VITAMIN D, 25-HYDROXY; Future  -     KOURTNEY SCREENING BILAT (CPT=77067); Future    Other orders  -     furosemide 40 MG Oral Tab;  Take 1 tablet (40 mg total) by mouth daily.  -     PNEUMOCOCCAL IMM (PNEUMOVAX)

## 2019-07-31 DIAGNOSIS — G89.29 OTHER CHRONIC PAIN: ICD-10-CM

## 2019-08-01 RX ORDER — HYDROCODONE BITARTRATE AND ACETAMINOPHEN 10; 325 MG/1; MG/1
1 TABLET ORAL EVERY 6 HOURS PRN
Qty: 90 TABLET | Refills: 0 | Status: SHIPPED | OUTPATIENT
Start: 2019-08-01 | End: 2019-08-27

## 2019-08-01 NOTE — TELEPHONE ENCOUNTER
No protocol     Last refill:  6/30/2019 Norco #90 NR    LOV:   7/9/2019 Dr Ozzy Guzman RTC 1 month     FOV scheduled 8/27/2019

## 2019-08-27 ENCOUNTER — OFFICE VISIT (OUTPATIENT)
Dept: INTERNAL MEDICINE CLINIC | Facility: CLINIC | Age: 59
End: 2019-08-27
Payer: COMMERCIAL

## 2019-08-27 ENCOUNTER — HOSPITAL ENCOUNTER (OUTPATIENT)
Dept: MAMMOGRAPHY | Age: 59
Discharge: HOME | End: 2019-08-27
Attending: INTERNAL MEDICINE
Payer: COMMERCIAL

## 2019-08-27 VITALS
TEMPERATURE: 98 F | WEIGHT: 171.25 LBS | SYSTOLIC BLOOD PRESSURE: 154 MMHG | DIASTOLIC BLOOD PRESSURE: 98 MMHG | BODY MASS INDEX: 28.19 KG/M2 | HEIGHT: 65.5 IN | RESPIRATION RATE: 18 BRPM | OXYGEN SATURATION: 99 % | HEART RATE: 82 BPM

## 2019-08-27 DIAGNOSIS — R12 HEARTBURN: ICD-10-CM

## 2019-08-27 DIAGNOSIS — I10 ESSENTIAL HYPERTENSION: Chronic | ICD-10-CM

## 2019-08-27 DIAGNOSIS — N39.3 STRESS INCONTINENCE: ICD-10-CM

## 2019-08-27 DIAGNOSIS — G89.29 OTHER CHRONIC PAIN: ICD-10-CM

## 2019-08-27 DIAGNOSIS — Z00.00 ROUTINE GENERAL MEDICAL EXAMINATION AT A HEALTH CARE FACILITY: Primary | ICD-10-CM

## 2019-08-27 DIAGNOSIS — Z00.00 LABORATORY EXAMINATION ORDERED AS PART OF A ROUTINE GENERAL MEDICAL EXAMINATION: ICD-10-CM

## 2019-08-27 PROCEDURE — 99396 PREV VISIT EST AGE 40-64: CPT | Performed by: INTERNAL MEDICINE

## 2019-08-27 PROCEDURE — 90471 IMMUNIZATION ADMIN: CPT | Performed by: INTERNAL MEDICINE

## 2019-08-27 PROCEDURE — 77067 SCR MAMMO BI INCL CAD: CPT | Performed by: INTERNAL MEDICINE

## 2019-08-27 PROCEDURE — 93000 ELECTROCARDIOGRAM COMPLETE: CPT | Performed by: INTERNAL MEDICINE

## 2019-08-27 PROCEDURE — 90732 PPSV23 VACC 2 YRS+ SUBQ/IM: CPT | Performed by: INTERNAL MEDICINE

## 2019-08-27 PROCEDURE — 77063 BREAST TOMOSYNTHESIS BI: CPT | Performed by: INTERNAL MEDICINE

## 2019-08-27 PROCEDURE — 99213 OFFICE O/P EST LOW 20 MIN: CPT | Performed by: INTERNAL MEDICINE

## 2019-08-27 RX ORDER — LIDOCAINE 50 MG/G
PATCH TOPICAL
Qty: 30 PATCH | Refills: 0 | Status: SHIPPED | OUTPATIENT
Start: 2019-08-27 | End: 2020-04-27

## 2019-08-27 RX ORDER — HYDROCODONE BITARTRATE AND ACETAMINOPHEN 10; 325 MG/1; MG/1
1 TABLET ORAL EVERY 6 HOURS PRN
Qty: 90 TABLET | Refills: 0 | Status: SHIPPED | OUTPATIENT
Start: 2019-08-27 | End: 2019-09-27

## 2019-08-27 RX ORDER — NALOXONE HYDROCHLORIDE 4 MG/.1ML
4 SPRAY, METERED NASAL AS NEEDED
Qty: 1 KIT | Refills: 0 | Status: SHIPPED | OUTPATIENT
Start: 2019-08-27

## 2019-08-27 RX ORDER — METOPROLOL SUCCINATE 100 MG/1
100 TABLET, EXTENDED RELEASE ORAL DAILY
Qty: 90 TABLET | Refills: 0 | Status: SHIPPED | OUTPATIENT
Start: 2019-08-27 | End: 2021-05-27

## 2019-08-27 NOTE — PROGRESS NOTES
Cristofer Luo  1960 is a 61year old female.     Patient presents with:  Physical      HPI:     See below     Current Outpatient Medications:  HYDROcodone-acetaminophen (NORCO)  MG Oral Tab Take 1 tablet by mouth every 6 (six) hours as needed for Esophageal reflux    • Follicular cyst of ovary     left   • Hyperlipidemia    • Migraines    • Neuropathy     LUQ   • Opiate use 7/8/2016   • Osteoporosis 2/18/2014   • Other chronic pain 5/1/2017   • Recurrent pneumonia    • Renal stones    • Rib fractur PND (paroxsymal nocturnal dyspnea) none.  Exercise some   Gastrointestinal:   Patient denies abdominal pain but has extension of chest pain into left abd -unchanged , blood in stool, constipation, diarrhea, difficulty swallowing, change in stools, nausea, v unremarkable. Breast Mass: no palpable masses. General: unremarkable. Nipple Abnormality: none. Skin Change: none. LUNGS:   Auscultation: clear .    Chest Shape: normal .COPD changes    Percussion: normal.   Rales: no .   Respiratory effort: vincenzo (PNEUMOVAX)  -     Naloxone HCl 4 MG/0.1ML Nasal Liquid; 4 mg by Nasal route as needed. If patient remains unresponsive, repeat dose in other nostril 2-5 minutes after first dose.     EKG shows the patient have a heart rate of 67 MN interval 148 ms rest of

## 2019-09-10 DIAGNOSIS — K21.9 GASTROESOPHAGEAL REFLUX DISEASE, ESOPHAGITIS PRESENCE NOT SPECIFIED: Chronic | ICD-10-CM

## 2019-09-11 NOTE — TELEPHONE ENCOUNTER
Last OV: 8/27/19 with Dr. Padmini Mccarty  Last refill date: 4/1/19     #/refills: #30, 2 refills  When pt was asked to return for OV: 6 weeks  Upcoming appt/reason: no upcoming appt  Last labs 7/9/19

## 2019-09-12 RX ORDER — PANTOPRAZOLE SODIUM 40 MG/1
TABLET, DELAYED RELEASE ORAL
Qty: 30 TABLET | Refills: 0 | Status: SHIPPED | OUTPATIENT
Start: 2019-09-12 | End: 2019-10-17 | Stop reason: DRUGHIGH

## 2019-09-26 DIAGNOSIS — G89.29 OTHER CHRONIC PAIN: ICD-10-CM

## 2019-09-27 RX ORDER — HYDROCODONE BITARTRATE AND ACETAMINOPHEN 10; 325 MG/1; MG/1
1 TABLET ORAL EVERY 6 HOURS PRN
Qty: 90 TABLET | Refills: 0 | Status: SHIPPED | OUTPATIENT
Start: 2019-09-27 | End: 2019-10-29

## 2019-09-27 NOTE — TELEPHONE ENCOUNTER
Pt informed that Rx is ready for  from the  - pt expressed understanding.      Rx placed in controlled folder under letter \"H\"

## 2019-09-27 NOTE — TELEPHONE ENCOUNTER
No protocol    Requesting HYDROcodone-acetaminophen (NORCO)  MG Oral Tab  LOV: 8/27/19  RTC: 6 weeks  Last Relevant Labs: 7/9/19  Filled: 8/27/19 #90 with 0 refills    Future Appointments   Date Time Provider Christelle Engel   10/17/2019  9:40 AM K

## 2019-10-16 DIAGNOSIS — K21.9 GASTROESOPHAGEAL REFLUX DISEASE, ESOPHAGITIS PRESENCE NOT SPECIFIED: Chronic | ICD-10-CM

## 2019-10-17 RX ORDER — PANTOPRAZOLE SODIUM 40 MG/1
TABLET, DELAYED RELEASE ORAL
Qty: 30 TABLET | Refills: 0 | OUTPATIENT
Start: 2019-10-17

## 2019-10-17 NOTE — TELEPHONE ENCOUNTER
Last OV: 8/27/19 with Dr. Jeanette Nguyễn  Last refill date: 9/12/19     #/refills: #30, 0 refills  When pt was asked to return for OV: 6 weeks  Upcoming appt/reason: no upcoming appt  Last labs 7/9/19

## 2019-10-21 ENCOUNTER — OFFICE VISIT (OUTPATIENT)
Dept: INTERNAL MEDICINE CLINIC | Facility: CLINIC | Age: 59
End: 2019-10-21
Payer: COMMERCIAL

## 2019-10-21 VITALS
DIASTOLIC BLOOD PRESSURE: 86 MMHG | WEIGHT: 175.25 LBS | TEMPERATURE: 99 F | HEIGHT: 65 IN | OXYGEN SATURATION: 99 % | BODY MASS INDEX: 29.2 KG/M2 | RESPIRATION RATE: 16 BRPM | SYSTOLIC BLOOD PRESSURE: 136 MMHG | HEART RATE: 68 BPM

## 2019-10-21 DIAGNOSIS — M51.37 DEGENERATION OF LUMBAR OR LUMBOSACRAL INTERVERTEBRAL DISC: Primary | ICD-10-CM

## 2019-10-21 PROCEDURE — 90471 IMMUNIZATION ADMIN: CPT | Performed by: INTERNAL MEDICINE

## 2019-10-21 PROCEDURE — 99213 OFFICE O/P EST LOW 20 MIN: CPT | Performed by: INTERNAL MEDICINE

## 2019-10-21 PROCEDURE — 90686 IIV4 VACC NO PRSV 0.5 ML IM: CPT | Performed by: INTERNAL MEDICINE

## 2019-10-21 RX ORDER — CYCLOBENZAPRINE HCL 5 MG
5 TABLET ORAL NIGHTLY
Qty: 30 TABLET | Refills: 0 | Status: SHIPPED | OUTPATIENT
Start: 2019-10-21 | End: 2019-11-20

## 2019-10-21 NOTE — PROGRESS NOTES
Olga Munoz  1960 is a 61year old female.     Patient presents with:  Leg Pain      HPI:   C/o of low back pain standing off and on with periodic flareups  Cyclobenzaprine HCl 5 MG Oral Tab, Take 1 tablet (5 mg total) by mouth nightly., Disp: 30 tab DJD (degenerative joint disease) of hip     Bilateral    • Esophageal reflux    • Follicular cyst of ovary     left   • Heartburn    • High cholesterol    • Hyperlipidemia    • Leg swelling    • Migraines    • Neuropathy     LUQ   • Opiate use 7/8/2016   • no. Burning on urination no. Difficulty urinating no. Discharge none. Dysuria none. Flank pain no. Frequent Nighttime Urination none . Pain with urination none. Urinary urgency none. Vaginal bleeding none.        EXAM:   /86   Pulse 68   Temp 98.5 °F 12/21/2019).   Al Kolb MD

## 2019-10-22 ENCOUNTER — OFFICE VISIT (OUTPATIENT)
Dept: UROLOGY | Facility: HOSPITAL | Age: 59
End: 2019-10-22
Attending: OBSTETRICS & GYNECOLOGY
Payer: COMMERCIAL

## 2019-10-22 ENCOUNTER — APPOINTMENT (OUTPATIENT)
Dept: LAB | Age: 59
End: 2019-10-22
Attending: INTERNAL MEDICINE
Payer: COMMERCIAL

## 2019-10-22 VITALS
HEIGHT: 65 IN | BODY MASS INDEX: 29.16 KG/M2 | WEIGHT: 175 LBS | SYSTOLIC BLOOD PRESSURE: 128 MMHG | DIASTOLIC BLOOD PRESSURE: 68 MMHG

## 2019-10-22 DIAGNOSIS — N95.2 POSTMENOPAUSAL ATROPHIC VAGINITIS: ICD-10-CM

## 2019-10-22 DIAGNOSIS — N39.3 STRESS INCONTINENCE: ICD-10-CM

## 2019-10-22 DIAGNOSIS — N39.41 URGE INCONTINENCE: Primary | ICD-10-CM

## 2019-10-22 DIAGNOSIS — R19.4 ALTERED BOWEL HABITS: ICD-10-CM

## 2019-10-22 DIAGNOSIS — N81.11 CYSTOCELE, MIDLINE: ICD-10-CM

## 2019-10-22 DIAGNOSIS — N81.84 PELVIC MUSCLE WASTING: ICD-10-CM

## 2019-10-22 PROCEDURE — 83516 IMMUNOASSAY NONANTIBODY: CPT

## 2019-10-22 PROCEDURE — 36415 COLL VENOUS BLD VENIPUNCTURE: CPT

## 2019-10-22 PROCEDURE — 82784 ASSAY IGA/IGD/IGG/IGM EACH: CPT

## 2019-10-22 PROCEDURE — 99202 OFFICE O/P NEW SF 15 MIN: CPT

## 2019-10-22 NOTE — PROGRESS NOTES
Sohail Spaulding,   10/22/2019     Referred by Dr. Julia Russell  Pt here with self    Patient presents with:  Urinary Urgency  Urge Incontinence      HPI:  +DANG  +UUI  Urgency worse than DANG  Some sense of incomplete bladder emptying  Denies prolapse  Not sexu 11/8/2013    Performed by Tanner Cazares MD at [address]   • COCCYX N/A 10/18/2013    Performed by Tanner Cazares MD at [address]   • COCCYX N/A 9/25/2013    Performed by Tanner Cazares MD at [facility] EPIDURAL N/A 11/21/2014    Performed by Paulette Andrade MD at [facility] N/A 11/8/2013    Performed by [facility] at [facility] N/A 1/11/2016    Performed by Max March reation    Medications:  Cyclobenzaprine HCl 5 MG Oral Tab, Take 1 tablet (5 mg total) by mouth nightly., Disp: 30 tablet, Rfl: 0  Dicyclomine HCl 10 MG Oral Cap, Take 1 capsule (10 mg total) by mouth 3 (three) times daily as needed. , Disp: 90 capsule, Rfl Active: No  Avoids sexual activity due to: Other    Review of Systems:    A comprehensive 12 point review of systems was completed. Pertinent positives noted in the the HPI.   No CP  No SOB    GENERAL EXAM:  GENERAL:  Alert and Oriented, and NAD  HEENT:  N nonpharmacologic mgmt options for urinary symptoms. Discussed dietary & weight management with potential improvements in symptoms with weight loss.     Diagnostic Items:  Urodynamics  urine testing    Medications Discussed:  Estrace Cream  Fiber  Miralax  O

## 2019-10-28 DIAGNOSIS — G89.29 OTHER CHRONIC PAIN: ICD-10-CM

## 2019-10-29 NOTE — TELEPHONE ENCOUNTER
Last OV: 10/21/19 with Dr. Riki Reed  Last refill date: 9/27/19     #/refills: #90, 0 refills  When pt was asked to return for OV: 2 months  Upcoming appt/reason: no upcoming appt  Last labs 7/9/19

## 2019-10-30 ENCOUNTER — OFFICE VISIT (OUTPATIENT)
Dept: UROLOGY | Facility: HOSPITAL | Age: 59
End: 2019-10-30
Attending: OBSTETRICS & GYNECOLOGY
Payer: COMMERCIAL

## 2019-10-30 VITALS — RESPIRATION RATE: 16 BRPM | WEIGHT: 175 LBS | BODY MASS INDEX: 29.16 KG/M2 | HEIGHT: 65 IN

## 2019-10-30 DIAGNOSIS — N39.3 STRESS INCONTINENCE: Primary | ICD-10-CM

## 2019-10-30 PROCEDURE — 51741 ELECTRO-UROFLOWMETRY FIRST: CPT

## 2019-10-30 PROCEDURE — 51729 CYSTOMETROGRAM W/VP&UP: CPT

## 2019-10-30 PROCEDURE — 51784 ANAL/URINARY MUSCLE STUDY: CPT

## 2019-10-30 PROCEDURE — 87086 URINE CULTURE/COLONY COUNT: CPT

## 2019-10-30 PROCEDURE — 81001 URINALYSIS AUTO W/SCOPE: CPT

## 2019-10-30 PROCEDURE — 51797 INTRAABDOMINAL PRESSURE TEST: CPT

## 2019-10-30 RX ORDER — CALCIUM POLYCARBOPHIL 625 MG
TABLET ORAL
COMMUNITY

## 2019-10-30 RX ORDER — POLYETHYLENE GLYCOL 3350 17 G/17G
17 POWDER, FOR SOLUTION ORAL DAILY PRN
COMMUNITY
End: 2019-11-17

## 2019-10-30 NOTE — PATIENT INSTRUCTIONS
[address]  [place] [id number]  [facility]: [phone]  FAX: [phone]       Urodynamic Testing Discharge Instructions: There are NO dietary or activity restrictions.   You may resume

## 2019-10-30 NOTE — PROCEDURES
Patient here for urodynamic testing. Procedure explained and confirmed by patient. See evaluation form for results. Both verbal and written discharge instructions were given.   Patient tolerated procedure well and will follow up with Dr. Ella Elliott Negative       Urovesico Junction ( >30 degrees ):  [x]  Mobile  []  Fixed    Perineal Sensation:  [x]  Normal  []  Abnormal    Additional Notes:    PROLAPSE (past introitus):  [x]  Yes  []  No  Prolapse reduced for testing?   [x]  Yes  []  No  []  Pessary volume:       375 PLUS PLUS mL  Maximum flow rate:       10 mL/sec  Pressure Detrusor (at maximum flow):         39   cm H2O  Post void residual:     35          mL  Voiding mechanism:  [x]  Abnormal  []  Normal  [x]  Strain to void   []  Weak detrusor  Vo

## 2019-10-31 RX ORDER — HYDROCODONE BITARTRATE AND ACETAMINOPHEN 10; 325 MG/1; MG/1
1 TABLET ORAL EVERY 6 HOURS PRN
Qty: 90 TABLET | Refills: 0 | Status: SHIPPED | OUTPATIENT
Start: 2019-10-31 | End: 2019-12-03

## 2019-11-05 ENCOUNTER — OFFICE VISIT (OUTPATIENT)
Dept: UROLOGY | Facility: HOSPITAL | Age: 59
End: 2019-11-05
Attending: OBSTETRICS & GYNECOLOGY
Payer: COMMERCIAL

## 2019-11-05 VITALS
SYSTOLIC BLOOD PRESSURE: 122 MMHG | WEIGHT: 175 LBS | BODY MASS INDEX: 29.16 KG/M2 | DIASTOLIC BLOOD PRESSURE: 84 MMHG | HEIGHT: 65 IN

## 2019-11-05 DIAGNOSIS — N39.3 STRESS INCONTINENCE: Primary | ICD-10-CM

## 2019-11-05 DIAGNOSIS — N39.41 URGE INCONTINENCE: ICD-10-CM

## 2019-11-05 PROCEDURE — 99211 OFF/OP EST MAY X REQ PHY/QHP: CPT

## 2019-11-05 NOTE — PROGRESS NOTES
Pt presents w/ initial c/o UI  Urodynamic testing undergone without complication.   Results reviewed with patient  180/5cc & 375+/35cc  nursing home 450cc  No DO   DANG @ 100cc    Discussed with patient mgmt options for DANG, UUI    UUI > DANG    Discussed dietary and

## 2019-11-11 NOTE — TELEPHONE ENCOUNTER
Patient paged this evening requesting refill as \"I can hardly open my mouth\" and \"I called there 3 times today\". denavir cream refilled and sent to her requested deana.

## 2019-11-11 NOTE — TELEPHONE ENCOUNTER
Patient calling on status of topical; she \"cannot hardly use her mouth\"  Advised pending with doctor for refill

## 2019-11-12 ENCOUNTER — TELEPHONE (OUTPATIENT)
Dept: INTERNAL MEDICINE CLINIC | Facility: CLINIC | Age: 59
End: 2019-11-12

## 2019-11-12 RX ORDER — ACYCLOVIR 50 MG/G
1 OINTMENT TOPICAL
Qty: 5 G | Refills: 1 | Status: SHIPPED | OUTPATIENT
Start: 2019-11-12 | End: 2019-11-19

## 2019-11-12 NOTE — TELEPHONE ENCOUNTER
Denavir not covered by insurance alternative would be     Acyclovir,   Famciclovir  Valacyclovir   Zovirax     Please advise.

## 2019-11-14 ENCOUNTER — HOSPITAL ENCOUNTER (OUTPATIENT)
Age: 59
Discharge: HOME | End: 2019-11-14
Attending: EMERGENCY MEDICINE
Payer: COMMERCIAL

## 2019-11-14 VITALS
BODY MASS INDEX: 28.49 KG/M2 | TEMPERATURE: 98 F | HEIGHT: 65 IN | HEART RATE: 76 BPM | OXYGEN SATURATION: 98 % | DIASTOLIC BLOOD PRESSURE: 96 MMHG | RESPIRATION RATE: 16 BRPM | WEIGHT: 171 LBS | SYSTOLIC BLOOD PRESSURE: 156 MMHG

## 2019-11-14 DIAGNOSIS — S00.522A BLISTER (NONTHERMAL) OF ORAL CAVITY, INITIAL ENCOUNTER: Primary | ICD-10-CM

## 2019-11-14 PROCEDURE — 87205 SMEAR GRAM STAIN: CPT | Performed by: EMERGENCY MEDICINE

## 2019-11-14 PROCEDURE — 87529 HSV DNA AMP PROBE: CPT | Performed by: EMERGENCY MEDICINE

## 2019-11-14 PROCEDURE — 87070 CULTURE OTHR SPECIMN AEROBIC: CPT | Performed by: EMERGENCY MEDICINE

## 2019-11-14 PROCEDURE — 99213 OFFICE O/P EST LOW 20 MIN: CPT

## 2019-11-14 PROCEDURE — 99214 OFFICE O/P EST MOD 30 MIN: CPT

## 2019-11-15 NOTE — ED PROVIDER NOTES
Patient Seen in: [facility]      History   Patient presents with:  Mouth Cold Sores (respiratory/integumentary)    Stated Complaint: MOUTH SORES X 7 DAYS    HPI    This is a 51-year-old female that presents with oral lesions on Anival Oviedo MD at [address] [place] [address]      08/2014   • CAUDAL N/A 11/30/2015    Performed by Clovis Samson MD at [address] [place] [address] Ave N/A 9/10/2015    Performed by Clovis Samson MD at [facility] LAPAROSCOPIC CHOLECYSTECTOMY  4/2010   • LAPAROSCOPY,DIAGNOSTIC  [id number]   • LUMBAR EPIDURAL N/A 1/26/2015    Performed by Nitin Luna MD at [address] [place] [address] N/A 12/29/2014    Performed by Nitin Luna MD at [facility] Never Used    Alcohol use: No      Alcohol/week: 0.0 standard drinks    Drug use: No             Review of Systems    Positive for stated complaint: MOUTH SORES X 7 DAYS  Other systems are as noted in HPI. Constitutional and vital signs reviewed.       All 11/18/2019          Medications Prescribed:  Current Discharge Medication List

## 2019-11-17 ENCOUNTER — HOSPITAL ENCOUNTER (OUTPATIENT)
Age: 59
Discharge: HOME | End: 2019-11-17
Attending: FAMILY MEDICINE
Payer: COMMERCIAL

## 2019-11-17 VITALS
DIASTOLIC BLOOD PRESSURE: 72 MMHG | HEART RATE: 68 BPM | RESPIRATION RATE: 20 BRPM | TEMPERATURE: 99 F | OXYGEN SATURATION: 96 % | BODY MASS INDEX: 28.49 KG/M2 | HEIGHT: 65 IN | SYSTOLIC BLOOD PRESSURE: 137 MMHG | WEIGHT: 171 LBS

## 2019-11-17 DIAGNOSIS — K13.79 MOUTH SORES: Primary | ICD-10-CM

## 2019-11-17 PROCEDURE — 99213 OFFICE O/P EST LOW 20 MIN: CPT

## 2019-11-17 PROCEDURE — 99214 OFFICE O/P EST MOD 30 MIN: CPT

## 2019-11-17 RX ORDER — VALACYCLOVIR HYDROCHLORIDE 1 G/1
TABLET, FILM COATED ORAL
Qty: 14 TABLET | Refills: 1 | Status: SHIPPED | OUTPATIENT
Start: 2019-11-17 | End: 2019-12-12 | Stop reason: ALTCHOICE

## 2019-11-17 NOTE — ED NOTES
Pt. Calling stating she is returning our call. After verifying pt. ID, gave her negative results for HSV & aerobic culture. Pt. States it appears that the spots are spreading & maybe bubbling up with greenish color.  Pt. States it is hard to open mouth due

## 2019-11-17 NOTE — ED NOTES
Called to patient, message left to voicemail to call [facility] back.   (this is to notify of final Aerobic Bacterial and HSV 1/2  results.)

## 2019-11-17 NOTE — ED INITIAL ASSESSMENT (HPI)
Pt states seen here on 11/14 for mouth sores to lower lip. Had cultures sent. States is now spreading to upper lip.

## 2019-11-18 ENCOUNTER — OFFICE VISIT (OUTPATIENT)
Dept: INTERNAL MEDICINE CLINIC | Facility: CLINIC | Age: 59
End: 2019-11-18
Payer: COMMERCIAL

## 2019-11-18 VITALS
DIASTOLIC BLOOD PRESSURE: 84 MMHG | TEMPERATURE: 98 F | BODY MASS INDEX: 29.87 KG/M2 | SYSTOLIC BLOOD PRESSURE: 130 MMHG | WEIGHT: 179.25 LBS | HEIGHT: 65 IN | HEART RATE: 78 BPM

## 2019-11-18 DIAGNOSIS — R59.0 ANTERIOR CERVICAL LYMPHADENOPATHY: Primary | ICD-10-CM

## 2019-11-18 PROCEDURE — 99213 OFFICE O/P EST LOW 20 MIN: CPT | Performed by: INTERNAL MEDICINE

## 2019-11-18 RX ORDER — PANTOPRAZOLE SODIUM 20 MG/1
TABLET, DELAYED RELEASE ORAL
COMMUNITY
Start: 2019-11-17 | End: 2020-01-09 | Stop reason: DRUGHIGH

## 2019-11-18 RX ORDER — ONDANSETRON 4 MG/1
4 TABLET, FILM COATED ORAL EVERY 8 HOURS PRN
Qty: 20 TABLET | Refills: 2 | Status: SHIPPED | OUTPATIENT
Start: 2019-11-18 | End: 2021-11-23

## 2019-11-18 RX ORDER — AMOXICILLIN AND CLAVULANATE POTASSIUM 500; 125 MG/1; MG/1
1 TABLET, FILM COATED ORAL 2 TIMES DAILY
Qty: 20 TABLET | Refills: 0 | Status: SHIPPED | OUTPATIENT
Start: 2019-11-18 | End: 2019-11-28

## 2019-11-18 NOTE — PROGRESS NOTES
Shlomo Godinez ADIA 1960 is a 61year old female who presents for upper respiratory symptoms    Patient presents with:  Urgent Care F/u: swollen glands, mouth sores   Runny Nose: runny nose congestion head congestion         HPI:   Pt reports  respiratory AS NEEDED FOR COLD SORES 5 g 0   • Senna-Docusate Sodium 8.6-50 MG Oral Tab Take 1 tablet by mouth daily. 30 tablet 0   • Fluticasone Propionate 50 MCG/ACT Nasal Suspension 2 sprays by Each Nare route daily.  1 Bottle 0      Past Medical History:   Dony Cazares exertion  GI: no nausea or abdominal pain  NEURO: denies headaches    EXAM:   /84 (BP Location: Right arm, Patient Position: Sitting, Cuff Size: adult)   Pulse 78   Temp 98.1 °F (36.7 °C) (Oral)   Ht 65\"   Wt 179 lb 4 oz (81.3 kg)   BMI 29.83 kg/m²

## 2019-11-26 DIAGNOSIS — G89.29 OTHER CHRONIC PAIN: ICD-10-CM

## 2019-11-26 RX ORDER — HYDROCODONE BITARTRATE AND ACETAMINOPHEN 10; 325 MG/1; MG/1
1 TABLET ORAL EVERY 6 HOURS PRN
Qty: 90 TABLET | Refills: 0 | OUTPATIENT
Start: 2019-11-26

## 2019-11-26 NOTE — TELEPHONE ENCOUNTER
Patient calling in requesting a refill for RX HYDROcodone-acetaminophen [facility])  MG Oral Tab    Pharmacy:  [id number], [address] [place] [address] Carolin OZUNA, [phone], [phone]

## 2019-11-26 NOTE — TELEPHONE ENCOUNTER
No protocol     Last refill:  10/31/2019  Norco 90 tablet NR    LOV:   11/18/2019 Dr Becerra Age RTC 2 weeks   FOV scheduled 12/3/2019 Caridad Kern

## 2019-11-30 ENCOUNTER — HOSPITAL ENCOUNTER (OUTPATIENT)
Age: 59
Discharge: HOME | End: 2019-11-30
Payer: COMMERCIAL

## 2019-11-30 VITALS
TEMPERATURE: 98 F | WEIGHT: 171 LBS | HEART RATE: 56 BPM | BODY MASS INDEX: 28 KG/M2 | DIASTOLIC BLOOD PRESSURE: 78 MMHG | SYSTOLIC BLOOD PRESSURE: 127 MMHG | OXYGEN SATURATION: 95 % | RESPIRATION RATE: 16 BRPM

## 2019-11-30 DIAGNOSIS — B00.1 COLD SORE: Primary | ICD-10-CM

## 2019-11-30 PROCEDURE — 99213 OFFICE O/P EST LOW 20 MIN: CPT

## 2019-11-30 PROCEDURE — 99214 OFFICE O/P EST MOD 30 MIN: CPT

## 2019-11-30 RX ORDER — VALACYCLOVIR HYDROCHLORIDE 1 G/1
1 TABLET, FILM COATED ORAL EVERY 12 HOURS SCHEDULED
Qty: 14 TABLET | Refills: 0 | Status: SHIPPED | OUTPATIENT
Start: 2019-11-30 | End: 2019-12-07

## 2019-11-30 RX ORDER — CEPHALEXIN 500 MG/1
500 CAPSULE ORAL 2 TIMES DAILY
Qty: 14 CAPSULE | Refills: 0 | Status: SHIPPED | OUTPATIENT
Start: 2019-11-30 | End: 2019-12-03

## 2019-11-30 NOTE — ED PROVIDER NOTES
Patient Seen in: [facility] In [facility]      History   Patient presents with:  Mouth Cold Sores (respiratory/integumentary)    Stated Complaint: F/U CANKER SORES    HPI    Patient is a 49-year-old female who presents for follow-up on canker Ventral hernia 2002   • Visual impairment     glasses   • Wears glasses               Past Surgical History:   Procedure Laterality Date   • BURSA INJECTION Left 7/30/2015    Performed by Annelise Grande MD at [place] [address] at [address] [place] [address] (PAIN) N/A 3/26/2015    Performed by Amina Lowe MD at [address]      partial, at age 29   • LAPAROSCOPIC CHOLECYSTECTOMY  4/2010   • Selvin Use      Smoking status: Former Smoker        Packs/day: 1.00        Years: 32.00        Pack years: 28        Quit date: 3/29/2008        Years since quittin.6      Smokeless tobacco: Never Used    Alcohol use: No      Alcohol/week: 0.0 standard drin edema. Homans NEG. Dorsalis Pedis 2+. LYMPH:  Bilateral submandibular LAD. NEURO: A&Ox3. CN II-XII intact. No focal deficits. Coordination and Gait normal.  Kernig and Brudzinski's are negative.              MDM     Lesions appear to be viral.  The

## 2019-11-30 NOTE — ED INITIAL ASSESSMENT (HPI)
Patient presents for a follow up visit due to sores on her lips. Patient was seen twice in the Immediate care due to the sores. Patient states that initially the sores started on the lower lip then started on the upper lip. Denies sores in mouth or fever.

## 2019-12-02 ENCOUNTER — TELEPHONE (OUTPATIENT)
Dept: INTERNAL MEDICINE CLINIC | Facility: CLINIC | Age: 59
End: 2019-12-02

## 2019-12-02 NOTE — TELEPHONE ENCOUNTER
Pt c/o swelling, redness, and burning on mainly the upper lip. Pt denies tongue swelling, shortness of breath, or difficulty breathing. Pt stated symptoms worsened since taking the cephalexin. Pt stated she has tried oral benadryl but it has not helped.  Ad

## 2019-12-02 NOTE — TELEPHONE ENCOUNTER
Pt with swelling and redness outside of mouth with burning after starting medication Cephalexin ( 11/30/19)  Not eating, drinking minimal water. Denies jaw pain, fever. Pt taking medication as prescribed, due to take another dose this afternoon.   Orig

## 2019-12-03 ENCOUNTER — OFFICE VISIT (OUTPATIENT)
Dept: INTERNAL MEDICINE CLINIC | Facility: CLINIC | Age: 59
End: 2019-12-03

## 2019-12-03 ENCOUNTER — TELEPHONE (OUTPATIENT)
Dept: INTERNAL MEDICINE CLINIC | Facility: CLINIC | Age: 59
End: 2019-12-03

## 2019-12-03 VITALS
OXYGEN SATURATION: 98 % | RESPIRATION RATE: 16 BRPM | HEIGHT: 65 IN | BODY MASS INDEX: 29.07 KG/M2 | WEIGHT: 174.5 LBS | HEART RATE: 98 BPM | DIASTOLIC BLOOD PRESSURE: 84 MMHG | SYSTOLIC BLOOD PRESSURE: 134 MMHG

## 2019-12-03 DIAGNOSIS — R60.0 LIP EDEMA: ICD-10-CM

## 2019-12-03 DIAGNOSIS — L01.00 IMPETIGO: Primary | ICD-10-CM

## 2019-12-03 DIAGNOSIS — G89.29 OTHER CHRONIC PAIN: ICD-10-CM

## 2019-12-03 PROCEDURE — 99214 OFFICE O/P EST MOD 30 MIN: CPT | Performed by: NURSE PRACTITIONER

## 2019-12-03 RX ORDER — HYDROCODONE BITARTRATE AND ACETAMINOPHEN 10; 325 MG/1; MG/1
1 TABLET ORAL EVERY 8 HOURS PRN
Qty: 90 TABLET | Refills: 0 | Status: SHIPPED | OUTPATIENT
Start: 2019-12-03 | End: 2019-12-28

## 2019-12-03 RX ORDER — HYDROCODONE BITARTRATE AND ACETAMINOPHEN 10; 325 MG/1; MG/1
1 TABLET ORAL EVERY 8 HOURS PRN
Qty: 90 TABLET | Refills: 0 | Status: SHIPPED | OUTPATIENT
Start: 2019-12-03 | End: 2019-12-03

## 2019-12-03 RX ORDER — MUPIROCIN CALCIUM 20 MG/G
1 CREAM TOPICAL 3 TIMES DAILY
Qty: 30 G | Refills: 0 | Status: SHIPPED | OUTPATIENT
Start: 2019-12-03 | End: 2019-12-03

## 2019-12-03 RX ORDER — PREDNISONE 10 MG/1
TABLET ORAL
Qty: 20 TABLET | Refills: 0 | Status: SHIPPED | OUTPATIENT
Start: 2019-12-03 | End: 2019-12-03

## 2019-12-03 RX ORDER — MUPIROCIN CALCIUM 20 MG/G
1 CREAM TOPICAL 3 TIMES DAILY
Qty: 30 G | Refills: 0 | Status: SHIPPED | OUTPATIENT
Start: 2019-12-03 | End: 2019-12-12

## 2019-12-03 RX ORDER — PREDNISONE 10 MG/1
TABLET ORAL
Qty: 20 TABLET | Refills: 0 | Status: SHIPPED | OUTPATIENT
Start: 2019-12-03 | End: 2019-12-12 | Stop reason: ALTCHOICE

## 2019-12-03 NOTE — PROGRESS NOTES
CHIEF COMPLAINT:     Patient presents with: Other: mouth sores      HPI:   Pedro Noonan is a 61year old female who is here for follow up of worsening blisters and discharge from her lips. This has been ongoing for the past month.  Pt has been in UC 3 x and 10 MG Oral Cap Take 1 capsule (10 mg total) by mouth 3 (three) times daily as needed. 90 capsule 1   • Naloxone HCl 4 MG/0.1ML Nasal Liquid 4 mg by Nasal route as needed.  If patient remains unresponsive, repeat dose in other nostril 2-5 minutes after first Packs/day: 1.00        Years: 32.00        Pack years: 28        Quit date: 3/29/2008        Years since quittin.6      Smokeless tobacco: Never Used    Alcohol use: No      Alcohol/week: 0.0 standard drinks    Drug use: No       REVIEW OF SYSTEMS: days, 20 mg (2 tabs) for 2 days and 10 mg (1 tab) for 2 days  Dispense: 20 tablet; Refill: 0      PLAN:    The patient indicates understanding of these issues and agrees to the plan. Call with update in 2-3 days. If no improvement will add Clindamycin.  If

## 2019-12-04 ENCOUNTER — TELEPHONE (OUTPATIENT)
Dept: INTERNAL MEDICINE CLINIC | Facility: CLINIC | Age: 59
End: 2019-12-04

## 2019-12-04 RX ORDER — CLINDAMYCIN HYDROCHLORIDE 300 MG/1
300 CAPSULE ORAL 3 TIMES DAILY
Qty: 21 CAPSULE | Refills: 0 | Status: SHIPPED | OUTPATIENT
Start: 2019-12-04 | End: 2019-12-11

## 2019-12-04 NOTE — TELEPHONE ENCOUNTER
Hydrocodone-Acetaminophen  mg requiring PA. Ok to start PA per Helen. Key: [id number]    Your information has been submitted to [employer]. [employer] is reviewing the PA request and you will receive an electronic response.  You may check for t

## 2019-12-04 NOTE — TELEPHONE ENCOUNTER
If antibiotic on order start clindamycin 300 mg PO TID x 7 days. Continue steroid. Give information for THE [facility] if continues to worsen.  Any provider she can see the soonest.

## 2019-12-09 ENCOUNTER — TELEPHONE (OUTPATIENT)
Dept: INTERNAL MEDICINE CLINIC | Facility: CLINIC | Age: 59
End: 2019-12-09

## 2019-12-09 RX ORDER — FLUCONAZOLE 100 MG/1
100 TABLET ORAL DAILY
Qty: 3 TABLET | Refills: 0 | Status: SHIPPED | OUTPATIENT
Start: 2019-12-09 | End: 2019-12-12

## 2019-12-09 NOTE — TELEPHONE ENCOUNTER
Pt states has white coating inside her cheeks and inside of mouth ,Blisters and lip discharge slowly improving req medication for thrush

## 2019-12-09 NOTE — TELEPHONE ENCOUNTER
Pt called again - condition worsening throughout the day. Pt does have hx of thrush. Pt states Dr. Jenny Red has previuosly prescribed medication for thrush for pt.  Pt requests VM respond to message if KR is unavailable

## 2019-12-12 ENCOUNTER — OFFICE VISIT (OUTPATIENT)
Dept: INTERNAL MEDICINE CLINIC | Facility: CLINIC | Age: 59
End: 2019-12-12

## 2019-12-12 VITALS
WEIGHT: 174 LBS | TEMPERATURE: 98 F | DIASTOLIC BLOOD PRESSURE: 86 MMHG | BODY MASS INDEX: 28.99 KG/M2 | OXYGEN SATURATION: 95 % | HEIGHT: 65 IN | HEART RATE: 77 BPM | SYSTOLIC BLOOD PRESSURE: 136 MMHG | RESPIRATION RATE: 16 BRPM

## 2019-12-12 DIAGNOSIS — B37.0 ORAL THRUSH: ICD-10-CM

## 2019-12-12 DIAGNOSIS — K13.0 LIP ULCER: Primary | ICD-10-CM

## 2019-12-12 PROCEDURE — 87205 SMEAR GRAM STAIN: CPT | Performed by: INTERNAL MEDICINE

## 2019-12-12 PROCEDURE — 87077 CULTURE AEROBIC IDENTIFY: CPT | Performed by: INTERNAL MEDICINE

## 2019-12-12 PROCEDURE — 87070 CULTURE OTHR SPECIMN AEROBIC: CPT | Performed by: INTERNAL MEDICINE

## 2019-12-12 PROCEDURE — 99213 OFFICE O/P EST LOW 20 MIN: CPT | Performed by: INTERNAL MEDICINE

## 2019-12-12 PROCEDURE — 87252 VIRUS INOCULATION TISSUE: CPT | Performed by: INTERNAL MEDICINE

## 2019-12-12 RX ORDER — FAMCICLOVIR 500 MG/1
500 TABLET, FILM COATED ORAL 3 TIMES DAILY
Qty: 21 TABLET | Refills: 0 | Status: SHIPPED | OUTPATIENT
Start: 2019-12-12 | End: 2019-12-19

## 2019-12-12 RX ORDER — FLUCONAZOLE 100 MG/1
100 TABLET ORAL DAILY
Qty: 3 TABLET | Refills: 0 | Status: SHIPPED | OUTPATIENT
Start: 2019-12-12 | End: 2019-12-15

## 2019-12-12 NOTE — PROGRESS NOTES
Dang Gaspar  1960 is a 61year old female who presents for upper respiratory symptoms    Patient presents with:  Swelling        HPI:   Complains of lip edema.   Patient went to the emergency room did see physician assistant all meds have been comple intervertebral disc 9/25/2013   • DJD (degenerative joint disease) of hip     Bilateral    • Esophageal reflux    • Follicular cyst of ovary     left   • Heartburn    • High cholesterol    • Hyperlipidemia    • Leg swelling    • Migraines    • Neuropathy supple, neg adenopathy,no bruits  LUNGS: clear to auscultation. air entry equal.no chest wall tenderness. wheeze neg  CARDIO: RRR without murmur  GI: good BS's,no masses, HSM or tenderness    ASSESSMENT AND PLAN:   Adele Oglesby was seen today for swelling.     Diag

## 2019-12-16 ENCOUNTER — APPOINTMENT (OUTPATIENT)
Dept: CT IMAGING | Age: 59
End: 2019-12-16
Attending: FAMILY MEDICINE
Payer: COMMERCIAL

## 2019-12-16 ENCOUNTER — OFFICE VISIT (OUTPATIENT)
Dept: INTERNAL MEDICINE CLINIC | Facility: CLINIC | Age: 59
End: 2019-12-16

## 2019-12-16 ENCOUNTER — HOSPITAL ENCOUNTER (OUTPATIENT)
Age: 59
Discharge: HOME | End: 2019-12-16
Attending: FAMILY MEDICINE
Payer: COMMERCIAL

## 2019-12-16 VITALS
DIASTOLIC BLOOD PRESSURE: 80 MMHG | TEMPERATURE: 98 F | HEART RATE: 66 BPM | SYSTOLIC BLOOD PRESSURE: 122 MMHG | OXYGEN SATURATION: 98 %

## 2019-12-16 VITALS
HEART RATE: 64 BPM | OXYGEN SATURATION: 97 % | SYSTOLIC BLOOD PRESSURE: 110 MMHG | BODY MASS INDEX: 28.32 KG/M2 | DIASTOLIC BLOOD PRESSURE: 76 MMHG | HEIGHT: 65 IN | WEIGHT: 170 LBS | TEMPERATURE: 99 F | RESPIRATION RATE: 18 BRPM

## 2019-12-16 DIAGNOSIS — K13.0 LIP ULCER: Primary | ICD-10-CM

## 2019-12-16 DIAGNOSIS — N30.90 CYSTITIS: Primary | ICD-10-CM

## 2019-12-16 PROCEDURE — 74176 CT ABD & PELVIS W/O CONTRAST: CPT | Performed by: FAMILY MEDICINE

## 2019-12-16 PROCEDURE — 99214 OFFICE O/P EST MOD 30 MIN: CPT

## 2019-12-16 PROCEDURE — 99213 OFFICE O/P EST LOW 20 MIN: CPT | Performed by: INTERNAL MEDICINE

## 2019-12-16 PROCEDURE — 81002 URINALYSIS NONAUTO W/O SCOPE: CPT | Performed by: FAMILY MEDICINE

## 2019-12-16 RX ORDER — SULFAMETHOXAZOLE AND TRIMETHOPRIM 800; 160 MG/1; MG/1
1 TABLET ORAL 2 TIMES DAILY
Qty: 14 TABLET | Refills: 0 | Status: SHIPPED | OUTPATIENT
Start: 2019-12-16 | End: 2019-12-23

## 2019-12-16 NOTE — PROGRESS NOTES
Sundeep Fields  1960 is a 61year old female who presents for upper respiratory symptoms    Patient presents with: Follow - Up        75% better.   Current Outpatient Medications   Medication Sig Dispense Refill   • Famciclovir 500 MG Oral Tab Take 1 t Neuropathy     LUQ   • Opiate use 7/8/2016   • Osteoporosis 2/18/2014   • Other chronic pain 5/1/2017   • Presence of other cardiac implants and grafts    • Recurrent pneumonia    • Renal stones    • Rib fracture 6/30/2013   • Sciatica 9/25/2013   • Shortn visit:    Lip ulcer    Finish medicines see ENT if no better lab results discussed with patient. Viral culture is pending    Patient Instructions   finish medicines      The patient indicates understanding of these issues and agrees to the plan.   The emmanuel

## 2019-12-16 NOTE — ED INITIAL ASSESSMENT (HPI)
Pt here c/o lt flank pain for last 4-5 days. Noticed blood in urine. C/o pain on urination. Frequent urination. Fever yesterday, denies today.

## 2019-12-17 NOTE — ED PROVIDER NOTES
Patient Seen in: [facility] END      History   Patient presents with:  Back Pain    Stated Complaint: flank pain,blood in urine     HPI    17-year-old female presents for urinary symptoms and left flank pain.   Patient states she has Eugene Jennings MD at [address] [place] [address]      08/2014   • CAUDAL N/A 11/30/2015    Performed by Damien Jules MD at [address] [place] [address] Ave N/A 9/10/2015    Performed by Damien Jules MD at [facility] LAPAROSCOPIC CHOLECYSTECTOMY  4/2010   • LAPAROSCOPY,DIAGNOSTIC  [id number]   • LUMBAR EPIDURAL N/A 1/26/2015    Performed by Nitin Luna MD at [address] [place] [address] N/A 12/29/2014    Performed by Nitin Luna MD at [facility] Never Used    Alcohol use: No      Alcohol/week: 0.0 standard drinks    Drug use: No             Review of Systems    Positive for stated complaint: flank pain,blood in urine   Other systems are as noted in HPI. Constitutional and vital signs reviewed. appendix is normal.  No evidence of an acute inflammatory process. Advised to increase p.o. fluids, take Bactrim as prescribed. Monitor for worsening symptoms, follow with the PCP if not better.       Disposition and Plan     Clinical Impression:  Cysti

## 2019-12-28 DIAGNOSIS — G89.29 OTHER CHRONIC PAIN: ICD-10-CM

## 2019-12-28 RX ORDER — HYDROCODONE BITARTRATE AND ACETAMINOPHEN 10; 325 MG/1; MG/1
1 TABLET ORAL EVERY 8 HOURS PRN
Qty: 90 TABLET | Refills: 0 | Status: SHIPPED | OUTPATIENT
Start: 2019-12-28 | End: 2020-01-31

## 2019-12-28 NOTE — TELEPHONE ENCOUNTER
Requesting HYDROcodone-acetaminophen ([address])  MG Oral Tab  LOV: 12/16/19  RTC: NA  Last Relevant Labs: 7/9/19  Filled: 12/3/19 #90 with 0 refills    Future Appointments   Date Time Provider Christelle Engel   1/9/2020 10:40 AM Nash Nail, DO

## 2020-01-13 ENCOUNTER — TELEPHONE (OUTPATIENT)
Dept: INTERNAL MEDICINE CLINIC | Facility: CLINIC | Age: 60
End: 2020-01-13

## 2020-01-13 DIAGNOSIS — K13.0 LIP ULCER: Primary | ICD-10-CM

## 2020-01-13 NOTE — TELEPHONE ENCOUNTER
Patient is requesting another referral to see Dr. Negrito [facility] ENT for additional visits. Please advise.

## 2020-01-14 ENCOUNTER — HOSPITAL ENCOUNTER (OUTPATIENT)
Dept: GENERAL RADIOLOGY | Age: 60
Discharge: HOME | End: 2020-01-14
Attending: PHYSICIAN ASSISTANT
Payer: COMMERCIAL

## 2020-01-14 ENCOUNTER — OFFICE VISIT (OUTPATIENT)
Dept: INTERNAL MEDICINE CLINIC | Facility: CLINIC | Age: 60
End: 2020-01-14

## 2020-01-14 ENCOUNTER — TELEPHONE (OUTPATIENT)
Dept: INTERNAL MEDICINE CLINIC | Facility: CLINIC | Age: 60
End: 2020-01-14

## 2020-01-14 VITALS
BODY MASS INDEX: 28.86 KG/M2 | DIASTOLIC BLOOD PRESSURE: 90 MMHG | HEIGHT: 65 IN | SYSTOLIC BLOOD PRESSURE: 122 MMHG | WEIGHT: 173.25 LBS | OXYGEN SATURATION: 98 % | RESPIRATION RATE: 16 BRPM | HEART RATE: 70 BPM | TEMPERATURE: 98 F

## 2020-01-14 DIAGNOSIS — R05.9 COUGH: Primary | ICD-10-CM

## 2020-01-14 DIAGNOSIS — R05.9 COUGH: ICD-10-CM

## 2020-01-14 DIAGNOSIS — J01.90 ACUTE SINUSITIS, RECURRENCE NOT SPECIFIED, UNSPECIFIED LOCATION: ICD-10-CM

## 2020-01-14 DIAGNOSIS — K13.0 LIP ULCER: ICD-10-CM

## 2020-01-14 DIAGNOSIS — N39.46 URINARY INCONTINENCE, MIXED: ICD-10-CM

## 2020-01-14 DIAGNOSIS — H65.191 ACUTE OTITIS MEDIA WITH EFFUSION OF RIGHT EAR: ICD-10-CM

## 2020-01-14 PROCEDURE — 71046 X-RAY EXAM CHEST 2 VIEWS: CPT | Performed by: PHYSICIAN ASSISTANT

## 2020-01-14 PROCEDURE — 99214 OFFICE O/P EST MOD 30 MIN: CPT | Performed by: PHYSICIAN ASSISTANT

## 2020-01-14 RX ORDER — METOPROLOL SUCCINATE 50 MG/1
TABLET, EXTENDED RELEASE ORAL
COMMUNITY
Start: 2019-12-29 | End: 2020-10-18

## 2020-01-14 RX ORDER — FLUCONAZOLE 200 MG/1
TABLET ORAL
COMMUNITY
Start: 2020-01-13

## 2020-01-14 RX ORDER — VALACYCLOVIR HYDROCHLORIDE 1 G/1
TABLET, FILM COATED ORAL
COMMUNITY
Start: 2020-01-13 | End: 2021-10-21

## 2020-01-14 RX ORDER — AMOXICILLIN AND CLAVULANATE POTASSIUM 875; 125 MG/1; MG/1
1 TABLET, FILM COATED ORAL 2 TIMES DAILY
Qty: 14 TABLET | Refills: 0 | Status: SHIPPED | OUTPATIENT
Start: 2020-01-14 | End: 2020-01-21

## 2020-01-14 NOTE — PATIENT INSTRUCTIONS
Chest x-ray today. Start augmentin - 1 tablet twice a day with food. Start Flonase - 2 sprays in each nostril daily. Start Robitussin-DM for cough (OTC).     Start albuterol inhaler - 1-2 puffs every 4-6 hours as needed for chest tightness, shortne

## 2020-01-14 NOTE — PROGRESS NOTES
HPI:  Sundeep Fields is a 61year old female who presents for URI symptoms x 7 days. Initially had a fever up to 101, chills (now improved).   Now c/o cough with green sputum production, sore throat, nasal congestion and drainage, sinus pressure, headache, kesha 9/10/2015    Performed by Amina Lowe MD at [address]   • COCCYX N/A 11/8/2013    Performed by Amina Lowe MD at [address]   • COCCYX N/A 10/18/2013    Performed by Amina Lowe MD at [address] [facility] 12/29/2014    Performed by Michael Sevilla MD at [address] [place] [address] N/A 11/21/2014    Performed by Michael Sevilla MD at [address] [place] [address] N/A 11/8/2013    Performed by Michael Sevilla MD at 122/90 (BP Location: Left arm, Patient Position: Sitting, Cuff Size: adult)   Pulse 70   Temp 98.3 °F (36.8 °C) (Oral)   Resp 16   Ht 65\"   Wt 173 lb 4 oz (78.6 kg)   SpO2 98%   Breastfeeding No   BMI 28.83 kg/m²   GENERAL: A&O, well developed, well david

## 2020-01-14 NOTE — TELEPHONE ENCOUNTER
FMLA forms received from [address] [place] [address] [facility] and placed in providers in box for completion.

## 2020-01-20 NOTE — TELEPHONE ENCOUNTER
FMLA forms received from provider - pt informed that forms have been completed - pt asked for forms to be faxed to [facility] source @ [phone].   Copies made for scan and pod FMLA folder as well as copy placed at  per pts request.

## 2020-01-30 DIAGNOSIS — G89.29 OTHER CHRONIC PAIN: ICD-10-CM

## 2020-01-31 RX ORDER — HYDROCODONE BITARTRATE AND ACETAMINOPHEN 10; 325 MG/1; MG/1
1 TABLET ORAL EVERY 8 HOURS PRN
Qty: 90 TABLET | Refills: 0 | Status: SHIPPED | OUTPATIENT
Start: 2020-01-31 | End: 2020-03-03

## 2020-01-31 NOTE — TELEPHONE ENCOUNTER
norco  Last OV relevant to medication: 12-3-19  Last refill date: 12-28-19  #/refills: 0  When pt was asked to return for OV: none  Upcoming appt/reason: none  Recent labs: none

## 2020-01-31 NOTE — TELEPHONE ENCOUNTER
Pt informed that Rx is ready for  from the  - pt expressed understanding.     Rx placed in controlled substance folder under letter \"H\"

## 2020-02-06 ENCOUNTER — TELEPHONE (OUTPATIENT)
Dept: UROLOGY | Facility: HOSPITAL | Age: 60
End: 2020-02-06

## 2020-02-06 NOTE — TELEPHONE ENCOUNTER
Called patient to inquire about completing Physical therapy for an updating appointment with Dr Alexandria Colon 2/11/2020. Unable to leave message no voicemail message.

## 2020-02-11 ENCOUNTER — OFFICE VISIT (OUTPATIENT)
Dept: UROLOGY | Facility: HOSPITAL | Age: 60
End: 2020-02-11
Attending: OBSTETRICS & GYNECOLOGY
Payer: COMMERCIAL

## 2020-02-11 VITALS — DIASTOLIC BLOOD PRESSURE: 76 MMHG | WEIGHT: 173 LBS | BODY MASS INDEX: 29 KG/M2 | SYSTOLIC BLOOD PRESSURE: 132 MMHG

## 2020-02-11 DIAGNOSIS — N39.41 URGE INCONTINENCE: ICD-10-CM

## 2020-02-11 DIAGNOSIS — N39.3 STRESS INCONTINENCE: Primary | ICD-10-CM

## 2020-02-11 PROCEDURE — 99212 OFFICE O/P EST SF 10 MIN: CPT

## 2020-02-11 RX ORDER — FUROSEMIDE 20 MG/1
20 TABLET ORAL 2 TIMES DAILY
COMMUNITY
End: 2020-10-26

## 2020-02-11 RX ORDER — OXYBUTYNIN CHLORIDE 5 MG/1
5 TABLET, EXTENDED RELEASE ORAL DAILY
Qty: 30 TABLET | Refills: 2 | Status: SHIPPED | OUTPATIENT
Start: 2020-02-11 | End: 2020-04-28

## 2020-02-11 NOTE — PROGRESS NOTES
Patient presents to follow up UUI & DANG    She is currently using bladder diet/drill  Recommended PT, didn't go, was unable to schedule  UUI worsening  DANG persists    Denies s/sx of UTI    /76   Wt 173 lb (78.5 kg)   BMI 28.79 kg/m²     GEN: NAD  CV

## 2020-03-02 DIAGNOSIS — G89.29 OTHER CHRONIC PAIN: ICD-10-CM

## 2020-03-02 NOTE — TELEPHONE ENCOUNTER
Patient calling for refill on [facility] please call when ready for

## 2020-03-03 RX ORDER — HYDROCODONE BITARTRATE AND ACETAMINOPHEN 10; 325 MG/1; MG/1
1 TABLET ORAL EVERY 8 HOURS PRN
Qty: 90 TABLET | Refills: 0 | Status: SHIPPED | OUTPATIENT
Start: 2020-03-03 | End: 2020-03-31

## 2020-03-03 NOTE — TELEPHONE ENCOUNTER
Norco  mg 1 tab q8 prn filled 1/31/20 90 with 0 refills     LOV 12/16/19  No upcoming apt on file   Labs 7/9/19

## 2020-03-03 NOTE — TELEPHONE ENCOUNTER
Pt notified that Rx has been printed and placed at the  for  - pt expressed understanding.     Rx placed in controlled substance tickler under letter \"H\"

## 2020-03-04 ENCOUNTER — TELEPHONE (OUTPATIENT)
Dept: INTERNAL MEDICINE CLINIC | Facility: CLINIC | Age: 60
End: 2020-03-04

## 2020-03-04 NOTE — TELEPHONE ENCOUNTER
Patient called stating that pharmacy faxed over prior authorization request for medication:HYDROcodone-acetaminophen ([address])  MG Oral Tab. Patient switched insurance and needs authorization.  Chart updated with new insurance

## 2020-03-30 DIAGNOSIS — G89.29 OTHER CHRONIC PAIN: ICD-10-CM

## 2020-03-30 NOTE — TELEPHONE ENCOUNTER
Patient called requesting refill for:  HYDROcodone-acetaminophen [facility] AND [facility])  MG Oral Tab    Please send to: [facility] [address], [address] [place], [phone], [phone]

## 2020-03-31 RX ORDER — HYDROCODONE BITARTRATE AND ACETAMINOPHEN 10; 325 MG/1; MG/1
1 TABLET ORAL EVERY 8 HOURS PRN
Qty: 90 TABLET | Refills: 0 | Status: SHIPPED | OUTPATIENT
Start: 2020-03-31 | End: 2020-05-04

## 2020-04-27 ENCOUNTER — TELEPHONE (OUTPATIENT)
Dept: UROLOGY | Facility: HOSPITAL | Age: 60
End: 2020-04-27

## 2020-04-27 RX ORDER — LIDOCAINE 50 MG/G
PATCH TOPICAL
Qty: 30 PATCH | Refills: 0 | Status: SHIPPED | OUTPATIENT
Start: 2020-04-27

## 2020-04-27 NOTE — TELEPHONE ENCOUNTER
Called to get more information. Patient left message stating would like a stronger medication. Currently taking 5 mg of oxybutynin q day (prescribed 2/11/2020). It is not working for her. Unable to start PFPT.

## 2020-04-28 RX ORDER — OXYBUTYNIN CHLORIDE 10 MG/1
10 TABLET, EXTENDED RELEASE ORAL DAILY
Qty: 30 TABLET | Refills: 2 | Status: SHIPPED | OUTPATIENT
Start: 2020-04-28

## 2020-04-28 NOTE — TELEPHONE ENCOUNTER
Pt called back, denies sx of dry mouth or constipation. Has not noticed any improvement to urge incontinence with Oxybutynin 5 mg. Reviewed new orders, verified pharmacy. Pt to call in 30 days with update, she verbalizes understanding.

## 2020-04-28 NOTE — TELEPHONE ENCOUNTER
Spoke to Dr. Moon Hahn with pt call and c/o Oxybutynin not working for her. TORB increase to Oxybutynin ER 10mg QD, #30, 2 refills, review side effects with pt, pt to call with update in 30 days.  Called pt, [facility] for return call to assess for side effects and noti

## 2020-05-04 DIAGNOSIS — G89.29 OTHER CHRONIC PAIN: ICD-10-CM

## 2020-05-04 RX ORDER — HYDROCODONE BITARTRATE AND ACETAMINOPHEN 10; 325 MG/1; MG/1
1 TABLET ORAL EVERY 8 HOURS PRN
Qty: 90 TABLET | Refills: 0 | Status: SHIPPED | OUTPATIENT
Start: 2020-05-04 | End: 2020-06-04

## 2020-05-07 ENCOUNTER — TELEPHONE (OUTPATIENT)
Dept: INTERNAL MEDICINE CLINIC | Facility: CLINIC | Age: 60
End: 2020-05-07

## 2020-05-07 DIAGNOSIS — K13.0 LIP ULCER: Primary | ICD-10-CM

## 2020-05-07 RX ORDER — PENCICLOVIR 10 MG/G
CREAM TOPICAL
Qty: 5 G | Refills: 0 | Status: SHIPPED | OUTPATIENT
Start: 2020-05-07 | End: 2020-10-26

## 2020-05-12 ENCOUNTER — LABORATORY ENCOUNTER (OUTPATIENT)
Dept: LAB | Age: 60
End: 2020-05-12
Attending: OTOLARYNGOLOGY
Payer: COMMERCIAL

## 2020-05-12 DIAGNOSIS — K14.0 GLOSSITIS: Primary | ICD-10-CM

## 2020-05-12 PROCEDURE — 85652 RBC SED RATE AUTOMATED: CPT

## 2020-05-12 PROCEDURE — 36415 COLL VENOUS BLD VENIPUNCTURE: CPT

## 2020-05-12 PROCEDURE — 80053 COMPREHEN METABOLIC PANEL: CPT

## 2020-05-12 PROCEDURE — 86140 C-REACTIVE PROTEIN: CPT

## 2020-05-12 PROCEDURE — 86038 ANTINUCLEAR ANTIBODIES: CPT

## 2020-05-12 PROCEDURE — 85025 COMPLETE CBC W/AUTO DIFF WBC: CPT

## 2020-06-01 DIAGNOSIS — G89.29 OTHER CHRONIC PAIN: ICD-10-CM

## 2020-06-01 NOTE — TELEPHONE ENCOUNTER
Patient calling for refill on [facility] #[id number] Jeanette Camp, [address] [place], [phone], [phone]

## 2020-06-02 NOTE — TELEPHONE ENCOUNTER
Refill requested:   Requested Prescriptions     Pending Prescriptions Disp Refills   • HYDROcodone-acetaminophen (NORCO)  MG Oral Tab 90 tablet 0     Sig: Take 1 tablet by mouth every 8 (eight) hours as needed for Pain.        Non protocol medication

## 2020-06-04 RX ORDER — HYDROCODONE BITARTRATE AND ACETAMINOPHEN 10; 325 MG/1; MG/1
1 TABLET ORAL EVERY 8 HOURS PRN
Qty: 90 TABLET | Refills: 0 | Status: SHIPPED | OUTPATIENT
Start: 2020-06-04 | End: 2020-07-02

## 2020-07-02 DIAGNOSIS — G89.29 OTHER CHRONIC PAIN: ICD-10-CM

## 2020-07-02 RX ORDER — HYDROCODONE BITARTRATE AND ACETAMINOPHEN 10; 325 MG/1; MG/1
1 TABLET ORAL EVERY 8 HOURS PRN
Qty: 90 TABLET | Refills: 0 | Status: SHIPPED | OUTPATIENT
Start: 2020-07-02 | End: 2020-08-04

## 2020-07-02 NOTE — TELEPHONE ENCOUNTER
Patient calling in requesting a refill for HYDROcodone-acetaminophen [facility])  MG Oral Tab    Pharmacy:  [place] [address] #[id number] [facility], [address] [place] [address] [place], [phone], [phone]

## 2020-07-02 NOTE — TELEPHONE ENCOUNTER
Norco  Last OV relevant to medication: 12-3-19  Last refill date: 6-4-20 #/refills: 0  When pt was asked to return for OV: none  Upcoming appt/reason: none  Recent labs: none

## 2020-08-03 DIAGNOSIS — G89.29 OTHER CHRONIC PAIN: ICD-10-CM

## 2020-08-04 RX ORDER — HYDROCODONE BITARTRATE AND ACETAMINOPHEN 10; 325 MG/1; MG/1
1 TABLET ORAL EVERY 8 HOURS PRN
Qty: 90 TABLET | Refills: 0 | Status: SHIPPED | OUTPATIENT
Start: 2020-08-04 | End: 2020-09-03

## 2020-08-04 NOTE — TELEPHONE ENCOUNTER
No protocol    Requesting HYDROcodone-acetaminophen (NORCO)  MG Oral Tab  LOV: 1/14/20  RTC: NA  Last Relevant Labs: 5/12/20  Filled: 7/2/20 #90 with 0 refills    No future appointments.

## 2020-09-02 DIAGNOSIS — G89.29 OTHER CHRONIC PAIN: ICD-10-CM

## 2020-09-02 NOTE — TELEPHONE ENCOUNTER
Patient calling in requesting a refill for RX HYDROcodone-acetaminophen [facility] AND [facility])  MG Oral Tab    Pharmacy:  [facility] #[id number] [address] [place], [phone], [phone]

## 2020-09-03 RX ORDER — HYDROCODONE BITARTRATE AND ACETAMINOPHEN 10; 325 MG/1; MG/1
1 TABLET ORAL EVERY 8 HOURS PRN
Qty: 90 TABLET | Refills: 0 | Status: SHIPPED | OUTPATIENT
Start: 2020-09-03 | End: 2020-10-06

## 2020-09-03 NOTE — TELEPHONE ENCOUNTER
Norco  Last OV relevant to medication: 1-14-20  Last refill date: 8-4-20 #/refills: 0  When pt was asked to return for OV: none  Upcoming appt/reason: none  Recent labs: none

## 2020-10-02 DIAGNOSIS — G89.29 OTHER CHRONIC PAIN: ICD-10-CM

## 2020-10-02 NOTE — TELEPHONE ENCOUNTER
HYDROcodone-acetaminophen [facility])  MG   [facility] DRUG STORE #[id number] Sheridan Oseguera, [address] [place], [phone], [phone]

## 2020-10-05 NOTE — TELEPHONE ENCOUNTER
Pt is calling to check on the status of her refill. Please call and advise.    Status: Signed : Alejo Winston      HYDROcodone-acetaminophen (NORCO)  MG   [place] [address] #[id number] - [place], [place] - [address] AT [address] [place] [address]

## 2020-10-06 RX ORDER — HYDROCODONE BITARTRATE AND ACETAMINOPHEN 10; 325 MG/1; MG/1
1 TABLET ORAL EVERY 8 HOURS PRN
Qty: 90 TABLET | Refills: 0 | Status: SHIPPED | OUTPATIENT
Start: 2020-10-06 | End: 2020-11-10

## 2020-10-06 NOTE — TELEPHONE ENCOUNTER
No protocol     Requesting HYDROcodone-acetaminophen (NORCO)  MG Oral Tab  LOV: 1/14/20  RTC: NA  Last Relevant Labs: 5/12/20  Filled: 7/2/20 #90 with 0 refills    Future Appointments   Date Time Provider Christelle Engel   10/24/2020  9:00 AM Mot

## 2020-10-17 NOTE — TELEPHONE ENCOUNTER
Passed protocol    Requesting Metoprolol Succinate ER 50 MG Oral Tablet 24 Hr  LOV: 8/27/20  RTC: 6 weeks  Last Relevant Labs: 5/12/20  Filled: Historical entry on 12/29/19    Future Appointments   Date Time Provider Christelle Engel   10/24/2020  9:00 AM

## 2020-10-18 RX ORDER — METOPROLOL SUCCINATE 50 MG/1
TABLET, EXTENDED RELEASE ORAL
Qty: 90 TABLET | Refills: 0 | Status: SHIPPED | OUTPATIENT
Start: 2020-10-18 | End: 2021-07-07

## 2020-10-19 ENCOUNTER — TELEPHONE (OUTPATIENT)
Dept: INTERNAL MEDICINE CLINIC | Facility: CLINIC | Age: 60
End: 2020-10-19

## 2020-10-19 DIAGNOSIS — Z12.31 ENCOUNTER FOR SCREENING MAMMOGRAM FOR MALIGNANT NEOPLASM OF BREAST: ICD-10-CM

## 2020-10-19 DIAGNOSIS — Z00.00 BLOOD TESTS FOR ROUTINE GENERAL PHYSICAL EXAMINATION: Primary | ICD-10-CM

## 2020-10-19 DIAGNOSIS — I10 ESSENTIAL HYPERTENSION: ICD-10-CM

## 2020-10-19 NOTE — TELEPHONE ENCOUNTER
Patient is requesting an order for a mammogram and annual blood work. Patient has an upcoming appointment on 10/26.

## 2020-10-20 ENCOUNTER — LAB ENCOUNTER (OUTPATIENT)
Dept: LAB | Age: 60
End: 2020-10-20
Attending: INTERNAL MEDICINE
Payer: COMMERCIAL

## 2020-10-20 DIAGNOSIS — I10 ESSENTIAL HYPERTENSION: ICD-10-CM

## 2020-10-20 DIAGNOSIS — Z00.00 BLOOD TESTS FOR ROUTINE GENERAL PHYSICAL EXAMINATION: ICD-10-CM

## 2020-10-20 PROCEDURE — 80061 LIPID PANEL: CPT

## 2020-10-20 PROCEDURE — 81001 URINALYSIS AUTO W/SCOPE: CPT

## 2020-10-20 PROCEDURE — 84436 ASSAY OF TOTAL THYROXINE: CPT

## 2020-10-20 PROCEDURE — 36415 COLL VENOUS BLD VENIPUNCTURE: CPT

## 2020-10-20 PROCEDURE — 84443 ASSAY THYROID STIM HORMONE: CPT

## 2020-10-20 PROCEDURE — 82306 VITAMIN D 25 HYDROXY: CPT

## 2020-10-20 PROCEDURE — 80053 COMPREHEN METABOLIC PANEL: CPT

## 2020-10-20 PROCEDURE — 83036 HEMOGLOBIN GLYCOSYLATED A1C: CPT

## 2020-10-20 PROCEDURE — 85025 COMPLETE CBC W/AUTO DIFF WBC: CPT

## 2020-10-26 ENCOUNTER — OFFICE VISIT (OUTPATIENT)
Dept: INTERNAL MEDICINE CLINIC | Facility: CLINIC | Age: 60
End: 2020-10-26
Payer: COMMERCIAL

## 2020-10-26 ENCOUNTER — LAB ENCOUNTER (OUTPATIENT)
Dept: LAB | Age: 60
End: 2020-10-26
Attending: INTERNAL MEDICINE
Payer: COMMERCIAL

## 2020-10-26 VITALS
TEMPERATURE: 98 F | OXYGEN SATURATION: 99 % | DIASTOLIC BLOOD PRESSURE: 80 MMHG | BODY MASS INDEX: 28.29 KG/M2 | HEIGHT: 65 IN | HEART RATE: 89 BPM | SYSTOLIC BLOOD PRESSURE: 126 MMHG | WEIGHT: 169.81 LBS

## 2020-10-26 DIAGNOSIS — E87.5 HYPERKALEMIA: ICD-10-CM

## 2020-10-26 DIAGNOSIS — E78.00 PURE HYPERCHOLESTEROLEMIA: ICD-10-CM

## 2020-10-26 DIAGNOSIS — R10.31 RIGHT LOWER QUADRANT ABDOMINAL PAIN: ICD-10-CM

## 2020-10-26 DIAGNOSIS — M85.88 OSTEOPENIA OF SPINE: ICD-10-CM

## 2020-10-26 DIAGNOSIS — I10 ESSENTIAL HYPERTENSION: ICD-10-CM

## 2020-10-26 DIAGNOSIS — R10.31 RIGHT LOWER QUADRANT ABDOMINAL PAIN: Primary | ICD-10-CM

## 2020-10-26 PROCEDURE — 99214 OFFICE O/P EST MOD 30 MIN: CPT | Performed by: INTERNAL MEDICINE

## 2020-10-26 PROCEDURE — 80048 BASIC METABOLIC PNL TOTAL CA: CPT

## 2020-10-26 PROCEDURE — 3074F SYST BP LT 130 MM HG: CPT | Performed by: INTERNAL MEDICINE

## 2020-10-26 PROCEDURE — 90686 IIV4 VACC NO PRSV 0.5 ML IM: CPT | Performed by: INTERNAL MEDICINE

## 2020-10-26 PROCEDURE — 85025 COMPLETE CBC W/AUTO DIFF WBC: CPT

## 2020-10-26 PROCEDURE — 90471 IMMUNIZATION ADMIN: CPT | Performed by: INTERNAL MEDICINE

## 2020-10-26 PROCEDURE — 3079F DIAST BP 80-89 MM HG: CPT | Performed by: INTERNAL MEDICINE

## 2020-10-26 PROCEDURE — 81001 URINALYSIS AUTO W/SCOPE: CPT

## 2020-10-26 PROCEDURE — 36415 COLL VENOUS BLD VENIPUNCTURE: CPT

## 2020-10-26 PROCEDURE — 3008F BODY MASS INDEX DOCD: CPT | Performed by: INTERNAL MEDICINE

## 2020-10-26 PROCEDURE — 83690 ASSAY OF LIPASE: CPT

## 2020-10-26 RX ORDER — PENCICLOVIR 10 MG/G
1 CREAM TOPICAL EVERY 2 HOUR PRN
Qty: 5 G | Refills: 0 | Status: SHIPPED | OUTPATIENT
Start: 2020-10-26

## 2020-10-26 RX ORDER — FUROSEMIDE 40 MG/1
40 TABLET ORAL DAILY
COMMUNITY
Start: 2020-10-26 | End: 2021-01-07

## 2020-10-26 RX ORDER — ATORVASTATIN CALCIUM 20 MG/1
20 TABLET, FILM COATED ORAL NIGHTLY
Qty: 90 TABLET | Refills: 3 | Status: SHIPPED | OUTPATIENT
Start: 2020-10-26 | End: 2021-10-26

## 2020-10-26 NOTE — PROGRESS NOTES
Curtis Brady  1960 is a 61year old female.     Patient presents with:  Hypertension    Patient essentially here for a blood pressure check at the lab however states she is having some pain in the abdomen  HPI:   Abdominal pain:   The abdominal pain b MG Oral Tab Take by mouth. • Dicyclomine HCl 10 MG Oral Cap Take 1 capsule (10 mg total) by mouth 3 (three) times daily as needed. 90 capsule 1   • Fluticasone Propionate 50 MCG/ACT Nasal Suspension 2 sprays by Each Nare route daily.  1 Bottle 0   • Nal Used    Alcohol use: No      Alcohol/week: 0.0 standard drinks    Drug use: No       REVIEW OF SYSTEMS:       ROS:   Gastrointestinal:   Reflux no. Jairo Conejos Appetite change no. Black stools no. Bloating no. Blood in stool no. Change in bowel habits no.  Constipatio LIPASE; Future    Essential hypertension    Pure hypercholesterolemia  -     atorvastatin 20 MG Oral Tab; Take 1 tablet (20 mg total) by mouth nightly. Hyperkalemia  -     BASIC METABOLIC PANEL (8);  Future    Osteopenia of spine  -     XR DEXA BONE DEN

## 2020-10-26 NOTE — PATIENT INSTRUCTIONS
Call if symptoms signs worsen or go to the emergency room  Further recommendations when lab work and CT is available

## 2020-10-27 ENCOUNTER — HOSPITAL ENCOUNTER (OUTPATIENT)
Dept: MAMMOGRAPHY | Age: 60
Discharge: HOME | End: 2020-10-27
Attending: INTERNAL MEDICINE
Payer: COMMERCIAL

## 2020-10-27 DIAGNOSIS — Z12.31 ENCOUNTER FOR SCREENING MAMMOGRAM FOR MALIGNANT NEOPLASM OF BREAST: ICD-10-CM

## 2020-10-27 PROCEDURE — 77063 BREAST TOMOSYNTHESIS BI: CPT | Performed by: INTERNAL MEDICINE

## 2020-10-27 PROCEDURE — 77067 SCR MAMMO BI INCL CAD: CPT | Performed by: INTERNAL MEDICINE

## 2020-10-29 ENCOUNTER — HOSPITAL ENCOUNTER (OUTPATIENT)
Dept: CT IMAGING | Age: 60
Discharge: HOME | End: 2020-10-29
Attending: INTERNAL MEDICINE
Payer: COMMERCIAL

## 2020-10-29 ENCOUNTER — HOSPITAL ENCOUNTER (OUTPATIENT)
Dept: MAMMOGRAPHY | Facility: HOSPITAL | Age: 60
Discharge: HOME | End: 2020-10-29
Attending: INTERNAL MEDICINE
Payer: COMMERCIAL

## 2020-10-29 DIAGNOSIS — R92.2 INCONCLUSIVE MAMMOGRAM: ICD-10-CM

## 2020-10-29 DIAGNOSIS — R10.31 RIGHT LOWER QUADRANT ABDOMINAL PAIN: ICD-10-CM

## 2020-10-29 PROCEDURE — 76642 ULTRASOUND BREAST LIMITED: CPT | Performed by: INTERNAL MEDICINE

## 2020-10-29 PROCEDURE — 77065 DX MAMMO INCL CAD UNI: CPT | Performed by: INTERNAL MEDICINE

## 2020-10-29 PROCEDURE — 77061 BREAST TOMOSYNTHESIS UNI: CPT | Performed by: INTERNAL MEDICINE

## 2020-10-29 PROCEDURE — 74177 CT ABD & PELVIS W/CONTRAST: CPT | Performed by: INTERNAL MEDICINE

## 2020-11-09 DIAGNOSIS — G89.29 OTHER CHRONIC PAIN: ICD-10-CM

## 2020-11-09 NOTE — TELEPHONE ENCOUNTER
Patient is requesting a refill on her prescription for HYDROcodone-acetaminophen (NORCO)  MG Oral Tab.  Needs to be sent to [address], [address] [place], [phone], [phone]

## 2020-11-10 ENCOUNTER — HOSPITAL ENCOUNTER (OUTPATIENT)
Dept: BONE DENSITY | Age: 60
Discharge: HOME | End: 2020-11-10
Attending: INTERNAL MEDICINE
Payer: COMMERCIAL

## 2020-11-10 DIAGNOSIS — M85.88 OSTEOPENIA OF SPINE: ICD-10-CM

## 2020-11-10 PROCEDURE — 77080 DXA BONE DENSITY AXIAL: CPT | Performed by: INTERNAL MEDICINE

## 2020-11-10 RX ORDER — HYDROCODONE BITARTRATE AND ACETAMINOPHEN 10; 325 MG/1; MG/1
1 TABLET ORAL EVERY 8 HOURS PRN
Qty: 90 TABLET | Refills: 0 | Status: SHIPPED | OUTPATIENT
Start: 2020-11-10 | End: 2020-12-08

## 2020-11-10 NOTE — TELEPHONE ENCOUNTER
Requesting HYDROcodone-acetaminophen (NORCO)  MG Oral Tab  LOV: 10/26/20  RTC: 4 weeks  Last Relevant Labs: 10/26/20  Filled: 10/6/20 #90 with 0 refills    Future Appointments   Date Time Provider Christelle Engel   11/23/2020 11:00 AM Zeke

## 2020-11-23 ENCOUNTER — OFFICE VISIT (OUTPATIENT)
Dept: INTERNAL MEDICINE CLINIC | Facility: CLINIC | Age: 60
End: 2020-11-23
Payer: COMMERCIAL

## 2020-11-23 VITALS
WEIGHT: 172.38 LBS | OXYGEN SATURATION: 97 % | BODY MASS INDEX: 27.7 KG/M2 | TEMPERATURE: 98 F | RESPIRATION RATE: 16 BRPM | SYSTOLIC BLOOD PRESSURE: 136 MMHG | HEIGHT: 66 IN | DIASTOLIC BLOOD PRESSURE: 84 MMHG | HEART RATE: 61 BPM

## 2020-11-23 DIAGNOSIS — M85.88 OSTEOPENIA OF SPINE: Chronic | ICD-10-CM

## 2020-11-23 DIAGNOSIS — I10 ESSENTIAL HYPERTENSION: Chronic | ICD-10-CM

## 2020-11-23 DIAGNOSIS — E78.00 PURE HYPERCHOLESTEROLEMIA: Chronic | ICD-10-CM

## 2020-11-23 DIAGNOSIS — Z00.00 ROUTINE GENERAL MEDICAL EXAMINATION AT A HEALTH CARE FACILITY: Primary | ICD-10-CM

## 2020-11-23 PROCEDURE — 99396 PREV VISIT EST AGE 40-64: CPT | Performed by: INTERNAL MEDICINE

## 2020-11-23 PROCEDURE — 3008F BODY MASS INDEX DOCD: CPT | Performed by: INTERNAL MEDICINE

## 2020-11-23 PROCEDURE — 3075F SYST BP GE 130 - 139MM HG: CPT | Performed by: INTERNAL MEDICINE

## 2020-11-23 PROCEDURE — 3079F DIAST BP 80-89 MM HG: CPT | Performed by: INTERNAL MEDICINE

## 2020-11-23 RX ORDER — POTASSIUM CHLORIDE 20 MEQ/1
20 TABLET, EXTENDED RELEASE ORAL
Qty: 90 TABLET | Refills: 2 | COMMUNITY
Start: 2020-11-23 | End: 2021-01-07

## 2020-11-23 RX ORDER — CALCIUM CARBONATE 500(1250)
500 TABLET ORAL 2 TIMES DAILY
Qty: 180 TABLET | Refills: 3 | Status: SHIPPED | OUTPATIENT
Start: 2020-11-23 | End: 2021-11-23

## 2020-11-23 RX ORDER — CHOLECALCIFEROL (VITAMIN D3) 50 MCG
1 TABLET ORAL DAILY
Qty: 90 TABLET | Refills: 3 | Status: SHIPPED | OUTPATIENT
Start: 2020-11-23 | End: 2021-02-21

## 2020-11-23 NOTE — PATIENT INSTRUCTIONS
Patient just started her statins as well as a potassium.   Will repeat blood work in 3 months  All labs discussed with patient

## 2020-11-23 NOTE — PROGRESS NOTES
Stephany Wright [facility] 1960 is a 61year old female.     Patient presents with:  Physical      HPI:     See below   Current Outpatient Medications   Medication Sig Dispense Refill   • Cholecalciferol (VITAMIN D) 50 MCG (2000) Oral Tab Take 1 tablet by mouth dose in other nostril 2-5 minutes after first dose. (Patient not taking: Reported on 2/11/2020 ) 1 kit 0   • Fluticasone Propionate 50 MCG/ACT Nasal Suspension 2 sprays by Each Nare route daily.  1 Bottle 0        Gabapentin              SWELLING    Comment fever, no weakness, no weight loss or gain . HEENT/Neck:   Head stable headache, no dizziness, no lightheadedness. Eyes normal vision, no redness, no drainage. Ears no earaches, no fullness, normal hearing, no tinnitus.  Nose and Sinuses no colds, no disc loss of consciousness, weakness. Memory loss none. Tingling/numbness none. Trouble with balance none.    Psychiatric: none       EXAM:   /84   Pulse 61   Temp 98.3 °F (36.8 °C) (Oral)   Resp 16   Ht 66\"   Wt 172 lb 6.4 oz (78.2 kg)   SpO2 97%   BMI 2 -none/involuntary movements -none. Reflexes: normal.   Sensory: normal sensation to all modalities. LYMPHATICS:   Groin: no adenopathy . Inguinal: no adenopathy. Supraclavicular: none.    DERMATOLOGY:   Rash: no.       ASSESSMENT AND PLAN:   Radha booker

## 2020-12-07 DIAGNOSIS — G89.29 OTHER CHRONIC PAIN: ICD-10-CM

## 2020-12-07 NOTE — TELEPHONE ENCOUNTER
HYDROcodone-acetaminophen (NORCO)  MG Oral Tab   Please refill per patient request   [facility] #[id number] [place], [address] [place], [phone], [phone]

## 2020-12-08 RX ORDER — HYDROCODONE BITARTRATE AND ACETAMINOPHEN 10; 325 MG/1; MG/1
1 TABLET ORAL EVERY 8 HOURS PRN
Qty: 90 TABLET | Refills: 0 | Status: SHIPPED | OUTPATIENT
Start: 2020-12-08 | End: 2021-01-05

## 2020-12-08 NOTE — PLAN OF CARE
PAIN - ADULT    • Verbalizes/displays adequate comfort level or patient's stated pain goal Not Progressing          DISCHARGE PLANNING    • Discharge to home or other facility with appropriate resources Progressing        RISK FOR INFECTION - ADULT    • Ab IV discontinued, cath removed intact

## 2020-12-08 NOTE — TELEPHONE ENCOUNTER
Requesting HYDROcodone-acetaminophen (NORCO)  MG Oral Tab  LOV: 11/23/20  RTC: 6 months  Last Relevant Labs: 10/26/20  Filled: 11/10/20 #90 with 0 refills    No future appointments.

## 2021-01-05 DIAGNOSIS — G89.29 OTHER CHRONIC PAIN: ICD-10-CM

## 2021-01-05 RX ORDER — HYDROCODONE BITARTRATE AND ACETAMINOPHEN 10; 325 MG/1; MG/1
1 TABLET ORAL EVERY 8 HOURS PRN
Qty: 90 TABLET | Refills: 0 | Status: SHIPPED | OUTPATIENT
Start: 2021-01-05 | End: 2021-02-10

## 2021-01-05 NOTE — TELEPHONE ENCOUNTER
Patient requesting refill on rx Norco  mg. Last OV:11/23/20  No protocol medication   Last time medication was refilled:12/8/20#90 with no refill   Medication routed to PCP for refill if appropriate. No future appointments.

## 2021-01-06 DIAGNOSIS — I10 ESSENTIAL HYPERTENSION: Chronic | ICD-10-CM

## 2021-01-07 RX ORDER — POTASSIUM CHLORIDE 20 MEQ/1
TABLET, EXTENDED RELEASE ORAL
Qty: 90 TABLET | Refills: 2 | Status: SHIPPED | OUTPATIENT
Start: 2021-01-07

## 2021-01-07 RX ORDER — FUROSEMIDE 40 MG/1
TABLET ORAL
Qty: 90 TABLET | Refills: 0 | Status: SHIPPED | OUTPATIENT
Start: 2021-01-07

## 2021-01-07 NOTE — TELEPHONE ENCOUNTER
Potassium Chloride  ER 20MEQ last filled 11/23/20 #90 with 2 refills     Furosemide 40 mg added to historical on 10/26/20    LOV  11/23/20     RTC - 3 months     Last labs 10/20/20

## 2021-02-08 DIAGNOSIS — G89.29 OTHER CHRONIC PAIN: ICD-10-CM

## 2021-02-08 NOTE — TELEPHONE ENCOUNTER
Patient called requesting refill for HYDROcodone-acetaminophen (NORCO)  MG    Last OV:11/23/20  NO PROTOCOL MEDICATION   Last time medication was refilled:1/5/21#90 WITH NO REFILLS   Medication routed to PCP for refill if appropriate.    No future felicia

## 2021-02-10 RX ORDER — HYDROCODONE BITARTRATE AND ACETAMINOPHEN 10; 325 MG/1; MG/1
1 TABLET ORAL EVERY 8 HOURS PRN
Qty: 90 TABLET | Refills: 0 | Status: SHIPPED | OUTPATIENT
Start: 2021-02-10 | End: 2021-03-12

## 2021-03-08 ENCOUNTER — TELEPHONE (OUTPATIENT)
Dept: INTERNAL MEDICINE CLINIC | Facility: CLINIC | Age: 61
End: 2021-03-08

## 2021-03-08 NOTE — TELEPHONE ENCOUNTER
HAYLEY paperwork received for patient. Did you want patient to make in person OV to complete paperwork?        LAST OV: 11/23/2020--PHYSICAL    Paperwork in  basket

## 2021-03-09 NOTE — TELEPHONE ENCOUNTER
Appt scheduled    Future Appointments   Date Time Provider Christelle Maren   3/12/2021  3:00 PM Analia Beverly MD [facility] [place]

## 2021-03-10 DIAGNOSIS — G89.29 OTHER CHRONIC PAIN: ICD-10-CM

## 2021-03-10 NOTE — TELEPHONE ENCOUNTER
HYDROcodone-acetaminophen (NORCO)  MG Oral Tab 90 tablet 0 2/10/2021    Sig:   Take 1 tablet by mouth every 8 (eight) hours as needed for Pain.      Route:   Oral     PRN Reason(s):   Pain     Earliest Fill Date:   2/10/2021       [facility] STORE

## 2021-03-11 NOTE — TELEPHONE ENCOUNTER
Medication(s) to Refill:   Requested Prescriptions     Pending Prescriptions Disp Refills   • HYDROcodone-acetaminophen (NORCO)  MG Oral Tab 90 tablet 0     Sig: Take 1 tablet by mouth every 8 (eight) hours as needed for Pain.        LOV: 11-

## 2021-03-12 RX ORDER — HYDROCODONE BITARTRATE AND ACETAMINOPHEN 10; 325 MG/1; MG/1
1 TABLET ORAL EVERY 8 HOURS PRN
Qty: 90 TABLET | Refills: 0 | Status: SHIPPED | OUTPATIENT
Start: 2021-03-12 | End: 2021-04-09

## 2021-03-16 NOTE — TELEPHONE ENCOUNTER
FMLA forms faxed back to [facility] source at [phone] - copies made for scan and pod FMLA folder.

## 2021-04-08 DIAGNOSIS — G89.29 OTHER CHRONIC PAIN: ICD-10-CM

## 2021-04-08 NOTE — TELEPHONE ENCOUNTER
Patient is requesting a refill on her prescription for HYDROcodone-acetaminophen (NORCO)  MG Oral Tab.   [facility] DRUG STORE #[id number] [facility], [address] [place], [phone], [phone]

## 2021-04-08 NOTE — TELEPHONE ENCOUNTER
Medication(s) to Refill:   Requested Prescriptions     Pending Prescriptions Disp Refills   • HYDROcodone-acetaminophen (NORCO)  MG Oral Tab 90 tablet 0     Sig: Take 1 tablet by mouth every 8 (eight) hours as needed for Pain.        LOV:  11/23/2020

## 2021-04-09 RX ORDER — HYDROCODONE BITARTRATE AND ACETAMINOPHEN 10; 325 MG/1; MG/1
1 TABLET ORAL EVERY 8 HOURS PRN
Qty: 90 TABLET | Refills: 0 | Status: SHIPPED | OUTPATIENT
Start: 2021-04-09 | End: 2021-05-11

## 2021-05-07 DIAGNOSIS — G89.29 OTHER CHRONIC PAIN: ICD-10-CM

## 2021-05-07 NOTE — TELEPHONE ENCOUNTER
Patient requesting a refill on prescription.        HYDROcodone-acetaminophen [facility])  MG Oral Tab    [facility] DRUG STORE #[id number] [place], [address] AT [place] [address] [place], [phone], [phone]

## 2021-05-10 NOTE — TELEPHONE ENCOUNTER
Requesting HYDROcodone-acetaminophen (NORCO)  MG Oral Tab  LOV: 11/23/20  RTC: 6 months  Last Relevant Labs: 10/26/20  Filled: 4/9/21 #90 with 0 refills    No future appointments.

## 2021-05-11 RX ORDER — HYDROCODONE BITARTRATE AND ACETAMINOPHEN 10; 325 MG/1; MG/1
1 TABLET ORAL EVERY 8 HOURS PRN
Qty: 90 TABLET | Refills: 0 | Status: SHIPPED | OUTPATIENT
Start: 2021-05-11 | End: 2021-06-10

## 2021-05-26 DIAGNOSIS — I10 ESSENTIAL HYPERTENSION: Chronic | ICD-10-CM

## 2021-05-27 RX ORDER — METOPROLOL SUCCINATE 100 MG/1
TABLET, EXTENDED RELEASE ORAL
Qty: 30 TABLET | Refills: 0 | Status: SHIPPED | OUTPATIENT
Start: 2021-05-27 | End: 2021-10-06

## 2021-05-27 NOTE — TELEPHONE ENCOUNTER
Medication(s) to Refill:   Requested Prescriptions     Pending Prescriptions Disp Refills   • METOPROLOL SUCCINATE  MG Oral Tablet 24 Hr [Pharmacy Med Name: METOPROLOL ER SUCCINATE 100MG TABS] 90 tablet 0     Sig: TAKE 1 TABLET BY MOUTH DAILY       L

## 2021-06-09 DIAGNOSIS — G89.29 OTHER CHRONIC PAIN: ICD-10-CM

## 2021-06-09 NOTE — TELEPHONE ENCOUNTER
Please advise, patient will like to know why Metoprolol Succinate ER 50 dose was increased to 100 MG

## 2021-06-09 NOTE — TELEPHONE ENCOUNTER
Patient requesting refill.      HYDROcodone-acetaminophen [facility])  MG Oral Tab    [facility] DRUG STORE #[id number] - [address] AT [address], [phone], [phone]      Patient wants to know why her medication

## 2021-06-10 RX ORDER — HYDROCODONE BITARTRATE AND ACETAMINOPHEN 10; 325 MG/1; MG/1
1 TABLET ORAL EVERY 8 HOURS PRN
Qty: 90 TABLET | Refills: 0 | Status: SHIPPED | OUTPATIENT
Start: 2021-06-10 | End: 2021-07-08

## 2021-07-07 DIAGNOSIS — G89.29 OTHER CHRONIC PAIN: ICD-10-CM

## 2021-07-07 NOTE — TELEPHONE ENCOUNTER
Patient is leaving for out of town tomorrow and she is out of her prescriptions for:     HYDROcodone-acetaminophen (NORCO)  MG Oral Tab     METOPROLOL SUCCINATE ER 50 MG Oral Tablet 24 Hr for a 90 day supply       [place] [address] #[id number] Alexa Laboy

## 2021-07-07 NOTE — TELEPHONE ENCOUNTER
Medication(s) to Refill:   Requested Prescriptions     Pending Prescriptions Disp Refills   • HYDROcodone-acetaminophen (NORCO)  MG Oral Tab 90 tablet 0     Sig: Take 1 tablet by mouth every 8 (eight) hours as needed for Pain.    • metoprolol succinat

## 2021-07-08 RX ORDER — METOPROLOL SUCCINATE 50 MG/1
50 TABLET, EXTENDED RELEASE ORAL DAILY
Qty: 90 TABLET | Refills: 0 | Status: SHIPPED | OUTPATIENT
Start: 2021-07-08 | End: 2021-10-07

## 2021-07-08 RX ORDER — HYDROCODONE BITARTRATE AND ACETAMINOPHEN 10; 325 MG/1; MG/1
1 TABLET ORAL EVERY 8 HOURS PRN
Qty: 90 TABLET | Refills: 0 | Status: SHIPPED | OUTPATIENT
Start: 2021-07-08 | End: 2021-08-13

## 2021-08-10 DIAGNOSIS — G89.29 OTHER CHRONIC PAIN: ICD-10-CM

## 2021-08-10 NOTE — TELEPHONE ENCOUNTER
Patient requesting refill.      HYDROcodone-acetaminophen [facility] AND [facility])  MG Oral Tab    [facility] DRUG STORE #[id number] [facility], [address] AT [place] [address] [place], [phone], [phone]

## 2021-08-13 RX ORDER — HYDROCODONE BITARTRATE AND ACETAMINOPHEN 10; 325 MG/1; MG/1
1 TABLET ORAL EVERY 8 HOURS PRN
Qty: 90 TABLET | Refills: 0 | Status: SHIPPED | OUTPATIENT
Start: 2021-08-13 | End: 2021-09-09

## 2021-09-08 DIAGNOSIS — G89.29 OTHER CHRONIC PAIN: ICD-10-CM

## 2021-09-08 NOTE — TELEPHONE ENCOUNTER
rx refill request     HYDROcodone-acetaminophen [facility] AND [facility])  MG Oral Tab    [facility] STORE #[id number] [facility] [place] - [address] AT [address] [place] [address] [place], [phone], [phone]

## 2021-09-09 RX ORDER — HYDROCODONE BITARTRATE AND ACETAMINOPHEN 10; 325 MG/1; MG/1
1 TABLET ORAL EVERY 8 HOURS PRN
Qty: 90 TABLET | Refills: 0 | Status: SHIPPED | OUTPATIENT
Start: 2021-09-09 | End: 2021-10-08

## 2021-10-06 ENCOUNTER — TELEPHONE (OUTPATIENT)
Dept: INTERNAL MEDICINE CLINIC | Facility: CLINIC | Age: 61
End: 2021-10-06

## 2021-10-06 DIAGNOSIS — G89.29 OTHER CHRONIC PAIN: ICD-10-CM

## 2021-10-06 DIAGNOSIS — I10 ESSENTIAL HYPERTENSION: Chronic | ICD-10-CM

## 2021-10-06 NOTE — TELEPHONE ENCOUNTER
Patient called to ask for refill prescription on the following.      HYDROcodone-acetaminophen (NORCO)  MG Oral Tab  Pantoprazole Sodium 40 MG Oral Tab EC  metoprolol succinate 50 MG Oral Tablet 24 Hr  METOPROLOL SUCCINATE  MG Oral Tablet 24 Hr

## 2021-10-07 NOTE — TELEPHONE ENCOUNTER
Protocol failed    Metoprolol Succinate 50mg   Metoprolol succinate 100mg     No protocol  Pantoprazole sodium 40mg   Norco 10-325mg       LOV: 11/23/20   RTC: 6 months   Recent Labs:10/20/20     Upcoming OV: none scheduled

## 2021-10-08 RX ORDER — METOPROLOL SUCCINATE 100 MG/1
100 TABLET, EXTENDED RELEASE ORAL DAILY
Qty: 30 TABLET | Refills: 0 | Status: SHIPPED | OUTPATIENT
Start: 2021-10-08 | End: 2021-10-09

## 2021-10-08 RX ORDER — PANTOPRAZOLE SODIUM 40 MG/1
TABLET, DELAYED RELEASE ORAL
Qty: 30 TABLET | Refills: 0 | Status: SHIPPED | OUTPATIENT
Start: 2021-10-08 | End: 2021-12-30

## 2021-10-08 RX ORDER — METOPROLOL SUCCINATE 50 MG/1
50 TABLET, EXTENDED RELEASE ORAL DAILY
Qty: 30 TABLET | Refills: 0 | Status: SHIPPED | OUTPATIENT
Start: 2021-10-08

## 2021-10-08 RX ORDER — HYDROCODONE BITARTRATE AND ACETAMINOPHEN 10; 325 MG/1; MG/1
1 TABLET ORAL EVERY 8 HOURS PRN
Qty: 90 TABLET | Refills: 0 | Status: SHIPPED | OUTPATIENT
Start: 2021-10-08 | End: 2021-10-22

## 2021-10-08 NOTE — TELEPHONE ENCOUNTER
Pharmacy called and is requesting clarification on metoprolol succinate 50 MG Oral Tablet 24 Hr and metoprolol succinate 100 MG Oral Tablet 24 Hr. Please advise.

## 2021-10-08 NOTE — TELEPHONE ENCOUNTER
Patient needs to set up a complete physical.  Please place all orders. Further refills will be denied unless she gets a physical done.

## 2021-10-08 NOTE — TELEPHONE ENCOUNTER
Your Appointments      4:30 PM  Physical - Established with Johnnie Aquino MD  [address] [facility] [address] [facility] [address] [facility]  [phone]

## 2021-10-09 NOTE — TELEPHONE ENCOUNTER
Last note states that she is on metoprolol 50 mg that should be the current dose.   You may want to verify with the patient

## 2021-10-09 NOTE — TELEPHONE ENCOUNTER
Recent medication list had both 50 mg and 100 mg listed. Please clarify if patient is supposed to be on Metoprolol 50 mg or Metoprolol 100 mg or both?

## 2021-10-21 ENCOUNTER — OFFICE VISIT (OUTPATIENT)
Dept: INTERNAL MEDICINE CLINIC | Facility: CLINIC | Age: 61
End: 2021-10-21
Payer: COMMERCIAL

## 2021-10-21 VITALS
DIASTOLIC BLOOD PRESSURE: 72 MMHG | RESPIRATION RATE: 18 BRPM | HEIGHT: 65.5 IN | SYSTOLIC BLOOD PRESSURE: 120 MMHG | HEART RATE: 58 BPM | BODY MASS INDEX: 29.17 KG/M2 | WEIGHT: 177.19 LBS | TEMPERATURE: 98 F | OXYGEN SATURATION: 98 %

## 2021-10-21 DIAGNOSIS — Z12.31 ENCOUNTER FOR SCREENING MAMMOGRAM FOR MALIGNANT NEOPLASM OF BREAST: Primary | ICD-10-CM

## 2021-10-21 DIAGNOSIS — G89.29 OTHER CHRONIC PAIN: ICD-10-CM

## 2021-10-21 PROCEDURE — 3008F BODY MASS INDEX DOCD: CPT | Performed by: INTERNAL MEDICINE

## 2021-10-21 PROCEDURE — 3074F SYST BP LT 130 MM HG: CPT | Performed by: INTERNAL MEDICINE

## 2021-10-21 PROCEDURE — 3078F DIAST BP <80 MM HG: CPT | Performed by: INTERNAL MEDICINE

## 2021-10-21 RX ORDER — VALACYCLOVIR HYDROCHLORIDE 1 G/1
1 TABLET, FILM COATED ORAL DAILY
Qty: 21 TABLET | Refills: 0 | Status: CANCELLED | OUTPATIENT
Start: 2021-10-21

## 2021-10-21 RX ORDER — VALACYCLOVIR HYDROCHLORIDE 1 G/1
1 TABLET, FILM COATED ORAL DAILY
Qty: 21 TABLET | Refills: 0 | Status: SHIPPED | OUTPATIENT
Start: 2021-10-21 | End: 2021-11-23

## 2021-10-22 NOTE — TELEPHONE ENCOUNTER
Your Appointments    2021  4:30 PM  Physical - Established with Elvina Bence, MD  [address] [facility], [facility] ([facility]) [address] [place], [place] [address] [address] [facility]  [phone]

## 2021-10-22 NOTE — TELEPHONE ENCOUNTER
Pt called stating she was supposed to receive a 180 tablet supply and she was given 21 tabs.  Please advise

## 2021-10-23 RX ORDER — HYDROCODONE BITARTRATE AND ACETAMINOPHEN 10; 325 MG/1; MG/1
1 TABLET ORAL EVERY 8 HOURS PRN
Qty: 180 TABLET | Refills: 0 | Status: SHIPPED | OUTPATIENT
Start: 2021-10-23 | End: 2021-12-02

## 2021-11-23 ENCOUNTER — OFFICE VISIT (OUTPATIENT)
Dept: INTERNAL MEDICINE CLINIC | Facility: CLINIC | Age: 61
End: 2021-11-23
Payer: COMMERCIAL

## 2021-11-23 VITALS
SYSTOLIC BLOOD PRESSURE: 150 MMHG | TEMPERATURE: 98 F | DIASTOLIC BLOOD PRESSURE: 96 MMHG | BODY MASS INDEX: 28.66 KG/M2 | HEART RATE: 86 BPM | WEIGHT: 172 LBS | RESPIRATION RATE: 16 BRPM | OXYGEN SATURATION: 98 % | HEIGHT: 65 IN

## 2021-11-23 DIAGNOSIS — Z00.00 ROUTINE GENERAL MEDICAL EXAMINATION AT A HEALTH CARE FACILITY: Primary | ICD-10-CM

## 2021-11-23 DIAGNOSIS — G62.9 NEUROPATHY: ICD-10-CM

## 2021-11-23 DIAGNOSIS — E78.00 PURE HYPERCHOLESTEROLEMIA: ICD-10-CM

## 2021-11-23 PROCEDURE — 93000 ELECTROCARDIOGRAM COMPLETE: CPT | Performed by: INTERNAL MEDICINE

## 2021-11-23 PROCEDURE — 90471 IMMUNIZATION ADMIN: CPT | Performed by: INTERNAL MEDICINE

## 2021-11-23 PROCEDURE — 90686 IIV4 VACC NO PRSV 0.5 ML IM: CPT | Performed by: INTERNAL MEDICINE

## 2021-11-23 PROCEDURE — 3080F DIAST BP >= 90 MM HG: CPT | Performed by: INTERNAL MEDICINE

## 2021-11-23 PROCEDURE — 99396 PREV VISIT EST AGE 40-64: CPT | Performed by: INTERNAL MEDICINE

## 2021-11-23 PROCEDURE — 99213 OFFICE O/P EST LOW 20 MIN: CPT | Performed by: INTERNAL MEDICINE

## 2021-11-23 PROCEDURE — 3008F BODY MASS INDEX DOCD: CPT | Performed by: INTERNAL MEDICINE

## 2021-11-23 PROCEDURE — 3077F SYST BP >= 140 MM HG: CPT | Performed by: INTERNAL MEDICINE

## 2021-11-23 RX ORDER — ONDANSETRON 4 MG/1
4 TABLET, FILM COATED ORAL EVERY 8 HOURS PRN
Qty: 20 TABLET | Refills: 2 | Status: SHIPPED | OUTPATIENT
Start: 2021-11-23

## 2021-11-23 RX ORDER — VALACYCLOVIR HYDROCHLORIDE 1 G/1
1000 TABLET, FILM COATED ORAL DAILY
Qty: 180 TABLET | Refills: 0 | Status: SHIPPED | OUTPATIENT
Start: 2021-11-23 | End: 2021-12-21

## 2021-11-23 RX ORDER — VALACYCLOVIR HYDROCHLORIDE 1 G/1
1000 TABLET, FILM COATED ORAL DAILY
Qty: 21 TABLET | Refills: 0 | Status: CANCELLED | OUTPATIENT
Start: 2021-11-23

## 2021-11-23 NOTE — PROGRESS NOTES
Drew Sexton [facility] 1960 is a 64year old female.     Patient presents with:  Physical      HPI:     See below   Current Outpatient Medications   Medication Sig Dispense Refill   • ondansetron (ZOFRAN) 4 mg tablet Take 1 tablet (4 mg total) by mouth every 8 Comment:Edema  Other                       Comment:IVP dye -unknown reation   Past Medical History:   Diagnosis Date   • Arthritis    • Back pain    • Carpal tunnel syndrome 6/20/2013    Log Date: 11/28/2012    • Cervical stenosis of spine    • Degeneratio hay fever, no sinus trouble. Mouth and Pharynx no sore throats, no hoarseness. Neck no lumps, no goiter, no neck stiffness or pain. Endocrine:   Diabetes none. Thyroid disorder none.    Respiratory:   Patient denies  No new  chest pain-but has chronic lef °C) (Oral)   Resp 16   Ht 5' 5\" (1.651 m)   Wt 172 lb (78 kg)   SpO2 98%   BMI 28.62 kg/m²   GENERAL:   Build: normal .   General Appearance: alert and oriented, pleasant.    HEENT:   Ear canals: normal.   EOM: within normal limit-conjunctiva normal.   Hea modalities. Except diminished sensation for feet partial  LYMPHATICS:   Groin: no adenopathy . Inguinal: no adenopathy. Supraclavicular: none.    DERMATOLOGY:   Rash: no.       ASSESSMENT AND PLAN:   Lucía Garcia was seen today for physical.    Diagnoses and

## 2021-11-27 ENCOUNTER — LAB ENCOUNTER (OUTPATIENT)
Dept: LAB | Age: 61
End: 2021-11-27
Attending: INTERNAL MEDICINE
Payer: COMMERCIAL

## 2021-11-27 DIAGNOSIS — Z00.00 ROUTINE GENERAL MEDICAL EXAMINATION AT A HEALTH CARE FACILITY: ICD-10-CM

## 2021-11-27 DIAGNOSIS — G62.9 NEUROPATHY: ICD-10-CM

## 2021-11-27 DIAGNOSIS — I10 ESSENTIAL HYPERTENSION: ICD-10-CM

## 2021-11-27 PROCEDURE — 81001 URINALYSIS AUTO W/SCOPE: CPT

## 2021-11-27 PROCEDURE — 82746 ASSAY OF FOLIC ACID SERUM: CPT

## 2021-11-27 PROCEDURE — 80053 COMPREHEN METABOLIC PANEL: CPT

## 2021-11-27 PROCEDURE — 80061 LIPID PANEL: CPT

## 2021-11-27 PROCEDURE — 82607 VITAMIN B-12: CPT

## 2021-11-27 PROCEDURE — 82306 VITAMIN D 25 HYDROXY: CPT

## 2021-11-27 PROCEDURE — 83036 HEMOGLOBIN GLYCOSYLATED A1C: CPT

## 2021-11-27 PROCEDURE — 36415 COLL VENOUS BLD VENIPUNCTURE: CPT

## 2021-11-27 PROCEDURE — 84436 ASSAY OF TOTAL THYROXINE: CPT

## 2021-11-27 PROCEDURE — 84443 ASSAY THYROID STIM HORMONE: CPT

## 2021-11-27 PROCEDURE — 85025 COMPLETE CBC W/AUTO DIFF WBC: CPT

## 2021-11-30 DIAGNOSIS — G89.29 OTHER CHRONIC PAIN: ICD-10-CM

## 2021-11-30 NOTE — TELEPHONE ENCOUNTER
Pt requesting refill    HYDROcodone-acetaminophen [facility] AND [facility])  MG Oral Tab    [facility] DRUG STORE #[id number] - [address] AT [address], [phone], [phone]    Last filled: 10/23/21 180 tabs 0 refills

## 2021-12-01 RX ORDER — HYDROCODONE BITARTRATE AND ACETAMINOPHEN 10; 325 MG/1; MG/1
1 TABLET ORAL EVERY 8 HOURS PRN
Qty: 180 TABLET | Refills: 0 | OUTPATIENT
Start: 2021-12-01

## 2021-12-02 RX ORDER — HYDROCODONE BITARTRATE AND ACETAMINOPHEN 10; 325 MG/1; MG/1
1 TABLET ORAL EVERY 8 HOURS PRN
Qty: 180 TABLET | Refills: 0 | Status: SHIPPED | OUTPATIENT
Start: 2021-12-02 | End: 2022-01-03

## 2021-12-07 ENCOUNTER — HOSPITAL ENCOUNTER (OUTPATIENT)
Dept: MAMMOGRAPHY | Age: 61
Discharge: HOME | End: 2021-12-07
Attending: INTERNAL MEDICINE
Payer: COMMERCIAL

## 2021-12-07 DIAGNOSIS — Z12.31 ENCOUNTER FOR SCREENING MAMMOGRAM FOR MALIGNANT NEOPLASM OF BREAST: ICD-10-CM

## 2021-12-07 PROCEDURE — 77067 SCR MAMMO BI INCL CAD: CPT | Performed by: INTERNAL MEDICINE

## 2021-12-07 PROCEDURE — 77063 BREAST TOMOSYNTHESIS BI: CPT | Performed by: INTERNAL MEDICINE

## 2021-12-20 ENCOUNTER — TELEPHONE (OUTPATIENT)
Dept: INTERNAL MEDICINE CLINIC | Facility: CLINIC | Age: 61
End: 2021-12-20

## 2021-12-20 DIAGNOSIS — R73.09 ELEVATED HEMOGLOBIN A1C: Primary | ICD-10-CM

## 2021-12-20 NOTE — TELEPHONE ENCOUNTER
----- Message from Hipolito Fernández MD sent at 11/30/2021 11:03 AM CST -----  Reviewed results all blood is within acceptable limits however the hemoglobin A1c is drifting a little bit higher.   Would recommend low-carb low-fat diet a little weight loss and e

## 2021-12-21 ENCOUNTER — HOSPITAL ENCOUNTER (OUTPATIENT)
Dept: MAMMOGRAPHY | Facility: HOSPITAL | Age: 61
Discharge: HOME | End: 2021-12-21
Attending: INTERNAL MEDICINE
Payer: COMMERCIAL

## 2021-12-21 ENCOUNTER — TELEPHONE (OUTPATIENT)
Dept: INTERNAL MEDICINE CLINIC | Facility: CLINIC | Age: 61
End: 2021-12-21

## 2021-12-21 DIAGNOSIS — R92.2 INCONCLUSIVE MAMMOGRAM: ICD-10-CM

## 2021-12-21 PROCEDURE — 77065 DX MAMMO INCL CAD UNI: CPT | Performed by: INTERNAL MEDICINE

## 2021-12-21 PROCEDURE — 76642 ULTRASOUND BREAST LIMITED: CPT | Performed by: INTERNAL MEDICINE

## 2021-12-21 PROCEDURE — 77061 BREAST TOMOSYNTHESIS UNI: CPT | Performed by: INTERNAL MEDICINE

## 2021-12-21 RX ORDER — VALACYCLOVIR HYDROCHLORIDE 1 G/1
1000 TABLET, FILM COATED ORAL 2 TIMES DAILY
Qty: 180 TABLET | Refills: 0 | Status: SHIPPED | OUTPATIENT
Start: 2021-12-21

## 2021-12-21 NOTE — TELEPHONE ENCOUNTER
Incoming fax from [facility]   Patient states that she takes 1 tablet BID   Sig updated

## 2021-12-27 DIAGNOSIS — G89.29 OTHER CHRONIC PAIN: ICD-10-CM

## 2021-12-27 NOTE — TELEPHONE ENCOUNTER
Pt called requesting refill for HYDROcodone-acetaminophen ([facility])  MG Oral Tab    Pt states she is requesting it a few days early, but she states she wanted to request it before VM goes on vacation.      Denise  #[id number] [place] [facility] [place] -

## 2021-12-30 DIAGNOSIS — I10 ESSENTIAL HYPERTENSION: Chronic | ICD-10-CM

## 2022-01-03 RX ORDER — PANTOPRAZOLE SODIUM 40 MG/1
TABLET, DELAYED RELEASE ORAL
Qty: 90 TABLET | Refills: 0 | OUTPATIENT
Start: 2022-01-03

## 2022-01-03 RX ORDER — HYDROCODONE BITARTRATE AND ACETAMINOPHEN 10; 325 MG/1; MG/1
1 TABLET ORAL EVERY 8 HOURS PRN
Qty: 180 TABLET | Refills: 0 | Status: SHIPPED | OUTPATIENT
Start: 2022-01-03 | End: 2022-02-03

## 2022-01-03 RX ORDER — METOPROLOL SUCCINATE 100 MG/1
TABLET, EXTENDED RELEASE ORAL
Qty: 90 TABLET | Refills: 0 | OUTPATIENT
Start: 2022-01-03

## 2022-01-06 ENCOUNTER — TELEPHONE (OUTPATIENT)
Dept: INTERNAL MEDICINE CLINIC | Facility: CLINIC | Age: 62
End: 2022-01-06

## 2022-01-12 ENCOUNTER — TELEMEDICINE (OUTPATIENT)
Dept: INTERNAL MEDICINE CLINIC | Facility: CLINIC | Age: 62
End: 2022-01-12

## 2022-01-12 DIAGNOSIS — G62.9 NEUROPATHY: Primary | ICD-10-CM

## 2022-01-12 PROCEDURE — 99213 OFFICE O/P EST LOW 20 MIN: CPT | Performed by: PHYSICIAN ASSISTANT

## 2022-01-12 NOTE — PROGRESS NOTES
Virtual Video Check-In     This visit is conducted using Telemedicine with live, interactive video and audio. Audrey Sanchez, who has verified his/her identification by name and , verbally consents to a Virtual/Telephone Check-In visit on 22.   Pa metoprolol succinate 50 MG Oral Tablet 24 Hr Take 1 tablet (50 mg total) by mouth daily.  30 tablet 0   • FUROSEMIDE 40 MG Oral Tab TAKE 1 TABLET(40 MG) BY MOUTH DAILY 90 tablet 0   • POTASSIUM CHLORIDE ER 20 MEQ Oral Tab CR TAKE 1 TABLET(20 MEQ) BY MOUTH E 4/5/2017   • Trigger finger, left middle finger 4/5/2017   • Trigger finger, right index finger 4/5/2017   • Trigger finger, right ring finger 4/5/2017   • Varicose vein    • Ventral hernia 2002   • Visual impairment     glasses   • Wears glasses       Pas GID & Marimar Flatten NDL/CATH SPI DX/THER NJX  11/8/2013    Procedure: COCCYX;  Surgeon: Yareli Tolbert MD;  Location: [address] [place] [address] FLUOR GID & Marimar Flatten NDL/CATH SPI DX/THER NJX N/A 11/21/2014    Procedure: STELLATE GANGLION INJECTION;  Kristyn Harris Procedure: COCCYX;  Surgeon: Wil Das MD;  Location: [address]   • INJ TENDON/LIGAMENT/CYST  10/18/2013    Procedure: COCCYX;  Surgeon: Wil Das MD;  Location: [address]   • INJ TENDON/LIGAMENT/CYST 10/18/2013    Procedure: TRANSFORAMINAL EPIDURAL - LUMBAR;  Surgeon: Mark Betancur MD;  Location: [address] [place] [address]   • INJECTION, W/WO CONTRAST, DX/THERAPEUTIC SUBSTANCE, EPIDURAL/SUBARACHNOID; LUMBAR/SACRAL  11/8/2013    Procedure: Jackson Zaidi Procedure: COCCYX;  Surgeon: Kiersten Colin MD;  Location: [facility]   • M-SEDAJ BY GARY [facility] [address] [facility] 59  N [facility] 5+ YR  3/1/2013    Procedure: COCCYX;  Surgeon: Kiersten Colin MD;  Location: [facility]   • M-SEDAJ BY GARY [address] Location[place] [facility]   • M-SEDAJ BY  PHYS [id number] [facility] 59  N [facility] 5+ YR N/A 11/30/2015    Procedure: CAUDAL;  Surgeon: Clarissa Padilla MD;  Location: [address] [place] [address]   • M-SEDAJ BY  PHYS [id number] [facility] 59  N [place] 5+ YR N/A 12/22/2015    Proced paperwork completed today. The patient indicates understanding of these issues and agrees to the plan. The patient is asked to return here in 1 month.     *Please note that the following visit was completed using two-way, real-time interactive audio and

## 2022-01-12 NOTE — TELEPHONE ENCOUNTER
Faxed completed fmla paperwork to [facility] source   Confirmation received   Placed in fmla accordion in pod#2 and sent to scan   Copy placed at  for patient to 
Form completed, in my out basket. Please fax and leave copy for pt to  at .
Form placed in LL in-basket
HAYLEY paperwork received from [facility] source   Per covering provider SV patient needs a appointment either in person or video   PSR to call and schedule with SV   Form placed in SV in-basket
Spoke with patient to schedule. Pt preffered to see Americo Andres. Scheduled VV.      Future Appointments   Date Time Provider Christelle Engel   1/12/2022 11:00 AM LULA Conner EMG 8 EMG [place]
No

## 2022-01-20 ENCOUNTER — TELEPHONE (OUTPATIENT)
Dept: INTERNAL MEDICINE CLINIC | Facility: CLINIC | Age: 62
End: 2022-01-20

## 2022-01-20 NOTE — TELEPHONE ENCOUNTER
Pt is requesting referral for bilateral foot numbness and tingling. Per pt she spoke with Екатерина Bolaños regarding the issue during 1/12. I advised pt to call us back with a name of a Dr that will take her insurance and pt verbalized understanding.  I advised that

## 2022-02-01 NOTE — TELEPHONE ENCOUNTER
Pt requesting refill     HYDROcodone-acetaminophen [facility] AND [facility])  MG Oral Tab    [facility] DR[facility] STORE #[id number] [place], [place] - [address] AT [place] [address] [place]aron [address]ZUNA, [phone], [phone]

## 2022-02-02 NOTE — TELEPHONE ENCOUNTER
No Protocol     Requesting: hydrocodone 10-325mg     LOV: 11/23/21   RTC: 4 weeks   Filled: 1/3/22 #180 0 refills   Recent Labs: 11/27/21     Upcoming OV: none scheduled

## 2022-02-03 RX ORDER — HYDROCODONE BITARTRATE AND ACETAMINOPHEN 10; 325 MG/1; MG/1
1 TABLET ORAL EVERY 8 HOURS PRN
Qty: 180 TABLET | Refills: 0 | Status: SHIPPED | OUTPATIENT
Start: 2022-02-03 | End: 2022-03-05

## 2022-02-22 ENCOUNTER — TELEPHONE (OUTPATIENT)
Dept: INTERNAL MEDICINE CLINIC | Facility: CLINIC | Age: 62
End: 2022-02-22

## 2022-02-22 NOTE — TELEPHONE ENCOUNTER
Form received and placed in  in-basket for when he returns   Patient aware that VM will return on 3/3

## 2022-02-22 NOTE — TELEPHONE ENCOUNTER
Pt calling to request that Dr. Sariah Tejeda fill out forms to renew her medical marijuana card. Form titled \"[id number] Written Certification Form for [facility]\" placed in fax folder along with previously filled out form from 01/2019.

## 2022-03-04 NOTE — TELEPHONE ENCOUNTER
Pt requesting refill -     HYDROcodone-acetaminophen [facility])  MG Oral Tab    [facility] DRUG STORE #[id number] [place], [place] - [address] AT [address] [place], [phone], [phone]

## 2022-03-05 RX ORDER — HYDROCODONE BITARTRATE AND ACETAMINOPHEN 10; 325 MG/1; MG/1
1 TABLET ORAL EVERY 8 HOURS PRN
Qty: 180 TABLET | Refills: 0 | Status: SHIPPED | OUTPATIENT
Start: 2022-03-05

## 2022-03-08 ENCOUNTER — VIRTUAL PHONE E/M (OUTPATIENT)
Dept: INTERNAL MEDICINE CLINIC | Facility: CLINIC | Age: 62
End: 2022-03-08
Payer: COMMERCIAL

## 2022-03-08 DIAGNOSIS — G56.40 COMPLEX REGIONAL PAIN SYNDROME TYPE 2, AFFECTING UNSPECIFIED SITE: Primary | ICD-10-CM

## 2022-03-08 PROCEDURE — 99442 PHONE E/M BY PHYS 11-20 MIN: CPT | Performed by: INTERNAL MEDICINE

## 2022-03-08 NOTE — TELEPHONE ENCOUNTER
Pt stated that she was given a 90 day supply because she has told us in the past the insurance will not cover 180 tablets. For future reference when she requests next refill, she only picked up a 90 day supply.

## 2022-03-08 NOTE — PROGRESS NOTES
Virtual Telephone Check-In    Aretha Saravia verbally consents to a Virtual/Telephone Check-In visit on 03/08/22. Patient has been referred to the [facility] website at [email or web address] to review the yearly Consent to Treat document. Patient understands and accepts financial responsibility for any deductible, co-insurance and/or co-pays associated with this service. Duration of the service: 13 minutes      Summary of topics discussed: Patient has been certified for cannabis in April 2019 in view of for causalgia involving the left chest.  Patient requesting a filling out of the form for renewal of a cannabis card.   All records were reviewed and concur with continuation of current treatment      Diagnoses and all orders for this visit:    Complex regional pain syndrome type 2, affecting unspecified site      Form filled out    Jeanette Olguin MD

## 2022-03-11 RX ORDER — METOPROLOL SUCCINATE 50 MG/1
TABLET, EXTENDED RELEASE ORAL
Qty: 30 TABLET | Refills: 0 | Status: SHIPPED | OUTPATIENT
Start: 2022-03-11

## 2022-03-29 RX ORDER — ATORVASTATIN CALCIUM 20 MG/1
TABLET, FILM COATED ORAL
Qty: 90 TABLET | Refills: 3 | Status: SHIPPED | OUTPATIENT
Start: 2022-03-29

## 2022-04-06 RX ORDER — HYDROCODONE BITARTRATE AND ACETAMINOPHEN 10; 325 MG/1; MG/1
1 TABLET ORAL EVERY 8 HOURS PRN
Qty: 180 TABLET | Refills: 0 | Status: SHIPPED | OUTPATIENT
Start: 2022-04-06

## 2022-04-13 ENCOUNTER — OFFICE VISIT (OUTPATIENT)
Dept: ELECTROPHYSIOLOGY | Facility: HOSPITAL | Age: 62
End: 2022-04-13
Attending: INTERNAL MEDICINE
Payer: COMMERCIAL

## 2022-04-13 DIAGNOSIS — G62.9 NEUROPATHY: ICD-10-CM

## 2022-04-13 PROCEDURE — 95886 MUSC TEST DONE W/N TEST COMP: CPT

## 2022-04-13 PROCEDURE — 95909 NRV CNDJ TST 5-6 STUDIES: CPT

## 2022-04-14 NOTE — PROCEDURES
[facility], [address] [place] [address]      PATIENT'S NAME: Concetta Turpin   REFERRING PHYSICIAN: Maury Paulino M.D. PATIENT ACCOUNT #: [de-identified] LOCATION: EMG   [facility]   MEDICAL RECORD #: [id number] YOB: 1960   DATE OF EXAM: 04/13/2022       ELECTRONEUROMYOGRAPHIC REPORT    CHIEF COMPLAINT:  This patient presented with tingling, numbness, burning sensation in both feet, as well  as chronic low back pain. Patient had a spinal stimulator in the middle of the lower back on the right side. EMG of both lower extremities was requested to rule out neuropathy versus lumbar radiculopathy. INTERPRETATION:  Bilateral sural sensory responses were absent. Bilateral common peroneal and tibial motor responses, as well as F-wave latencies, were normal.  Peroneal F-wave was not clear. Needle examination of right quadriceps, tibialis anterior, and gastrocnemius muscles was unremarkable. Needle examination of bilateral mid and lower lumbar paraspinal muscles was attempted; however, much muscle spasming, potential artifact from the spinal stimulator was also seen and made it difficult to see any denervation change. From limited attempt, I do not see any obvious neurogenic denervation change though. IMPRESSION:  This is an abnormal study. 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 Superimposed mild or chronic lumbar radiculopathy cannot be entirely ruled out given the poor relaxation in the paraspinal muscle exam as well as spinal stimulator artifact. Clinical correlation is indicated.     Dictated By Moshe Carroll M.D.  d:   04/13/2022 15:18:52  t:   04/13/2022 19:51:06  [facility]  [id number]  /

## 2022-04-22 ENCOUNTER — TELEPHONE (OUTPATIENT)
Dept: INTERNAL MEDICINE CLINIC | Facility: CLINIC | Age: 62
End: 2022-04-22

## 2022-04-22 NOTE — TELEPHONE ENCOUNTER
Pt requesting refill for the following medications. METOPROLOL SUCCINATE ER 50 MG Oral Tablet 24 Hr. POTASSIUM CHLORIDE ER 20 MEQ Oral Tab CR. FUROSEMIDE 40 MG Oral Tab.       [facility] DRUG STORE #[id number] [place] , [address], [phone], [address] [place] [address] [address]   Phone: [phone] Fax: [phone]

## 2022-04-22 NOTE — TELEPHONE ENCOUNTER
Pt would like for a nurse to give her a call back for test results EMG scan from 4/14/22. Please advise.

## 2022-04-25 RX ORDER — METOPROLOL SUCCINATE 50 MG/1
50 TABLET, EXTENDED RELEASE ORAL DAILY
Qty: 30 TABLET | Refills: 3 | Status: SHIPPED | OUTPATIENT
Start: 2022-04-25

## 2022-04-25 RX ORDER — FUROSEMIDE 40 MG/1
40 TABLET ORAL DAILY
Qty: 90 TABLET | Refills: 0 | Status: SHIPPED | OUTPATIENT
Start: 2022-04-25

## 2022-04-25 RX ORDER — METOPROLOL SUCCINATE 50 MG/1
50 TABLET, EXTENDED RELEASE ORAL DAILY
Qty: 30 TABLET | Refills: 0 | Status: SHIPPED | OUTPATIENT
Start: 2022-04-25

## 2022-04-25 RX ORDER — POTASSIUM CHLORIDE 20 MEQ/1
20 TABLET, EXTENDED RELEASE ORAL DAILY
Qty: 90 TABLET | Refills: 2 | Status: SHIPPED | OUTPATIENT
Start: 2022-04-25

## 2022-04-25 RX ORDER — POTASSIUM CHLORIDE 1500 MG/1
20 TABLET, FILM COATED, EXTENDED RELEASE ORAL DAILY
Qty: 30 TABLET | Refills: 0 | Status: SHIPPED | OUTPATIENT
Start: 2022-04-25 | End: 2022-05-25

## 2022-04-25 RX ORDER — FUROSEMIDE 40 MG/1
40 TABLET ORAL DAILY
Qty: 90 TABLET | Refills: 3 | Status: SHIPPED | OUTPATIENT
Start: 2022-04-25 | End: 2023-04-20

## 2022-04-25 NOTE — TELEPHONE ENCOUNTER
Protocol passed   Furosemide 40mg filled 1/7/21 #90 0 refills   Metoprolol succinate ER 50mg filled 3/11/22 #30 0 refills     No Protocol   Potassium chloride 20meq filled 1/7/21 #90 2 refills     LOV:1/12/22 with LL   RTC: none noted   Recent Labs: 11/27/21     Upcoming OV: none scheduled

## 2022-05-05 NOTE — TELEPHONE ENCOUNTER
Pt requesting refill    HYDROcodone-acetaminophen [facility] AND [facility])  MG Oral Tab    [facility] DRUG STORE #[id number] [place], [place] - [address] AT [place] [address] [place] [address], [phone], [phone]

## 2022-05-05 NOTE — TELEPHONE ENCOUNTER
LOV: 3/8/2022 with Dr. Blayne Bucio  RTC: no follow-up on file  Last Relevant Labs: 11/27/2021  Filled: 4/6/2022    #180 with 0 refills to [address] [facility],[address] [facility] Appointments   Date Time Provider Christelle Engel   5/16/2022 11:40 AM Cheng Peacock MD ENINAPER EMG [place]

## 2022-05-10 RX ORDER — HYDROCODONE BITARTRATE AND ACETAMINOPHEN 10; 325 MG/1; MG/1
1 TABLET ORAL EVERY 8 HOURS PRN
Qty: 180 TABLET | Refills: 0 | Status: SHIPPED | OUTPATIENT
Start: 2022-05-10

## 2022-05-12 ENCOUNTER — TELEPHONE (OUTPATIENT)
Dept: INTERNAL MEDICINE CLINIC | Facility: CLINIC | Age: 62
End: 2022-05-12

## 2022-05-12 ENCOUNTER — OFFICE VISIT (OUTPATIENT)
Dept: INTERNAL MEDICINE CLINIC | Facility: CLINIC | Age: 62
End: 2022-05-12
Payer: COMMERCIAL

## 2022-05-12 VITALS
BODY MASS INDEX: 29.32 KG/M2 | TEMPERATURE: 98 F | RESPIRATION RATE: 18 BRPM | WEIGHT: 176 LBS | DIASTOLIC BLOOD PRESSURE: 80 MMHG | SYSTOLIC BLOOD PRESSURE: 128 MMHG | HEART RATE: 78 BPM | OXYGEN SATURATION: 98 % | HEIGHT: 65 IN

## 2022-05-12 DIAGNOSIS — R20.0 NUMBNESS AND TINGLING OF BOTH LOWER EXTREMITIES: ICD-10-CM

## 2022-05-12 DIAGNOSIS — R20.2 TINGLING OF BOTH UPPER EXTREMITIES: ICD-10-CM

## 2022-05-12 DIAGNOSIS — R20.2 NUMBNESS AND TINGLING OF BOTH LOWER EXTREMITIES: ICD-10-CM

## 2022-05-12 DIAGNOSIS — M54.2 NECK PAIN: Primary | ICD-10-CM

## 2022-05-12 PROCEDURE — 99214 OFFICE O/P EST MOD 30 MIN: CPT | Performed by: INTERNAL MEDICINE

## 2022-05-12 PROCEDURE — 3074F SYST BP LT 130 MM HG: CPT | Performed by: INTERNAL MEDICINE

## 2022-05-12 PROCEDURE — 3079F DIAST BP 80-89 MM HG: CPT | Performed by: INTERNAL MEDICINE

## 2022-05-12 PROCEDURE — 3008F BODY MASS INDEX DOCD: CPT | Performed by: INTERNAL MEDICINE

## 2022-05-12 RX ORDER — SODIUM FLUORIDE 6 MG/ML
1 PASTE, DENTIFRICE DENTAL AS DIRECTED
COMMUNITY
Start: 2021-12-22

## 2022-05-12 RX ORDER — CHLORHEXIDINE GLUCONATE 0.12 MG/ML
RINSE ORAL
COMMUNITY
Start: 2021-12-22

## 2022-05-12 NOTE — PATIENT INSTRUCTIONS
EMG nerve conduction of the lower extremity was reviewed with the patient she already has an appointment with Dr. Vickie Irwin today for follow-up may need an MRI of the lumbar spine however the patient does have a pain stimulator in place  May need some alternate form of imaging

## 2022-05-13 NOTE — TELEPHONE ENCOUNTER
Form completed   Patient informed   Placed at  for    Copies sent to scan and copy placed in accordion

## 2022-05-16 ENCOUNTER — OFFICE VISIT (OUTPATIENT)
Dept: NEUROLOGY | Facility: CLINIC | Age: 62
End: 2022-05-16
Payer: COMMERCIAL

## 2022-05-16 VITALS
HEART RATE: 72 BPM | DIASTOLIC BLOOD PRESSURE: 70 MMHG | RESPIRATION RATE: 16 BRPM | BODY MASS INDEX: 29 KG/M2 | WEIGHT: 175 LBS | SYSTOLIC BLOOD PRESSURE: 122 MMHG

## 2022-05-16 DIAGNOSIS — R20.0 LEFT ARM NUMBNESS: ICD-10-CM

## 2022-05-16 DIAGNOSIS — G60.8 SENSORY PERIPHERAL NEUROPATHY: ICD-10-CM

## 2022-05-16 DIAGNOSIS — G56.03 BILATERAL CARPAL TUNNEL SYNDROME: ICD-10-CM

## 2022-05-16 PROCEDURE — 99204 OFFICE O/P NEW MOD 45 MIN: CPT | Performed by: OTHER

## 2022-05-16 PROCEDURE — 3074F SYST BP LT 130 MM HG: CPT | Performed by: OTHER

## 2022-05-16 PROCEDURE — 3078F DIAST BP <80 MM HG: CPT | Performed by: OTHER

## 2022-05-16 RX ORDER — NORTRIPTYLINE HYDROCHLORIDE 25 MG/1
25 CAPSULE ORAL NIGHTLY
Qty: 30 CAPSULE | Refills: 2 | Status: SHIPPED | OUTPATIENT
Start: 2022-05-16

## 2022-05-16 NOTE — PROGRESS NOTES
Patient states bilateral BLE and BUE numbness and tingling. Patient states also some burning. Patient states left arm is worse than the right. Patient states this started Summer 2021 for her BLE. Patient states BUE started last month. Patient had an EMG on 04/13/2022. Patient states no changes in headaches.

## 2022-05-17 NOTE — TELEPHONE ENCOUNTER
Called patient and informed her the [facility] paperwork was received, placed them in Dr Araceli Brice in basket for review

## 2022-05-19 NOTE — TELEPHONE ENCOUNTER
Completed FMLA form faxed back to [facility] source   Confirmation received   Sent to scan and copy placed in FMLA accordion in pod #2

## 2022-05-21 ENCOUNTER — LAB ENCOUNTER (OUTPATIENT)
Dept: LAB | Age: 62
End: 2022-05-21
Attending: Other
Payer: COMMERCIAL

## 2022-05-21 ENCOUNTER — HOSPITAL ENCOUNTER (OUTPATIENT)
Dept: CT IMAGING | Age: 62
Discharge: HOME | End: 2022-05-21
Attending: Other
Payer: COMMERCIAL

## 2022-05-21 DIAGNOSIS — R20.0 LEFT ARM NUMBNESS: ICD-10-CM

## 2022-05-21 DIAGNOSIS — G60.8 SENSORY PERIPHERAL NEUROPATHY: ICD-10-CM

## 2022-05-21 DIAGNOSIS — R73.09 ELEVATED HEMOGLOBIN A1C: ICD-10-CM

## 2022-05-21 LAB
EST. AVERAGE GLUCOSE BLD GHB EST-MCNC: 120 MG/DL (ref 68–126)
HBA1C MFR BLD: 5.8 % (ref ?–5.7)

## 2022-05-21 PROCEDURE — 83521 IG LIGHT CHAINS FREE EACH: CPT

## 2022-05-21 PROCEDURE — 86038 ANTINUCLEAR ANTIBODIES: CPT

## 2022-05-21 PROCEDURE — 36415 COLL VENOUS BLD VENIPUNCTURE: CPT

## 2022-05-21 PROCEDURE — 86334 IMMUNOFIX E-PHORESIS SERUM: CPT

## 2022-05-21 PROCEDURE — 84165 PROTEIN E-PHORESIS SERUM: CPT

## 2022-05-21 PROCEDURE — 72125 CT NECK SPINE W/O DYE: CPT | Performed by: OTHER

## 2022-05-21 PROCEDURE — 83036 HEMOGLOBIN GLYCOSYLATED A1C: CPT

## 2022-05-23 LAB
ALBUMIN SERPL ELPH-MCNC: 4.75 G/DL (ref 3.75–5.21)
ALBUMIN/GLOB SERPL: 1.56 {RATIO} (ref 1–2)
ALPHA1 GLOB SERPL ELPH-MCNC: 0.25 G/DL (ref 0.19–0.46)
ALPHA2 GLOB SERPL ELPH-MCNC: 0.73 G/DL (ref 0.48–1.05)
ANA SER QL: NEGATIVE
B-GLOBULIN SERPL ELPH-MCNC: 0.91 G/DL (ref 0.68–1.23)
GAMMA GLOB SERPL ELPH-MCNC: 1.16 G/DL (ref 0.62–1.7)
KAPPA LC FREE SER-MCNC: 3.35 MG/DL (ref 0.33–1.94)
KAPPA LC FREE/LAMBDA FREE SER NEPH: 0.99 {RATIO} (ref 0.26–1.65)
LAMBDA LC FREE SERPL-MCNC: 3.38 MG/DL (ref 0.57–2.63)
PROT SERPL-MCNC: 7.8 G/DL (ref 6.4–8.2)

## 2022-06-01 ENCOUNTER — TELEPHONE (OUTPATIENT)
Dept: NEUROLOGY | Facility: CLINIC | Age: 62
End: 2022-06-01

## 2022-06-01 NOTE — TELEPHONE ENCOUNTER
Called patient to relay CT results message from provider: Moderate arthritis at C5-6 and that can explain the left arm numbness      Patient states her symptoms have become more constant, are worse when sitting. States nothing really helps. Also complains of continued lower back pain. OV scheduled for 7/20/2022. Suggested that patient schedule sooner appointment to discuss treatment options. Call routed to .

## 2022-06-03 ENCOUNTER — HOSPITAL ENCOUNTER (OUTPATIENT)
Age: 62
Discharge: HOME | End: 2022-06-03
Payer: COMMERCIAL

## 2022-06-03 ENCOUNTER — APPOINTMENT (OUTPATIENT)
Dept: GENERAL RADIOLOGY | Age: 62
End: 2022-06-03
Attending: NURSE PRACTITIONER
Payer: COMMERCIAL

## 2022-06-03 VITALS
TEMPERATURE: 98 F | OXYGEN SATURATION: 97 % | HEART RATE: 63 BPM | SYSTOLIC BLOOD PRESSURE: 147 MMHG | DIASTOLIC BLOOD PRESSURE: 75 MMHG | RESPIRATION RATE: 18 BRPM

## 2022-06-03 DIAGNOSIS — G56.01 CARPAL TUNNEL SYNDROME OF RIGHT WRIST: Primary | ICD-10-CM

## 2022-06-03 PROCEDURE — 99213 OFFICE O/P EST LOW 20 MIN: CPT

## 2022-06-03 PROCEDURE — 73110 X-RAY EXAM OF WRIST: CPT | Performed by: NURSE PRACTITIONER

## 2022-06-03 RX ORDER — METHYLPREDNISOLONE 4 MG/1
TABLET ORAL
Qty: 1 EACH | Refills: 0 | Status: SHIPPED | OUTPATIENT
Start: 2022-06-03

## 2022-06-03 NOTE — ED INITIAL ASSESSMENT (HPI)
Pt c/o R wrist pain worsening over the past week, especially with using the keyboard, pt has had carpel tunnel surgery on that wrist saw md scheduled for emg

## 2022-06-06 ENCOUNTER — OFFICE VISIT (OUTPATIENT)
Dept: INTERNAL MEDICINE CLINIC | Facility: CLINIC | Age: 62
End: 2022-06-06
Payer: COMMERCIAL

## 2022-06-06 VITALS
SYSTOLIC BLOOD PRESSURE: 144 MMHG | DIASTOLIC BLOOD PRESSURE: 88 MMHG | RESPIRATION RATE: 16 BRPM | HEART RATE: 68 BPM | BODY MASS INDEX: 29.32 KG/M2 | OXYGEN SATURATION: 96 % | TEMPERATURE: 98 F | WEIGHT: 176 LBS | HEIGHT: 65 IN

## 2022-06-06 DIAGNOSIS — R20.0 NUMBNESS AND TINGLING OF BOTH LOWER EXTREMITIES: ICD-10-CM

## 2022-06-06 DIAGNOSIS — G89.29 OTHER CHRONIC PAIN: ICD-10-CM

## 2022-06-06 DIAGNOSIS — R20.2 NUMBNESS AND TINGLING OF BOTH LOWER EXTREMITIES: ICD-10-CM

## 2022-06-06 DIAGNOSIS — R20.2 TINGLING OF BOTH UPPER EXTREMITIES: Primary | ICD-10-CM

## 2022-06-06 DIAGNOSIS — G56.00 CARPAL TUNNEL SYNDROME, UNSPECIFIED LATERALITY: ICD-10-CM

## 2022-06-06 PROCEDURE — 3077F SYST BP >= 140 MM HG: CPT | Performed by: INTERNAL MEDICINE

## 2022-06-06 PROCEDURE — 99213 OFFICE O/P EST LOW 20 MIN: CPT | Performed by: INTERNAL MEDICINE

## 2022-06-06 PROCEDURE — 3008F BODY MASS INDEX DOCD: CPT | Performed by: INTERNAL MEDICINE

## 2022-06-06 PROCEDURE — 3079F DIAST BP 80-89 MM HG: CPT | Performed by: INTERNAL MEDICINE

## 2022-06-06 RX ORDER — HYDROCODONE BITARTRATE AND ACETAMINOPHEN 10; 325 MG/1; MG/1
1 TABLET ORAL EVERY 8 HOURS PRN
Qty: 180 TABLET | Refills: 0 | Status: SHIPPED | OUTPATIENT
Start: 2022-06-06

## 2022-06-15 ENCOUNTER — OFFICE VISIT (OUTPATIENT)
Dept: NEUROLOGY | Facility: CLINIC | Age: 62
End: 2022-06-15
Payer: COMMERCIAL

## 2022-06-15 VITALS
HEIGHT: 65 IN | BODY MASS INDEX: 29.32 KG/M2 | HEART RATE: 82 BPM | DIASTOLIC BLOOD PRESSURE: 78 MMHG | WEIGHT: 176 LBS | SYSTOLIC BLOOD PRESSURE: 110 MMHG | RESPIRATION RATE: 16 BRPM

## 2022-06-15 DIAGNOSIS — G56.03 BILATERAL CARPAL TUNNEL SYNDROME: ICD-10-CM

## 2022-06-15 DIAGNOSIS — G60.8 SENSORY PERIPHERAL NEUROPATHY: ICD-10-CM

## 2022-06-15 DIAGNOSIS — M50.122 CERVICAL DISC DISORDER AT C5-C6 LEVEL WITH RADICULOPATHY: ICD-10-CM

## 2022-06-15 PROCEDURE — 3078F DIAST BP <80 MM HG: CPT | Performed by: OTHER

## 2022-06-15 PROCEDURE — 99213 OFFICE O/P EST LOW 20 MIN: CPT | Performed by: OTHER

## 2022-06-15 PROCEDURE — 3074F SYST BP LT 130 MM HG: CPT | Performed by: OTHER

## 2022-06-15 PROCEDURE — 3008F BODY MASS INDEX DOCD: CPT | Performed by: OTHER

## 2022-07-06 DIAGNOSIS — G89.29 OTHER CHRONIC PAIN: ICD-10-CM

## 2022-07-06 NOTE — TELEPHONE ENCOUNTER
Refill needed     HYDROcodone-acetaminophen [facility] AND [facility])  MG Oral Tab    [facility] DRUG STORE #[id number] [place], [place] - [address] AT [address] [place] [address] [place] O[place]UNA, [phone], [phone]

## 2022-07-07 RX ORDER — HYDROCODONE BITARTRATE AND ACETAMINOPHEN 10; 325 MG/1; MG/1
1 TABLET ORAL EVERY 8 HOURS PRN
Qty: 180 TABLET | Refills: 0 | Status: SHIPPED | OUTPATIENT
Start: 2022-07-07

## 2022-07-26 ENCOUNTER — OFFICE VISIT (OUTPATIENT)
Dept: ELECTROPHYSIOLOGY | Facility: HOSPITAL | Age: 62
End: 2022-07-26
Attending: Other
Payer: COMMERCIAL

## 2022-07-26 DIAGNOSIS — R20.0 LEFT ARM NUMBNESS: ICD-10-CM

## 2022-07-26 DIAGNOSIS — G56.03 BILATERAL CARPAL TUNNEL SYNDROME: ICD-10-CM

## 2022-07-26 PROCEDURE — 95912 NRV CNDJ TEST 11-12 STUDIES: CPT | Performed by: OTHER

## 2022-07-26 PROCEDURE — 95886 MUSC TEST DONE W/N TEST COMP: CPT | Performed by: OTHER

## 2022-07-26 NOTE — PROCEDURES
[facility]  Nerve Conduction & EMG Report                      Smiley Hancock, [address]   EMG department   [address]  [place], [address] [facility]  [phone]          Patient name: Kamari Crowe  YOB: 1960  Referring provider: Dr. Zane Luo  Reason for study: Evaluate for suspected carpal tunnel  Brief history: 58year old female with a 2-month history of paresthesias in the bilateral hands, that can radiate up into her arms. She also has chronic neck pain, and tingling that can radiate into the left shoulder. Sensory and motor nerve conduction studies, and needle EMG:  Please see separate sheet for waveforms and quantitative nerve conduction data, as well as for tabulated form of electromyographic data. Summary:   1. The right median sensory response had slightly decreased amplitude. The left median sensory response was normal.  However, when a median to ulnar palmar comparison study was performed on the left, the median response had significantly longer distal latency. 2.  Bilateral ulnar and left radial sensory responses were normal.  3.  The bilateral median motor responses were normal.  4.  The bilateral ulnar motor responses were normal.  5.  Needle EMG using a concentric needle was performed on selected muscles of the left upper extremity. All muscles tested were normal.    Conclusion:  1. 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  2.  There is no electrophysiologic evidence of a cervical radiculopathy on this study.       Smiley Hancock, DO  Neurology and Neuromuscular medicine  [facility]

## 2022-08-08 DIAGNOSIS — G89.29 OTHER CHRONIC PAIN: ICD-10-CM

## 2022-08-08 NOTE — TELEPHONE ENCOUNTER
Pt requesting refill     HYDROcodone-acetaminophen (NORCO)  MG Oral Tab    metoprolol succinate ER 50 MG Oral Tablet 24 Hr    [facility] DRUG STORE #[id number] [facility] [address] AT [place] [address] [place], [phone], [phone]

## 2022-08-09 NOTE — TELEPHONE ENCOUNTER
No Protocol     Requesting: hydrocodone-acetaminophen 10-325mg     LOV:6/6/22   RTC: 4 weeks   Filled: 7/7/22 #180 0 refills   Recent Labs: 11/27/21     Upcoming OV: none scheduled

## 2022-08-10 NOTE — TELEPHONE ENCOUNTER
Pt checking status of refill. Per pt she only needs refill for #90 not #180.      Pt is requesting call back once refill has been sent [phone]

## 2022-08-11 RX ORDER — HYDROCODONE BITARTRATE AND ACETAMINOPHEN 10; 325 MG/1; MG/1
1 TABLET ORAL EVERY 8 HOURS PRN
Qty: 90 TABLET | Refills: 0 | Status: SHIPPED | OUTPATIENT
Start: 2022-08-11

## 2022-08-15 ENCOUNTER — TELEPHONE (OUTPATIENT)
Dept: NEUROLOGY | Facility: CLINIC | Age: 62
End: 2022-08-15

## 2022-09-08 DIAGNOSIS — G89.29 OTHER CHRONIC PAIN: ICD-10-CM

## 2022-09-08 RX ORDER — PANTOPRAZOLE SODIUM 40 MG/1
40 TABLET, DELAYED RELEASE ORAL
Qty: 90 TABLET | Refills: 0 | OUTPATIENT
Start: 2022-09-08

## 2022-09-08 NOTE — TELEPHONE ENCOUNTER
Pt requesting refills:    HYDROcodone-acetaminophen (NORCO)  MG Oral Tab    PANTOPRAZOLE 40 MG Oral Tab EC    [facility] DRUG STORE #40106 [place], [place] - [address] AT [place] [address] [address]UNA, [phone], [phone]

## 2022-09-09 RX ORDER — HYDROCODONE BITARTRATE AND ACETAMINOPHEN 10; 325 MG/1; MG/1
1 TABLET ORAL EVERY 8 HOURS PRN
Qty: 90 TABLET | Refills: 0 | Status: SHIPPED | OUTPATIENT
Start: 2022-09-09

## 2022-09-14 ENCOUNTER — OFFICE VISIT (OUTPATIENT)
Dept: NEUROLOGY | Facility: CLINIC | Age: 62
End: 2022-09-14
Payer: COMMERCIAL

## 2022-09-14 VITALS
WEIGHT: 176 LBS | BODY MASS INDEX: 29.32 KG/M2 | HEIGHT: 65 IN | RESPIRATION RATE: 16 BRPM | SYSTOLIC BLOOD PRESSURE: 110 MMHG | DIASTOLIC BLOOD PRESSURE: 88 MMHG | HEART RATE: 64 BPM

## 2022-09-14 DIAGNOSIS — M50.122 CERVICAL DISC DISORDER AT C5-C6 LEVEL WITH RADICULOPATHY: ICD-10-CM

## 2022-09-14 DIAGNOSIS — G56.03 BILATERAL CARPAL TUNNEL SYNDROME: ICD-10-CM

## 2022-09-14 DIAGNOSIS — G60.8 SENSORY PERIPHERAL NEUROPATHY: Primary | ICD-10-CM

## 2022-09-14 PROCEDURE — 3079F DIAST BP 80-89 MM HG: CPT | Performed by: OTHER

## 2022-09-14 PROCEDURE — 3008F BODY MASS INDEX DOCD: CPT | Performed by: OTHER

## 2022-09-14 PROCEDURE — 99213 OFFICE O/P EST LOW 20 MIN: CPT | Performed by: OTHER

## 2022-09-14 PROCEDURE — 3074F SYST BP LT 130 MM HG: CPT | Performed by: OTHER

## 2022-09-14 RX ORDER — NORTRIPTYLINE HYDROCHLORIDE 25 MG/1
25 CAPSULE ORAL NIGHTLY
Qty: 30 CAPSULE | Refills: 5 | Status: SHIPPED | OUTPATIENT
Start: 2022-09-14

## 2022-09-14 RX ORDER — GABAPENTIN 100 MG/1
100 CAPSULE ORAL 2 TIMES DAILY
Qty: 60 CAPSULE | Refills: 2 | Status: SHIPPED | OUTPATIENT
Start: 2022-09-14

## 2022-10-06 DIAGNOSIS — G89.29 OTHER CHRONIC PAIN: ICD-10-CM

## 2022-10-06 NOTE — TELEPHONE ENCOUNTER
Pt requesting refill -     HYDROcodone-acetaminophen [facility] AND [facility])  MG Oral Tab    [facility] DRUG STORE #24971 [place], [place] - [address] AT [address] [place], [phone], [phone]

## 2022-10-07 RX ORDER — HYDROCODONE BITARTRATE AND ACETAMINOPHEN 10; 325 MG/1; MG/1
1 TABLET ORAL EVERY 8 HOURS PRN
Qty: 90 TABLET | Refills: 0 | Status: SHIPPED | OUTPATIENT
Start: 2022-10-07

## 2022-10-10 ENCOUNTER — TELEPHONE (OUTPATIENT)
Dept: INTERNAL MEDICINE CLINIC | Facility: CLINIC | Age: 62
End: 2022-10-10

## 2022-10-10 ENCOUNTER — LAB ENCOUNTER (OUTPATIENT)
Dept: LAB | Age: 62
End: 2022-10-10
Attending: INTERNAL MEDICINE
Payer: COMMERCIAL

## 2022-10-10 DIAGNOSIS — N30.00 ACUTE CYSTITIS WITHOUT HEMATURIA: Primary | ICD-10-CM

## 2022-10-10 DIAGNOSIS — N30.00 ACUTE CYSTITIS WITHOUT HEMATURIA: ICD-10-CM

## 2022-10-10 LAB
BILIRUB UR QL STRIP.AUTO: NEGATIVE
COLOR UR AUTO: YELLOW
GLUCOSE UR STRIP.AUTO-MCNC: NEGATIVE MG/DL
KETONES UR STRIP.AUTO-MCNC: NEGATIVE MG/DL
NITRITE UR QL STRIP.AUTO: NEGATIVE
PH UR STRIP.AUTO: 5 [PH] (ref 5–8)
PROT UR STRIP.AUTO-MCNC: 100 MG/DL
RBC #/AREA URNS AUTO: >10 /HPF
SP GR UR STRIP.AUTO: 1.03 (ref 1–1.03)
UROBILINOGEN UR STRIP.AUTO-MCNC: <2 MG/DL
WBC #/AREA URNS AUTO: >50 /HPF

## 2022-10-10 PROCEDURE — 87086 URINE CULTURE/COLONY COUNT: CPT

## 2022-10-10 PROCEDURE — 81001 URINALYSIS AUTO W/SCOPE: CPT

## 2022-10-10 RX ORDER — CIPROFLOXACIN 500 MG/1
500 TABLET, FILM COATED ORAL 2 TIMES DAILY
Qty: 10 TABLET | Refills: 0 | Status: SHIPPED | OUTPATIENT
Start: 2022-10-10 | End: 2022-10-15

## 2022-10-10 NOTE — TELEPHONE ENCOUNTER
Pt stated she has UTI. Per pt she has frequent urination to the point she is in the bathroom every 2 minutes with little urination, little burning sensation ans some side pain. Offered appt for tomorrow. Pt stated that she is unable to come in tomorrow due to work and other appt times offered did not work with her schedule. Pt wanted to know if Dr Berta Moran would be able to send urinalysis orders to the lab at [place] instead.      Confirmed [phone]

## 2022-10-10 NOTE — TELEPHONE ENCOUNTER
Spoke with patient via phone. Informed VM phoned in urinalysis/culture order and also sent antibiotic prescription to patient's pharmacy. Advised to provide urine specimen first then start antibiotic. Patient verbalized understanding and will leave now to give specimen.

## 2022-11-07 DIAGNOSIS — G89.29 OTHER CHRONIC PAIN: ICD-10-CM

## 2022-11-07 NOTE — TELEPHONE ENCOUNTER
Pt requesting refill    HYDROcodone-acetaminophen [facility] AND [facility])  MG Oral Tab    [facility] DRUG STORE #97827 [place], [place] - [address] AT [place] [address] [address], [phone], [phone]

## 2022-11-08 RX ORDER — HYDROCODONE BITARTRATE AND ACETAMINOPHEN 10; 325 MG/1; MG/1
1 TABLET ORAL EVERY 8 HOURS PRN
Qty: 90 TABLET | Refills: 0 | Status: SHIPPED | OUTPATIENT
Start: 2022-11-08

## 2022-12-05 DIAGNOSIS — G89.29 OTHER CHRONIC PAIN: ICD-10-CM

## 2022-12-05 NOTE — TELEPHONE ENCOUNTER
Pt requesting refill:    HYDROcodone-acetaminophen [facility] AND [facility])  MG Oral Tab    [facility] DRUG STORE #66594 [place], [place] - [address] AT [address] [place], [phone], [phone]

## 2022-12-07 RX ORDER — HYDROCODONE BITARTRATE AND ACETAMINOPHEN 10; 325 MG/1; MG/1
1 TABLET ORAL EVERY 8 HOURS PRN
Qty: 90 TABLET | Refills: 0 | Status: SHIPPED | OUTPATIENT
Start: 2022-12-07

## 2023-01-06 DIAGNOSIS — Z00.00 ROUTINE GENERAL MEDICAL EXAMINATION AT A HEALTH CARE FACILITY: Primary | ICD-10-CM

## 2023-01-06 DIAGNOSIS — E78.00 PURE HYPERCHOLESTEROLEMIA: ICD-10-CM

## 2023-01-06 DIAGNOSIS — I10 ESSENTIAL HYPERTENSION: ICD-10-CM

## 2023-01-06 DIAGNOSIS — G89.29 OTHER CHRONIC PAIN: ICD-10-CM

## 2023-01-06 NOTE — TELEPHONE ENCOUNTER
Pt requesting refill:    HYDROcodone-acetaminophen [facility] AND [facility])  MG Oral Tab    [facility] DRUG STORE #25204 [place]hay [place], [place] - [address] AT [facility] [address] [facility] [place]anda O[place], [phone], [phone]

## 2023-01-10 RX ORDER — HYDROCODONE BITARTRATE AND ACETAMINOPHEN 10; 325 MG/1; MG/1
1 TABLET ORAL EVERY 8 HOURS PRN
Qty: 90 TABLET | Refills: 0 | Status: SHIPPED | OUTPATIENT
Start: 2023-01-10

## 2023-01-27 ENCOUNTER — ORDER TRANSCRIPTION (OUTPATIENT)
Dept: ADMINISTRATIVE | Facility: HOSPITAL | Age: 63
End: 2023-01-27

## 2023-01-27 DIAGNOSIS — Z20.822 ENCOUNTER FOR PREPROCEDURE SCREENING LABORATORY TESTING FOR COVID-19: Primary | ICD-10-CM

## 2023-01-27 DIAGNOSIS — Z01.812 ENCOUNTER FOR PREPROCEDURE SCREENING LABORATORY TESTING FOR COVID-19: Primary | ICD-10-CM

## 2023-01-27 DIAGNOSIS — Z12.31 ENCOUNTER FOR SCREENING MAMMOGRAM FOR MALIGNANT NEOPLASM OF BREAST: Primary | ICD-10-CM

## 2023-01-29 ENCOUNTER — LAB ENCOUNTER (OUTPATIENT)
Dept: LAB | Facility: HOSPITAL | Age: 63
End: 2023-01-29
Attending: ORTHOPAEDIC SURGERY
Payer: OTHER MISCELLANEOUS

## 2023-01-29 DIAGNOSIS — Z01.812 ENCOUNTER FOR PREPROCEDURE SCREENING LABORATORY TESTING FOR COVID-19: ICD-10-CM

## 2023-01-29 DIAGNOSIS — Z20.822 ENCOUNTER FOR PREPROCEDURE SCREENING LABORATORY TESTING FOR COVID-19: ICD-10-CM

## 2023-01-30 LAB — SARS-COV-2 RNA RESP QL NAA+PROBE: NOT DETECTED

## 2023-01-31 ENCOUNTER — LAB ENCOUNTER (OUTPATIENT)
Dept: LAB | Age: 63
End: 2023-01-31
Attending: INTERNAL MEDICINE
Payer: COMMERCIAL

## 2023-01-31 DIAGNOSIS — E78.00 PURE HYPERCHOLESTEROLEMIA: ICD-10-CM

## 2023-01-31 DIAGNOSIS — I10 ESSENTIAL HYPERTENSION: ICD-10-CM

## 2023-01-31 DIAGNOSIS — Z00.00 ROUTINE GENERAL MEDICAL EXAMINATION AT A HEALTH CARE FACILITY: ICD-10-CM

## 2023-01-31 LAB
ALBUMIN SERPL-MCNC: 3.5 G/DL (ref 3.4–5)
ALBUMIN/GLOB SERPL: 0.9 {RATIO} (ref 1–2)
ALP LIVER SERPL-CCNC: 100 U/L
ALT SERPL-CCNC: 23 U/L
ANION GAP SERPL CALC-SCNC: 2 MMOL/L (ref 0–18)
AST SERPL-CCNC: 18 U/L (ref 15–37)
BASOPHILS # BLD AUTO: 0.04 X10(3) UL (ref 0–0.2)
BASOPHILS NFR BLD AUTO: 0.4 %
BILIRUB SERPL-MCNC: 0.4 MG/DL (ref 0.1–2)
BILIRUB UR QL STRIP.AUTO: NEGATIVE
BUN BLD-MCNC: 22 MG/DL (ref 7–18)
CALCIUM BLD-MCNC: 9.4 MG/DL (ref 8.5–10.1)
CHLORIDE SERPL-SCNC: 109 MMOL/L (ref 98–112)
CHOLEST SERPL-MCNC: 188 MG/DL (ref ?–200)
CLARITY UR REFRACT.AUTO: CLEAR
CO2 SERPL-SCNC: 30 MMOL/L (ref 21–32)
COLOR UR AUTO: YELLOW
CREAT BLD-MCNC: 0.89 MG/DL
EOSINOPHIL # BLD AUTO: 0.19 X10(3) UL (ref 0–0.7)
EOSINOPHIL NFR BLD AUTO: 1.9 %
ERYTHROCYTE [DISTWIDTH] IN BLOOD BY AUTOMATED COUNT: 12.8 %
EST. AVERAGE GLUCOSE BLD GHB EST-MCNC: 120 MG/DL (ref 68–126)
FASTING PATIENT LIPID ANSWER: YES
FASTING STATUS PATIENT QL REPORTED: YES
GFR SERPLBLD BASED ON 1.73 SQ M-ARVRAT: 73 ML/MIN/1.73M2 (ref 60–?)
GLOBULIN PLAS-MCNC: 3.9 G/DL (ref 2.8–4.4)
GLUCOSE BLD-MCNC: 119 MG/DL (ref 70–99)
GLUCOSE UR STRIP.AUTO-MCNC: NEGATIVE MG/DL
HBA1C MFR BLD: 5.8 % (ref ?–5.7)
HCT VFR BLD AUTO: 45.6 %
HDLC SERPL-MCNC: 47 MG/DL (ref 40–59)
HGB BLD-MCNC: 14.5 G/DL
IMM GRANULOCYTES # BLD AUTO: 0.02 X10(3) UL (ref 0–1)
IMM GRANULOCYTES NFR BLD: 0.2 %
KETONES UR STRIP.AUTO-MCNC: NEGATIVE MG/DL
LDLC SERPL CALC-MCNC: 121 MG/DL (ref ?–100)
LEUKOCYTE ESTERASE UR QL STRIP.AUTO: NEGATIVE
LYMPHOCYTES # BLD AUTO: 2.74 X10(3) UL (ref 1–4)
LYMPHOCYTES NFR BLD AUTO: 27.5 %
MCH RBC QN AUTO: 31 PG (ref 26–34)
MCHC RBC AUTO-ENTMCNC: 31.8 G/DL (ref 31–37)
MCV RBC AUTO: 97.4 FL
MONOCYTES # BLD AUTO: 0.58 X10(3) UL (ref 0.1–1)
MONOCYTES NFR BLD AUTO: 5.8 %
NEUTROPHILS # BLD AUTO: 6.39 X10 (3) UL (ref 1.5–7.7)
NEUTROPHILS # BLD AUTO: 6.39 X10(3) UL (ref 1.5–7.7)
NEUTROPHILS NFR BLD AUTO: 64.2 %
NITRITE UR QL STRIP.AUTO: NEGATIVE
NONHDLC SERPL-MCNC: 141 MG/DL (ref ?–130)
OSMOLALITY SERPL CALC.SUM OF ELEC: 296 MOSM/KG (ref 275–295)
PH UR STRIP.AUTO: 5.5 [PH] (ref 5–8)
PLATELET # BLD AUTO: 375 10(3)UL (ref 150–450)
POTASSIUM SERPL-SCNC: 5.2 MMOL/L (ref 3.5–5.1)
PROT SERPL-MCNC: 7.4 G/DL (ref 6.4–8.2)
PROT UR STRIP.AUTO-MCNC: NEGATIVE MG/DL
RBC # BLD AUTO: 4.68 X10(6)UL
RBC UR QL AUTO: NEGATIVE
SODIUM SERPL-SCNC: 141 MMOL/L (ref 136–145)
SP GR UR STRIP.AUTO: >=1.03 (ref 1–1.03)
T4 SERPL-MCNC: 8.6 UG/DL
TRIGL SERPL-MCNC: 108 MG/DL (ref 30–149)
TSI SER-ACNC: 2.12 MIU/ML (ref 0.36–3.74)
UROBILINOGEN UR STRIP.AUTO-MCNC: 0.2 MG/DL
VLDLC SERPL CALC-MCNC: 19 MG/DL (ref 0–30)
WBC # BLD AUTO: 10 X10(3) UL (ref 4–11)

## 2023-01-31 PROCEDURE — 84436 ASSAY OF TOTAL THYROXINE: CPT

## 2023-01-31 PROCEDURE — 83036 HEMOGLOBIN GLYCOSYLATED A1C: CPT

## 2023-01-31 PROCEDURE — 80053 COMPREHEN METABOLIC PANEL: CPT

## 2023-01-31 PROCEDURE — 81003 URINALYSIS AUTO W/O SCOPE: CPT

## 2023-01-31 PROCEDURE — 84443 ASSAY THYROID STIM HORMONE: CPT

## 2023-01-31 PROCEDURE — 36415 COLL VENOUS BLD VENIPUNCTURE: CPT

## 2023-01-31 PROCEDURE — 85025 COMPLETE CBC W/AUTO DIFF WBC: CPT

## 2023-01-31 PROCEDURE — 80061 LIPID PANEL: CPT

## 2023-02-02 ENCOUNTER — OFFICE VISIT (OUTPATIENT)
Dept: INTERNAL MEDICINE CLINIC | Facility: CLINIC | Age: 63
End: 2023-02-02
Payer: COMMERCIAL

## 2023-02-02 VITALS
BODY MASS INDEX: 29.53 KG/M2 | TEMPERATURE: 98 F | OXYGEN SATURATION: 100 % | WEIGHT: 179.38 LBS | HEIGHT: 65.5 IN | DIASTOLIC BLOOD PRESSURE: 88 MMHG | RESPIRATION RATE: 16 BRPM | HEART RATE: 78 BPM | SYSTOLIC BLOOD PRESSURE: 150 MMHG

## 2023-02-02 DIAGNOSIS — Z00.00 ROUTINE GENERAL MEDICAL EXAMINATION AT A HEALTH CARE FACILITY: Primary | ICD-10-CM

## 2023-02-02 DIAGNOSIS — G89.29 OTHER CHRONIC PAIN: ICD-10-CM

## 2023-02-02 DIAGNOSIS — R73.03 PREDIABETES: ICD-10-CM

## 2023-02-02 DIAGNOSIS — I10 ESSENTIAL HYPERTENSION: Chronic | ICD-10-CM

## 2023-02-02 DIAGNOSIS — E78.00 PURE HYPERCHOLESTEROLEMIA: ICD-10-CM

## 2023-02-02 LAB
ATRIAL RATE: 62 BPM
P AXIS: 54 DEGREES
P-R INTERVAL: 138 MS
Q-T INTERVAL: 440 MS
QRS DURATION: 92 MS
QTC CALCULATION (BEZET): 446 MS
R AXIS: -1 DEGREES
T AXIS: 30 DEGREES
VENTRICULAR RATE: 62 BPM

## 2023-02-02 PROCEDURE — 3008F BODY MASS INDEX DOCD: CPT | Performed by: INTERNAL MEDICINE

## 2023-02-02 PROCEDURE — 3077F SYST BP >= 140 MM HG: CPT | Performed by: INTERNAL MEDICINE

## 2023-02-02 PROCEDURE — 93000 ELECTROCARDIOGRAM COMPLETE: CPT | Performed by: INTERNAL MEDICINE

## 2023-02-02 PROCEDURE — 3079F DIAST BP 80-89 MM HG: CPT | Performed by: INTERNAL MEDICINE

## 2023-02-02 PROCEDURE — 99213 OFFICE O/P EST LOW 20 MIN: CPT | Performed by: INTERNAL MEDICINE

## 2023-02-02 PROCEDURE — 99396 PREV VISIT EST AGE 40-64: CPT | Performed by: INTERNAL MEDICINE

## 2023-02-02 RX ORDER — ATORVASTATIN CALCIUM 20 MG/1
20 TABLET, FILM COATED ORAL NIGHTLY
Qty: 90 TABLET | Refills: 3 | Status: SHIPPED | OUTPATIENT
Start: 2023-02-02

## 2023-02-02 RX ORDER — PANTOPRAZOLE SODIUM 40 MG/1
40 TABLET, DELAYED RELEASE ORAL
Qty: 30 TABLET | Refills: 11 | Status: SHIPPED | OUTPATIENT
Start: 2023-02-02 | End: 2024-02-02

## 2023-02-02 RX ORDER — METOPROLOL SUCCINATE 50 MG/1
50 TABLET, EXTENDED RELEASE ORAL DAILY
Qty: 30 TABLET | Refills: 3 | Status: SHIPPED | OUTPATIENT
Start: 2023-02-02

## 2023-02-02 RX ORDER — HYDROCODONE BITARTRATE AND ACETAMINOPHEN 10; 325 MG/1; MG/1
1 TABLET ORAL EVERY 8 HOURS PRN
Qty: 90 TABLET | Refills: 0 | Status: SHIPPED | OUTPATIENT
Start: 2023-02-02

## 2023-02-03 ENCOUNTER — TELEPHONE (OUTPATIENT)
Dept: INTERNAL MEDICINE CLINIC | Facility: CLINIC | Age: 63
End: 2023-02-03

## 2023-02-03 NOTE — TELEPHONE ENCOUNTER
Incoming fax from [facility] with [facility] paperwork to be reviewed and completed   Placed in VM in-basket

## 2023-02-06 ENCOUNTER — TELEPHONE (OUTPATIENT)
Dept: INTERNAL MEDICINE CLINIC | Facility: CLINIC | Age: 63
End: 2023-02-06

## 2023-02-06 DIAGNOSIS — E87.5 SERUM POTASSIUM ELEVATED: Primary | ICD-10-CM

## 2023-02-10 NOTE — TELEPHONE ENCOUNTER
Paperwork has been filled and faxed back to [address] with a copy in the brown FMLA folder pod #2 and one sent to scan.

## 2023-02-26 ENCOUNTER — APPOINTMENT (OUTPATIENT)
Dept: GENERAL RADIOLOGY | Age: 63
End: 2023-02-26
Attending: NURSE PRACTITIONER
Payer: COMMERCIAL

## 2023-02-26 ENCOUNTER — HOSPITAL ENCOUNTER (OUTPATIENT)
Age: 63
Discharge: HOME | End: 2023-02-26
Payer: COMMERCIAL

## 2023-02-26 VITALS
HEART RATE: 79 BPM | OXYGEN SATURATION: 95 % | HEIGHT: 65 IN | WEIGHT: 170 LBS | RESPIRATION RATE: 20 BRPM | SYSTOLIC BLOOD PRESSURE: 123 MMHG | DIASTOLIC BLOOD PRESSURE: 74 MMHG | TEMPERATURE: 99 F | BODY MASS INDEX: 28.32 KG/M2

## 2023-02-26 DIAGNOSIS — J44.1 COPD EXACERBATION (HCC): Primary | ICD-10-CM

## 2023-02-26 LAB
S PYO AG THROAT QL IA.RAPID: NEGATIVE
SARS-COV-2 RNA RESP QL NAA+PROBE: NOT DETECTED

## 2023-02-26 PROCEDURE — 87651 STREP A DNA AMP PROBE: CPT | Performed by: NURSE PRACTITIONER

## 2023-02-26 PROCEDURE — 94640 AIRWAY INHALATION TREATMENT: CPT

## 2023-02-26 PROCEDURE — 71046 X-RAY EXAM CHEST 2 VIEWS: CPT | Performed by: NURSE PRACTITIONER

## 2023-02-26 PROCEDURE — 99214 OFFICE O/P EST MOD 30 MIN: CPT

## 2023-02-26 RX ORDER — IPRATROPIUM BROMIDE AND ALBUTEROL SULFATE 2.5; .5 MG/3ML; MG/3ML
3 SOLUTION RESPIRATORY (INHALATION) ONCE
Status: COMPLETED | OUTPATIENT
Start: 2023-02-26 | End: 2023-02-26

## 2023-02-26 RX ORDER — AZITHROMYCIN 250 MG/1
TABLET, FILM COATED ORAL
Qty: 6 TABLET | Refills: 0 | Status: SHIPPED | OUTPATIENT
Start: 2023-02-26 | End: 2023-03-03

## 2023-02-26 RX ORDER — PREDNISONE 20 MG/1
60 TABLET ORAL ONCE
Status: COMPLETED | OUTPATIENT
Start: 2023-02-26 | End: 2023-02-26

## 2023-02-26 RX ORDER — PREDNISONE 10 MG/1
TABLET ORAL
Qty: 30 TABLET | Refills: 0 | Status: SHIPPED | OUTPATIENT
Start: 2023-02-26 | End: 2023-03-08

## 2023-03-02 ENCOUNTER — VIRTUAL PHONE E/M (OUTPATIENT)
Dept: INTERNAL MEDICINE CLINIC | Facility: CLINIC | Age: 63
End: 2023-03-02
Payer: COMMERCIAL

## 2023-03-02 DIAGNOSIS — J44.9 OBSTRUCTIVE LUNG DISEASE (GENERALIZED) (HCC): Primary | ICD-10-CM

## 2023-03-02 PROCEDURE — 99213 OFFICE O/P EST LOW 20 MIN: CPT | Performed by: INTERNAL MEDICINE

## 2023-03-02 RX ORDER — AMOXICILLIN AND CLAVULANATE POTASSIUM 500; 125 MG/1; MG/1
1 TABLET, FILM COATED ORAL 2 TIMES DAILY
Qty: 20 TABLET | Refills: 0 | Status: SHIPPED | OUTPATIENT
Start: 2023-03-02 | End: 2023-03-12

## 2023-03-02 NOTE — PROGRESS NOTES
Virtual Telephone Check-In    Alexis Ortega verbally consents to a Virtual/Telephone Check-In visit on 03/02/23. Patient has been referred to the [facility] website at [email or web address] to review the yearly Consent to Treat document. Patient understands and accepts financial responsibility for any deductible, co-insurance and/or co-pays associated with this service. Duration of the service: 13 minutes      Summary of topics discussed: Follow-up ER. Patient still struggling still has a little yellowish expectoration breathing is better and is using nebulizer treatment. Is some better but not 100%. COVID-negative. Patient advised to continue present management will cover with Augmentin since she is finished with the antibiotics continue her nebulizer and steroids if no better patient should call me we will try to get her in on Monday        Diagnoses and all orders for this visit:    Obstructive lung disease (generalized) ([facility] [address]  -     amoxicillin clavulanate 500-125 MG Oral Tab; Take 1 tablet by mouth 2 (two) times daily for 10 days.           Jordi Sharp MD

## 2023-03-03 DIAGNOSIS — G89.29 OTHER CHRONIC PAIN: ICD-10-CM

## 2023-03-04 RX ORDER — HYDROCODONE BITARTRATE AND ACETAMINOPHEN 10; 325 MG/1; MG/1
1 TABLET ORAL EVERY 8 HOURS PRN
Qty: 90 TABLET | Refills: 0 | Status: SHIPPED | OUTPATIENT
Start: 2023-03-04

## 2023-04-05 DIAGNOSIS — G89.29 OTHER CHRONIC PAIN: ICD-10-CM

## 2023-04-05 NOTE — TELEPHONE ENCOUNTER
Refill needed.     HYDROcodone-acetaminophen [facility] AND [facility])  MG Oral Tab    [facility] DRUG STORE #[id number] [place]ie [place], [address] AT [place] [address] [place] [address], [phone], [phone]

## 2023-04-07 RX ORDER — HYDROCODONE BITARTRATE AND ACETAMINOPHEN 10; 325 MG/1; MG/1
1 TABLET ORAL EVERY 8 HOURS PRN
Qty: 90 TABLET | Refills: 0 | Status: SHIPPED | OUTPATIENT
Start: 2023-04-07

## 2023-04-25 ENCOUNTER — OFFICE VISIT (OUTPATIENT)
Dept: INTERNAL MEDICINE CLINIC | Facility: CLINIC | Age: 63
End: 2023-04-25
Payer: COMMERCIAL

## 2023-04-25 ENCOUNTER — LAB ENCOUNTER (OUTPATIENT)
Dept: LAB | Age: 63
End: 2023-04-25
Attending: PHYSICIAN ASSISTANT
Payer: COMMERCIAL

## 2023-04-25 VITALS
HEIGHT: 65 IN | WEIGHT: 174.63 LBS | RESPIRATION RATE: 16 BRPM | SYSTOLIC BLOOD PRESSURE: 122 MMHG | TEMPERATURE: 97 F | HEART RATE: 66 BPM | BODY MASS INDEX: 29.09 KG/M2 | DIASTOLIC BLOOD PRESSURE: 70 MMHG | OXYGEN SATURATION: 97 %

## 2023-04-25 DIAGNOSIS — M79.641 PAIN IN BOTH HANDS: ICD-10-CM

## 2023-04-25 DIAGNOSIS — R20.2 PARESTHESIA OF BOTH HANDS: ICD-10-CM

## 2023-04-25 DIAGNOSIS — R25.2 SPASMS OF THE HANDS OR FEET: ICD-10-CM

## 2023-04-25 DIAGNOSIS — R20.2 PARESTHESIA OF BOTH HANDS: Primary | ICD-10-CM

## 2023-04-25 DIAGNOSIS — M79.642 PAIN IN BOTH HANDS: ICD-10-CM

## 2023-04-25 LAB
ANION GAP SERPL CALC-SCNC: 3 MMOL/L (ref 0–18)
BUN BLD-MCNC: 16 MG/DL (ref 7–18)
CALCIUM BLD-MCNC: 9 MG/DL (ref 8.5–10.1)
CHLORIDE SERPL-SCNC: 110 MMOL/L (ref 98–112)
CO2 SERPL-SCNC: 26 MMOL/L (ref 21–32)
CREAT BLD-MCNC: 0.74 MG/DL
FASTING STATUS PATIENT QL REPORTED: NO
FOLATE SERPL-MCNC: 21.1 NG/ML (ref 8.7–?)
GFR SERPLBLD BASED ON 1.73 SQ M-ARVRAT: 91 ML/MIN/1.73M2 (ref 60–?)
GLUCOSE BLD-MCNC: 110 MG/DL (ref 70–99)
MAGNESIUM SERPL-MCNC: 2.3 MG/DL (ref 1.6–2.6)
OSMOLALITY SERPL CALC.SUM OF ELEC: 290 MOSM/KG (ref 275–295)
POTASSIUM SERPL-SCNC: 3.9 MMOL/L (ref 3.5–5.1)
SODIUM SERPL-SCNC: 139 MMOL/L (ref 136–145)
VIT B12 SERPL-MCNC: 1702 PG/ML (ref 193–986)

## 2023-04-25 PROCEDURE — 3074F SYST BP LT 130 MM HG: CPT | Performed by: PHYSICIAN ASSISTANT

## 2023-04-25 PROCEDURE — 3078F DIAST BP <80 MM HG: CPT | Performed by: PHYSICIAN ASSISTANT

## 2023-04-25 PROCEDURE — 82746 ASSAY OF FOLIC ACID SERUM: CPT

## 2023-04-25 PROCEDURE — 99213 OFFICE O/P EST LOW 20 MIN: CPT | Performed by: PHYSICIAN ASSISTANT

## 2023-04-25 PROCEDURE — 83735 ASSAY OF MAGNESIUM: CPT

## 2023-04-25 PROCEDURE — 82607 VITAMIN B-12: CPT

## 2023-04-25 PROCEDURE — 80048 BASIC METABOLIC PNL TOTAL CA: CPT

## 2023-04-25 PROCEDURE — 36415 COLL VENOUS BLD VENIPUNCTURE: CPT

## 2023-04-25 PROCEDURE — 3008F BODY MASS INDEX DOCD: CPT | Performed by: PHYSICIAN ASSISTANT

## 2023-04-25 NOTE — PATIENT INSTRUCTIONS
Blood work today -- only labs ordered today by Adelia Lennox (NOT Dr. Viridiana Woods labs -- too early). Follow up with orthopedics this week.     May also need to see neurology -- Dr. Edelmira Orourke:  [address] [facility] [address], [place], [address] [place] [address] Rd  Phone: [phone]  (Also has an office in [place])

## 2023-05-05 DIAGNOSIS — G89.29 OTHER CHRONIC PAIN: ICD-10-CM

## 2023-05-05 NOTE — TELEPHONE ENCOUNTER
Pt called requesting a refill for her   HYDROcodone-acetaminophen [facility] AND [facility])  MG Oral Tab 90 tablet     [facility] DRUG STORE #81579 [place], [place] - [address] AT [address] [place] [address] [place] [place], [phone], [phone]

## 2023-05-08 RX ORDER — HYDROCODONE BITARTRATE AND ACETAMINOPHEN 10; 325 MG/1; MG/1
1 TABLET ORAL EVERY 8 HOURS PRN
Qty: 90 TABLET | Refills: 0 | Status: SHIPPED | OUTPATIENT
Start: 2023-05-08

## 2023-05-24 ENCOUNTER — OFFICE VISIT (OUTPATIENT)
Dept: NEUROLOGY | Facility: CLINIC | Age: 63
End: 2023-05-24
Payer: COMMERCIAL

## 2023-05-24 VITALS
BODY MASS INDEX: 29 KG/M2 | SYSTOLIC BLOOD PRESSURE: 132 MMHG | DIASTOLIC BLOOD PRESSURE: 70 MMHG | RESPIRATION RATE: 16 BRPM | WEIGHT: 173.63 LBS | HEART RATE: 70 BPM

## 2023-05-24 DIAGNOSIS — G56.03 BILATERAL CARPAL TUNNEL SYNDROME: ICD-10-CM

## 2023-05-24 DIAGNOSIS — R25.2 HAND CRAMPS: Primary | ICD-10-CM

## 2023-05-24 PROCEDURE — 99213 OFFICE O/P EST LOW 20 MIN: CPT | Performed by: OTHER

## 2023-05-24 PROCEDURE — 3075F SYST BP GE 130 - 139MM HG: CPT | Performed by: OTHER

## 2023-05-24 PROCEDURE — 3078F DIAST BP <80 MM HG: CPT | Performed by: OTHER

## 2023-05-24 NOTE — PROGRESS NOTES
Pt states here for tingling in both hands. Pt states unable to hold anything due to fingers locking up. Pt states feet have been the same still numb.

## 2023-06-06 DIAGNOSIS — G89.29 OTHER CHRONIC PAIN: ICD-10-CM

## 2023-06-06 NOTE — TELEPHONE ENCOUNTER
Refill needed:    HYDROcodone-acetaminophen [facility] AND [facility])  MG Oral Tab    [facility] DRUG STORE #39189 [place], [place] - [address] AT [address] [place] [address] [place] [address], [phone], [phone]

## 2023-06-07 NOTE — TELEPHONE ENCOUNTER
No Protocol     Requesting: HYDROcodone-acetaminophen (NORCO)  MG Oral Tab    LOV:2/2/23   RTC: 6 months   Filled: 5.8.23 # 90 0 refills     No future appointments.

## 2023-06-08 RX ORDER — HYDROCODONE BITARTRATE AND ACETAMINOPHEN 10; 325 MG/1; MG/1
1 TABLET ORAL EVERY 8 HOURS PRN
Qty: 90 TABLET | Refills: 0 | Status: SHIPPED | OUTPATIENT
Start: 2023-06-08

## 2023-07-06 DIAGNOSIS — G89.29 OTHER CHRONIC PAIN: ICD-10-CM

## 2023-07-06 NOTE — TELEPHONE ENCOUNTER
Refill needed:    HYDROcodone-acetaminophen [facility] AND [facility])  MG Oral Tab         [facility] DRUG STORE #75129 [place], [place] - [address] AT [address] [place] [address] [place], [phone], [phone]

## 2023-07-08 RX ORDER — HYDROCODONE BITARTRATE AND ACETAMINOPHEN 10; 325 MG/1; MG/1
1 TABLET ORAL EVERY 8 HOURS PRN
Qty: 90 TABLET | Refills: 0 | Status: SHIPPED | OUTPATIENT
Start: 2023-07-08

## 2023-07-10 ENCOUNTER — TELEPHONE (OUTPATIENT)
Dept: ORTHOPEDICS CLINIC | Facility: CLINIC | Age: 63
End: 2023-07-10

## 2023-07-10 DIAGNOSIS — M79.642 LEFT HAND PAIN: Primary | ICD-10-CM

## 2023-07-11 ENCOUNTER — HOSPITAL ENCOUNTER (OUTPATIENT)
Dept: GENERAL RADIOLOGY | Age: 63
Discharge: HOME | End: 2023-07-11
Attending: PHYSICIAN ASSISTANT
Payer: COMMERCIAL

## 2023-07-11 DIAGNOSIS — M79.642 LEFT HAND PAIN: ICD-10-CM

## 2023-07-11 PROCEDURE — 73130 X-RAY EXAM OF HAND: CPT | Performed by: PHYSICIAN ASSISTANT

## 2023-07-13 ENCOUNTER — HOSPITAL ENCOUNTER (OUTPATIENT)
Dept: MAMMOGRAPHY | Age: 63
Discharge: HOME | End: 2023-07-13
Attending: INTERNAL MEDICINE
Payer: COMMERCIAL

## 2023-07-13 DIAGNOSIS — Z00.00 ROUTINE GENERAL MEDICAL EXAMINATION AT A HEALTH CARE FACILITY: ICD-10-CM

## 2023-07-13 PROCEDURE — 77067 SCR MAMMO BI INCL CAD: CPT | Performed by: INTERNAL MEDICINE

## 2023-07-13 PROCEDURE — 77063 BREAST TOMOSYNTHESIS BI: CPT | Performed by: INTERNAL MEDICINE

## 2023-07-15 ENCOUNTER — HOSPITAL ENCOUNTER (OUTPATIENT)
Dept: BONE DENSITY | Age: 63
Discharge: HOME | End: 2023-07-15
Attending: INTERNAL MEDICINE
Payer: COMMERCIAL

## 2023-07-15 DIAGNOSIS — Z00.00 ROUTINE GENERAL MEDICAL EXAMINATION AT A HEALTH CARE FACILITY: ICD-10-CM

## 2023-07-15 PROCEDURE — 77080 DXA BONE DENSITY AXIAL: CPT | Performed by: INTERNAL MEDICINE

## 2023-07-19 NOTE — TELEPHONE ENCOUNTER
Patient experiencing \"thrush\" now is asking for something to be called into pharmacy; please call to triage [facility] M,W,F at 3pm resume Dialysis Friday 7/21/23

## 2023-07-25 DIAGNOSIS — I10 ESSENTIAL HYPERTENSION: Chronic | ICD-10-CM

## 2023-07-25 RX ORDER — METOPROLOL SUCCINATE 50 MG/1
50 TABLET, EXTENDED RELEASE ORAL DAILY
Qty: 90 TABLET | Refills: 1 | Status: SHIPPED | OUTPATIENT
Start: 2023-07-25

## 2023-07-25 NOTE — TELEPHONE ENCOUNTER
Passed protocol     Last refill:  Medication Quantity Refills Start End   metoprolol succinate ER 50 MG Oral Tablet 24 Hr 30 tablet 3 2/2/2023    Sig:   Take 1 tablet (50 mg total) by mouth daily.        LOV:   04/25/2023 Demetria Kowalski RTC June with Dr Elis Alcala   No FOV scheduled

## 2023-07-26 ENCOUNTER — HOSPITAL ENCOUNTER (OUTPATIENT)
Dept: MAMMOGRAPHY | Facility: HOSPITAL | Age: 63
Discharge: HOME | End: 2023-07-26
Attending: INTERNAL MEDICINE
Payer: COMMERCIAL

## 2023-07-26 DIAGNOSIS — R92.2 INCONCLUSIVE MAMMOGRAM: ICD-10-CM

## 2023-07-26 PROCEDURE — 77061 BREAST TOMOSYNTHESIS UNI: CPT | Performed by: INTERNAL MEDICINE

## 2023-07-26 PROCEDURE — 76642 ULTRASOUND BREAST LIMITED: CPT | Performed by: INTERNAL MEDICINE

## 2023-07-26 PROCEDURE — 77065 DX MAMMO INCL CAD UNI: CPT | Performed by: INTERNAL MEDICINE

## 2023-07-31 ENCOUNTER — OFFICE VISIT (OUTPATIENT)
Dept: ORTHOPEDICS CLINIC | Facility: CLINIC | Age: 63
End: 2023-07-31
Payer: COMMERCIAL

## 2023-07-31 VITALS — WEIGHT: 173 LBS | BODY MASS INDEX: 28.82 KG/M2 | HEIGHT: 65 IN

## 2023-07-31 DIAGNOSIS — M79.642 LEFT HAND PAIN: Primary | ICD-10-CM

## 2023-07-31 PROCEDURE — 99213 OFFICE O/P EST LOW 20 MIN: CPT | Performed by: PHYSICIAN ASSISTANT

## 2023-07-31 PROCEDURE — 3008F BODY MASS INDEX DOCD: CPT | Performed by: PHYSICIAN ASSISTANT

## 2023-07-31 RX ORDER — BACLOFEN 10 MG/1
TABLET ORAL
COMMUNITY
Start: 2023-07-26

## 2023-08-01 ENCOUNTER — TELEPHONE (OUTPATIENT)
Dept: ORTHOPEDICS CLINIC | Facility: CLINIC | Age: 63
End: 2023-08-01

## 2023-08-01 DIAGNOSIS — G56.02 CARPAL TUNNEL SYNDROME OF LEFT WRIST: Primary | ICD-10-CM

## 2023-08-01 NOTE — TELEPHONE ENCOUNTER
Can we please have an EMG order placed for the patient? She is trying to call central scheduling to schedule the appt.  Thank you!!

## 2023-08-03 ENCOUNTER — OFFICE VISIT (OUTPATIENT)
Dept: INTERNAL MEDICINE CLINIC | Facility: CLINIC | Age: 63
End: 2023-08-03
Payer: COMMERCIAL

## 2023-08-03 ENCOUNTER — TELEPHONE (OUTPATIENT)
Dept: ORTHOPEDICS CLINIC | Facility: CLINIC | Age: 63
End: 2023-08-03

## 2023-08-03 VITALS
WEIGHT: 178.19 LBS | RESPIRATION RATE: 16 BRPM | DIASTOLIC BLOOD PRESSURE: 90 MMHG | BODY MASS INDEX: 29.69 KG/M2 | HEIGHT: 65 IN | OXYGEN SATURATION: 98 % | SYSTOLIC BLOOD PRESSURE: 140 MMHG | TEMPERATURE: 97 F | HEART RATE: 65 BPM

## 2023-08-03 DIAGNOSIS — M79.641 PAIN IN BOTH HANDS: Primary | ICD-10-CM

## 2023-08-03 DIAGNOSIS — M79.642 PAIN IN BOTH HANDS: Primary | ICD-10-CM

## 2023-08-03 PROCEDURE — 3008F BODY MASS INDEX DOCD: CPT | Performed by: INTERNAL MEDICINE

## 2023-08-03 PROCEDURE — 3080F DIAST BP >= 90 MM HG: CPT | Performed by: INTERNAL MEDICINE

## 2023-08-03 PROCEDURE — 99213 OFFICE O/P EST LOW 20 MIN: CPT | Performed by: INTERNAL MEDICINE

## 2023-08-03 PROCEDURE — 3077F SYST BP >= 140 MM HG: CPT | Performed by: INTERNAL MEDICINE

## 2023-08-03 RX ORDER — LIDOCAINE 50 MG/G
1 PATCH TOPICAL DAILY
Qty: 30 PATCH | Refills: 0 | Status: SHIPPED | OUTPATIENT
Start: 2023-08-03

## 2023-08-03 NOTE — PATIENT INSTRUCTIONS
Patient informed that she needs to get the note from the treating doctors I am not the one who is managing her hands.

## 2023-08-04 NOTE — TELEPHONE ENCOUNTER
Done
Patient is calling for a work note. She is stating that her WC case was closed and she is to report to work tomorrow 8/4. Patient is not comfortable going to work when she is unable to type due to her LENY Hand Pain. Patient is scheduled for an EMG on 8/16 with [facility]. Please call patient for any questions.      Future Appointments   Date Time Provider Christelle Engel   8/16/2023  3:15 PM Diogo Butler, DO PM&R [place]
Requesting work note: expected back at work tomorrow. Patient not comfortable with that. Note pending.      Per last office visit:\"Her limitations on being able to work will be based off pain at this point\"      EMG Scheduled  Future Appointments   Date Time Provider Christelle Engel   8/16/2023  3:00 PM Ron Travis DO PM&R [place]
24-Jul-2018 03:33

## 2023-08-07 DIAGNOSIS — G89.29 OTHER CHRONIC PAIN: ICD-10-CM

## 2023-08-07 NOTE — TELEPHONE ENCOUNTER
Refill needed.     HYDROcodone-acetaminophen (NORCO)  MG Oral Tab 90 tablet 0 7/8/2023    [facility] DRUG STORE #49553 [facility] [address] AT [facility] [address] [facility] [place] [address], [phone], [phone]

## 2023-08-08 NOTE — TELEPHONE ENCOUNTER
No Protocol     Requesting: HYDROcodone-acetaminophen ([address])  MG     LOV: 8/3/23   RTC: none noted   Filled: 7/8/23 # 90 0 refills   Recent Labs: 4/25/23     Future Appointments   Date Time Provider Christelle Engel   8/16/2023  3:00 PM Nikki Lucero DO PM&R [place]

## 2023-08-12 RX ORDER — HYDROCODONE BITARTRATE AND ACETAMINOPHEN 10; 325 MG/1; MG/1
1 TABLET ORAL EVERY 8 HOURS PRN
Qty: 90 TABLET | Refills: 0 | Status: SHIPPED | OUTPATIENT
Start: 2023-08-12

## 2023-08-16 ENCOUNTER — TELEPHONE (OUTPATIENT)
Dept: ORTHOPEDICS CLINIC | Facility: CLINIC | Age: 63
End: 2023-08-16

## 2023-08-16 ENCOUNTER — PROCEDURE VISIT (OUTPATIENT)
Dept: PHYSICAL MEDICINE AND REHAB | Facility: CLINIC | Age: 63
End: 2023-08-16
Payer: COMMERCIAL

## 2023-08-16 DIAGNOSIS — R20.2 NUMBNESS AND TINGLING IN BOTH HANDS: Primary | ICD-10-CM

## 2023-08-16 DIAGNOSIS — M79.642 LEFT HAND PAIN: Primary | ICD-10-CM

## 2023-08-16 DIAGNOSIS — R20.0 NUMBNESS AND TINGLING IN BOTH HANDS: Primary | ICD-10-CM

## 2023-08-16 PROCEDURE — 95886 MUSC TEST DONE W/N TEST COMP: CPT | Performed by: PHYSICAL MEDICINE & REHABILITATION

## 2023-08-16 PROCEDURE — 95911 NRV CNDJ TEST 9-10 STUDIES: CPT | Performed by: PHYSICAL MEDICINE & REHABILITATION

## 2023-08-16 NOTE — TELEPHONE ENCOUNTER
Patient called and would like to request a work note because she won't be able to see in time the last note expires. She wants them to be uploaded to [employer]. Please advise. Thanks.     Patient may ne reached at [phone]

## 2023-08-16 NOTE — TELEPHONE ENCOUNTER
Letter pended for approval      Future Appointments   Date Time Provider Christelle Engel   8/24/2023 10:00 AM Solis Ferrer EMG ORTHO 75 EMG [facility]

## 2023-09-07 DIAGNOSIS — G89.29 OTHER CHRONIC PAIN: ICD-10-CM

## 2023-09-07 NOTE — TELEPHONE ENCOUNTER
Refills needed.     HYDROcodone-acetaminophen (NORCO)  MG Oral Tab    ondansetron (ZOFRAN) 4 mg tablet    [facility] DRUG STORE #90301 [facility], [place] - [address] AT [facility] [address] [facility] [place], [phone], [phone]

## 2023-09-08 RX ORDER — HYDROCODONE BITARTRATE AND ACETAMINOPHEN 10; 325 MG/1; MG/1
1 TABLET ORAL EVERY 8 HOURS PRN
Qty: 90 TABLET | Refills: 0 | Status: SHIPPED | OUTPATIENT
Start: 2023-09-08

## 2023-09-08 RX ORDER — ONDANSETRON 4 MG/1
4 TABLET, FILM COATED ORAL EVERY 8 HOURS PRN
Qty: 20 TABLET | Refills: 0 | Status: SHIPPED | OUTPATIENT
Start: 2023-09-08

## 2023-09-08 NOTE — TELEPHONE ENCOUNTER
Norco 10-325mg filled 8/12/23 # 90 0 refills   Ondansetron 4mg filled 11/23/21 # 20 2 refills     LOV: 8/3/23   RTC: none noted   Labs: 4/25/23   No future appointments.

## 2023-10-05 DIAGNOSIS — G89.29 OTHER CHRONIC PAIN: ICD-10-CM

## 2023-10-05 NOTE — TELEPHONE ENCOUNTER
Patient called requesting refill on     HYDROcodone-acetaminophen [facility] AND [facility])  MG Oral Tab    [facility] #[id number] [place], [place] - [address] AT [address], [phone], [phone]     Please advise.

## 2023-10-09 RX ORDER — HYDROCODONE BITARTRATE AND ACETAMINOPHEN 10; 325 MG/1; MG/1
1 TABLET ORAL EVERY 8 HOURS PRN
Qty: 90 TABLET | Refills: 0 | Status: SHIPPED | OUTPATIENT
Start: 2023-10-09

## 2023-10-09 NOTE — TELEPHONE ENCOUNTER
LOV: 8/3/23   RTC: none noted   Filled: 9/8/23 3 90 0 refills   Labs: 4/25/23   Future Appointments   Date Time Provider Christelle Engel   10/16/2023  8:30 AM Falguni Daley MD [facility] [id number]

## 2023-10-10 NOTE — TELEPHONE ENCOUNTER
Future Appointments   Date Time Provider Christelle Maren   10/16/2023  8:30 AM Latasha Hernandez MD [facility] [id number]   10/16/2023  4:45 PM Manuel Hess MD [facility]

## 2023-10-16 ENCOUNTER — OFFICE VISIT (OUTPATIENT)
Dept: INTERNAL MEDICINE CLINIC | Facility: CLINIC | Age: 63
End: 2023-10-16
Payer: COMMERCIAL

## 2023-10-16 ENCOUNTER — HOSPITAL ENCOUNTER (OUTPATIENT)
Dept: CT IMAGING | Facility: HOSPITAL | Age: 63
Discharge: HOME | End: 2023-10-16
Attending: ORTHOPAEDIC SURGERY
Payer: COMMERCIAL

## 2023-10-16 ENCOUNTER — OFFICE VISIT (OUTPATIENT)
Dept: ORTHOPEDICS CLINIC | Facility: CLINIC | Age: 63
End: 2023-10-16
Payer: COMMERCIAL

## 2023-10-16 VITALS
HEART RATE: 93 BPM | SYSTOLIC BLOOD PRESSURE: 152 MMHG | WEIGHT: 180.81 LBS | OXYGEN SATURATION: 99 % | HEIGHT: 65 IN | BODY MASS INDEX: 30.12 KG/M2 | TEMPERATURE: 97 F | DIASTOLIC BLOOD PRESSURE: 96 MMHG | RESPIRATION RATE: 16 BRPM

## 2023-10-16 VITALS — HEIGHT: 65 IN | BODY MASS INDEX: 29.16 KG/M2 | WEIGHT: 175 LBS

## 2023-10-16 DIAGNOSIS — G56.21 CUBITAL TUNNEL SYNDROME ON RIGHT: ICD-10-CM

## 2023-10-16 DIAGNOSIS — G56.40 COMPLEX REGIONAL PAIN SYNDROME TYPE 2, AFFECTING UNSPECIFIED SITE: Primary | Chronic | ICD-10-CM

## 2023-10-16 DIAGNOSIS — M77.8 WRIST TENDONITIS: Primary | ICD-10-CM

## 2023-10-16 DIAGNOSIS — G56.01 CARPAL TUNNEL SYNDROME OF RIGHT WRIST: ICD-10-CM

## 2023-10-16 DIAGNOSIS — M77.8 WRIST TENDONITIS: ICD-10-CM

## 2023-10-16 PROCEDURE — 3080F DIAST BP >= 90 MM HG: CPT | Performed by: INTERNAL MEDICINE

## 2023-10-16 PROCEDURE — 3008F BODY MASS INDEX DOCD: CPT | Performed by: INTERNAL MEDICINE

## 2023-10-16 PROCEDURE — 3077F SYST BP >= 140 MM HG: CPT | Performed by: INTERNAL MEDICINE

## 2023-10-16 PROCEDURE — 73200 CT UPPER EXTREMITY W/O DYE: CPT | Performed by: ORTHOPAEDIC SURGERY

## 2023-10-16 PROCEDURE — 99213 OFFICE O/P EST LOW 20 MIN: CPT | Performed by: INTERNAL MEDICINE

## 2023-10-16 RX ORDER — PREDNISOLONE ACETATE 10 MG/ML
1 SUSPENSION/ DROPS OPHTHALMIC 4 TIMES DAILY
COMMUNITY
Start: 2023-09-05

## 2023-10-16 NOTE — PATIENT INSTRUCTIONS
Patient also informed about the potential side effects of narcotics and associated withdrawal symptoms. Patient was advised to establish care with a pain service however the patient has got financial and insurance issues hence has difficulty in establishing a pain care service.   We will await the patient to fill her online portion of the medical marijuana card prior to me accessing the website

## 2023-10-23 ENCOUNTER — PATIENT MESSAGE (OUTPATIENT)
Dept: INTERNAL MEDICINE CLINIC | Facility: CLINIC | Age: 63
End: 2023-10-23

## 2023-10-23 ENCOUNTER — TELEPHONE (OUTPATIENT)
Dept: ORTHOPEDICS CLINIC | Facility: CLINIC | Age: 63
End: 2023-10-23

## 2023-10-23 DIAGNOSIS — M77.8 WRIST TENDONITIS: Primary | ICD-10-CM

## 2023-10-23 DIAGNOSIS — G56.01 CARPAL TUNNEL SYNDROME OF RIGHT WRIST: ICD-10-CM

## 2023-10-23 DIAGNOSIS — G56.21 CUBITAL TUNNEL SYNDROME ON RIGHT: ICD-10-CM

## 2023-10-23 NOTE — TELEPHONE ENCOUNTER
Date of Surgery: 10/27/23       Post Op Appt: 23 @ 10AM      Case ID: [id number]     Notes:           SURGICAL BOOKING SHEET   Name: Fannie Costa  MRN: [id number]   : 1960     Surgical Date:    10/27/23   Surgical Consent:    Revision right carpal tunnel release, Right cubital tunnel release, possible transposition, possible FCR tendon debridement   Diagnosis:     (M77.8) Wrist tendonitis  (primary encounter diagnosis)  (G56.22) Cubital tunnel syndrome on left  (G56.02) Carpal tunnel syndrome of left wrist    Procedure Codes:    Open carpal tunnel release (CPT 28296)  Cubital tunnel release (CPT 33711)  Wrist Tendon Debridement (62604)   Disposition:    Outpatient   Operative Time:    1.5 hrs   Antibiotics:    Ancef 2g   Anesthesia Type:    General and Regional   Clearance:     NONE   Equipment:    Submuscular Ulnar Nerve Transposition Sterile Tourniquet, 10 [place] Drain, 1% lidocaine with epinephrine, Vessel loops x2, Suture: 0 Vicryl UR-6, 3-0 Vicryl, 3-0 Monocryl, 4-0 Monocryl, 3-0 nylon, Skin glue & Steri-Strips , and Sling   Assistant:    López Assistant: Yes, Surgical Assist    Follow Up:    10-12 days post op with Chana Kyle   Pain Medication:    Norco 325-5mg   Therapy:    [facility]

## 2023-10-23 NOTE — PROGRESS NOTES
SURGICAL BOOKING SHEET   Name: Audrey Sanchez  MRN: [id number]   : 1960    Surgical Date:   10/27/23   Surgical Consent:   Revision right carpal tunnel release, Right cubital tunnel release, possible transposition, possible FCR tendon debridement   Diagnosis:    (M77.8) Wrist tendonitis  (primary encounter diagnosis)  (G56.22) Cubital tunnel syndrome on left  (G56.02) Carpal tunnel syndrome of left wrist    Procedure Codes:   Open carpal tunnel release (CPT 96333)  Cubital tunnel release (CPT 53094)  Wrist Tendon Debridement (18166)   Disposition:   Outpatient   Operative Time:   1.5 hrs   Antibiotics:   Ancef 2g   Anesthesia Type:   General and Regional   Clearance:    NONE   Equipment:   Submuscular Ulnar Nerve Transposition Sterile Tourniquet, 10 [place]ian Drain, 1% lidocaine with epinephrine, Vessel loops x2, Suture: 0 Vicryl UR-6, 3-0 Vicryl, 3-0 Monocryl, 4-0 Monocryl, 3-0 nylon, Skin glue & Steri-Strips , and Sling   Assistant:   López Assistant: Yes, Surgical Assist    Follow Up:   10-12 days post op with Devonte Ta   Pain Medication:   Norco 325-5mg   Therapy:   [facility]

## 2023-10-23 NOTE — TELEPHONE ENCOUNTER
From: Ben Rivera  To: Julian Alanis  Sent: 10/23/2023 11:11 AM CDT  Subject: Marijuana card    My portion is filled out, just waiting on your certification .     Thank you Berenice Quinonez

## 2023-10-24 ENCOUNTER — EKG ENCOUNTER (OUTPATIENT)
Dept: LAB | Age: 63
End: 2023-10-24
Attending: ORTHOPAEDIC SURGERY

## 2023-10-24 ENCOUNTER — LABORATORY ENCOUNTER (OUTPATIENT)
Dept: LAB | Age: 63
End: 2023-10-24
Attending: ORTHOPAEDIC SURGERY

## 2023-10-24 DIAGNOSIS — Z01.818 PRE-OP TESTING: ICD-10-CM

## 2023-10-24 LAB
ANION GAP SERPL CALC-SCNC: 3 MMOL/L (ref 0–18)
ATRIAL RATE: 67 BPM
BUN BLD-MCNC: 15 MG/DL (ref 7–18)
CALCIUM BLD-MCNC: 9.4 MG/DL (ref 8.5–10.1)
CHLORIDE SERPL-SCNC: 109 MMOL/L (ref 98–112)
CO2 SERPL-SCNC: 27 MMOL/L (ref 21–32)
CREAT BLD-MCNC: 0.86 MG/DL
EGFRCR SERPLBLD CKD-EPI 2021: 76 ML/MIN/1.73M2 (ref 60–?)
FASTING STATUS PATIENT QL REPORTED: YES
GLUCOSE BLD-MCNC: 100 MG/DL (ref 70–99)
OSMOLALITY SERPL CALC.SUM OF ELEC: 289 MOSM/KG (ref 275–295)
P AXIS: 44 DEGREES
P-R INTERVAL: 126 MS
POTASSIUM SERPL-SCNC: 4.8 MMOL/L (ref 3.5–5.1)
Q-T INTERVAL: 412 MS
QRS DURATION: 84 MS
QTC CALCULATION (BEZET): 435 MS
R AXIS: 6 DEGREES
SODIUM SERPL-SCNC: 139 MMOL/L (ref 136–145)
T AXIS: 19 DEGREES
VENTRICULAR RATE: 67 BPM

## 2023-10-24 PROCEDURE — 80048 BASIC METABOLIC PNL TOTAL CA: CPT

## 2023-10-24 PROCEDURE — 93010 ELECTROCARDIOGRAM REPORT: CPT | Performed by: INTERNAL MEDICINE

## 2023-10-24 PROCEDURE — 93005 ELECTROCARDIOGRAM TRACING: CPT

## 2023-10-24 PROCEDURE — 36415 COLL VENOUS BLD VENIPUNCTURE: CPT

## 2023-10-27 ENCOUNTER — ANESTHESIA EVENT (OUTPATIENT)
Dept: SURGERY | Facility: HOSPITAL | Age: 63
End: 2023-10-27
Payer: COMMERCIAL

## 2023-10-27 ENCOUNTER — ANESTHESIA (OUTPATIENT)
Dept: SURGERY | Facility: HOSPITAL | Age: 63
End: 2023-10-27
Payer: COMMERCIAL

## 2023-10-27 ENCOUNTER — HOSPITAL ENCOUNTER (OUTPATIENT)
Facility: HOSPITAL | Age: 63
Setting detail: HOSPITAL OUTPATIENT SURGERY
Discharge: HOME | End: 2023-10-27
Attending: ORTHOPAEDIC SURGERY | Admitting: ORTHOPAEDIC SURGERY
Payer: COMMERCIAL

## 2023-10-27 VITALS
BODY MASS INDEX: 29.38 KG/M2 | SYSTOLIC BLOOD PRESSURE: 128 MMHG | HEIGHT: 65 IN | DIASTOLIC BLOOD PRESSURE: 80 MMHG | WEIGHT: 176.38 LBS | OXYGEN SATURATION: 94 % | TEMPERATURE: 97 F | RESPIRATION RATE: 16 BRPM | HEART RATE: 73 BPM

## 2023-10-27 DIAGNOSIS — Z01.818 PRE-OP TESTING: Primary | ICD-10-CM

## 2023-10-27 PROCEDURE — 01N50ZZ RELEASE MEDIAN NERVE, OPEN APPROACH: ICD-10-PCS | Performed by: ORTHOPAEDIC SURGERY

## 2023-10-27 PROCEDURE — 76942 ECHO GUIDE FOR BIOPSY: CPT | Performed by: STUDENT IN AN ORGANIZED HEALTH CARE EDUCATION/TRAINING PROGRAM

## 2023-10-27 PROCEDURE — 01N40ZZ RELEASE ULNAR NERVE, OPEN APPROACH: ICD-10-PCS | Performed by: ORTHOPAEDIC SURGERY

## 2023-10-27 DEVICE — IMPLANTABLE DEVICE: Type: IMPLANTABLE DEVICE | Site: WRIST | Status: FUNCTIONAL

## 2023-10-27 RX ORDER — EPHEDRINE SULFATE 50 MG/ML
INJECTION INTRAVENOUS AS NEEDED
Status: DISCONTINUED | OUTPATIENT
Start: 2023-10-27 | End: 2023-10-27 | Stop reason: SURG

## 2023-10-27 RX ORDER — CEFAZOLIN SODIUM/WATER 2 G/20 ML
SYRINGE (ML) INTRAVENOUS
Status: DISCONTINUED
Start: 2023-10-27 | End: 2023-10-27

## 2023-10-27 RX ORDER — PROCHLORPERAZINE EDISYLATE 5 MG/ML
5 INJECTION INTRAMUSCULAR; INTRAVENOUS EVERY 8 HOURS PRN
Status: DISCONTINUED | OUTPATIENT
Start: 2023-10-27 | End: 2023-10-27

## 2023-10-27 RX ORDER — HYDROCODONE BITARTRATE AND ACETAMINOPHEN 5; 325 MG/1; MG/1
1 TABLET ORAL ONCE AS NEEDED
Status: DISCONTINUED | OUTPATIENT
Start: 2023-10-27 | End: 2023-10-27

## 2023-10-27 RX ORDER — LABETALOL HYDROCHLORIDE 5 MG/ML
5 INJECTION, SOLUTION INTRAVENOUS EVERY 5 MIN PRN
Status: DISCONTINUED | OUTPATIENT
Start: 2023-10-27 | End: 2023-10-27

## 2023-10-27 RX ORDER — ONDANSETRON 2 MG/ML
INJECTION INTRAMUSCULAR; INTRAVENOUS AS NEEDED
Status: DISCONTINUED | OUTPATIENT
Start: 2023-10-27 | End: 2023-10-27 | Stop reason: SURG

## 2023-10-27 RX ORDER — ACETAMINOPHEN 500 MG
1000 TABLET ORAL ONCE
Status: DISCONTINUED | OUTPATIENT
Start: 2023-10-27 | End: 2023-10-27 | Stop reason: HOSPADM

## 2023-10-27 RX ORDER — LIDOCAINE HYDROCHLORIDE 10 MG/ML
INJECTION, SOLUTION INFILTRATION; PERINEURAL AS NEEDED
Status: DISCONTINUED | OUTPATIENT
Start: 2023-10-27 | End: 2023-10-27 | Stop reason: HOSPADM

## 2023-10-27 RX ORDER — SCOLOPAMINE TRANSDERMAL SYSTEM 1 MG/1
1 PATCH, EXTENDED RELEASE TRANSDERMAL ONCE
Status: DISCONTINUED | OUTPATIENT
Start: 2023-10-27 | End: 2023-10-27 | Stop reason: HOSPADM

## 2023-10-27 RX ORDER — DEXAMETHASONE SODIUM PHOSPHATE 4 MG/ML
VIAL (ML) INJECTION AS NEEDED
Status: DISCONTINUED | OUTPATIENT
Start: 2023-10-27 | End: 2023-10-27 | Stop reason: SURG

## 2023-10-27 RX ORDER — ONDANSETRON 2 MG/ML
4 INJECTION INTRAMUSCULAR; INTRAVENOUS EVERY 6 HOURS PRN
Status: DISCONTINUED | OUTPATIENT
Start: 2023-10-27 | End: 2023-10-27

## 2023-10-27 RX ORDER — HYDROCODONE BITARTRATE AND ACETAMINOPHEN 10; 325 MG/1; MG/1
1 TABLET ORAL EVERY 6 HOURS PRN
Qty: 20 TABLET | Refills: 0 | Status: SHIPPED | OUTPATIENT
Start: 2023-10-27 | End: 2023-11-06

## 2023-10-27 RX ORDER — HYDROCODONE BITARTRATE AND ACETAMINOPHEN 5; 325 MG/1; MG/1
2 TABLET ORAL ONCE AS NEEDED
Status: DISCONTINUED | OUTPATIENT
Start: 2023-10-27 | End: 2023-10-27

## 2023-10-27 RX ORDER — NALOXONE HYDROCHLORIDE 0.4 MG/ML
80 INJECTION, SOLUTION INTRAMUSCULAR; INTRAVENOUS; SUBCUTANEOUS AS NEEDED
Status: DISCONTINUED | OUTPATIENT
Start: 2023-10-27 | End: 2023-10-27

## 2023-10-27 RX ORDER — HYDROMORPHONE HYDROCHLORIDE 1 MG/ML
0.4 INJECTION, SOLUTION INTRAMUSCULAR; INTRAVENOUS; SUBCUTANEOUS EVERY 5 MIN PRN
Status: DISCONTINUED | OUTPATIENT
Start: 2023-10-27 | End: 2023-10-27

## 2023-10-27 RX ORDER — SODIUM CHLORIDE, SODIUM LACTATE, POTASSIUM CHLORIDE, CALCIUM CHLORIDE 600; 310; 30; 20 MG/100ML; MG/100ML; MG/100ML; MG/100ML
INJECTION, SOLUTION INTRAVENOUS CONTINUOUS
Status: DISCONTINUED | OUTPATIENT
Start: 2023-10-27 | End: 2023-10-27

## 2023-10-27 RX ORDER — ACETAMINOPHEN 500 MG
1000 TABLET ORAL ONCE AS NEEDED
Status: DISCONTINUED | OUTPATIENT
Start: 2023-10-27 | End: 2023-10-27

## 2023-10-27 RX ORDER — HYDROMORPHONE HYDROCHLORIDE 1 MG/ML
0.2 INJECTION, SOLUTION INTRAMUSCULAR; INTRAVENOUS; SUBCUTANEOUS EVERY 5 MIN PRN
Status: DISCONTINUED | OUTPATIENT
Start: 2023-10-27 | End: 2023-10-27

## 2023-10-27 RX ORDER — LIDOCAINE HYDROCHLORIDE 10 MG/ML
INJECTION, SOLUTION EPIDURAL; INFILTRATION; INTRACAUDAL; PERINEURAL AS NEEDED
Status: DISCONTINUED | OUTPATIENT
Start: 2023-10-27 | End: 2023-10-27 | Stop reason: SURG

## 2023-10-27 RX ORDER — MIDAZOLAM HYDROCHLORIDE 1 MG/ML
INJECTION INTRAMUSCULAR; INTRAVENOUS AS NEEDED
Status: DISCONTINUED | OUTPATIENT
Start: 2023-10-27 | End: 2023-10-27 | Stop reason: SURG

## 2023-10-27 RX ORDER — CEFAZOLIN SODIUM/WATER 2 G/20 ML
2 SYRINGE (ML) INTRAVENOUS ONCE
Status: COMPLETED | OUTPATIENT
Start: 2023-10-27 | End: 2023-10-27

## 2023-10-27 RX ORDER — HYDROMORPHONE HYDROCHLORIDE 1 MG/ML
0.6 INJECTION, SOLUTION INTRAMUSCULAR; INTRAVENOUS; SUBCUTANEOUS EVERY 5 MIN PRN
Status: DISCONTINUED | OUTPATIENT
Start: 2023-10-27 | End: 2023-10-27

## 2023-10-27 RX ORDER — BETAMETHASONE SODIUM PHOSPHATE AND BETAMETHASONE ACETATE 3; 3 MG/ML; MG/ML
INJECTION, SUSPENSION INTRA-ARTICULAR; INTRALESIONAL; INTRAMUSCULAR; SOFT TISSUE AS NEEDED
Status: DISCONTINUED | OUTPATIENT
Start: 2023-10-27 | End: 2023-10-27 | Stop reason: HOSPADM

## 2023-10-27 RX ORDER — MIDAZOLAM HYDROCHLORIDE 1 MG/ML
1 INJECTION INTRAMUSCULAR; INTRAVENOUS EVERY 5 MIN PRN
Status: DISCONTINUED | OUTPATIENT
Start: 2023-10-27 | End: 2023-10-27

## 2023-10-27 RX ADMIN — EPHEDRINE SULFATE 5 MG: 50 INJECTION INTRAVENOUS at 12:40:00

## 2023-10-27 RX ADMIN — CEFAZOLIN SODIUM/WATER 2 G: 2 G/20 ML SYRINGE (ML) INTRAVENOUS at 12:15:00

## 2023-10-27 RX ADMIN — DEXAMETHASONE SODIUM PHOSPHATE 4 MG: 4 MG/ML VIAL (ML) INJECTION at 12:15:00

## 2023-10-27 RX ADMIN — LIDOCAINE HYDROCHLORIDE 50 MG: 10 INJECTION, SOLUTION EPIDURAL; INFILTRATION; INTRACAUDAL; PERINEURAL at 12:10:00

## 2023-10-27 RX ADMIN — SODIUM CHLORIDE, SODIUM LACTATE, POTASSIUM CHLORIDE, CALCIUM CHLORIDE: 600; 310; 30; 20 INJECTION, SOLUTION INTRAVENOUS at 11:55:00

## 2023-10-27 RX ADMIN — ONDANSETRON 4 MG: 2 INJECTION INTRAMUSCULAR; INTRAVENOUS at 12:15:00

## 2023-10-27 RX ADMIN — MIDAZOLAM HYDROCHLORIDE 1 MG: 1 INJECTION INTRAMUSCULAR; INTRAVENOUS at 12:00:00

## 2023-10-27 RX ADMIN — MIDAZOLAM HYDROCHLORIDE 1 MG: 1 INJECTION INTRAMUSCULAR; INTRAVENOUS at 11:55:00

## 2023-10-27 NOTE — OR PREOP
Timbo Fuentes RN at bedside with Casey Dejesus at bedside. Sts he will write a nursing communication to change consent. Current consent form to be deleted.

## 2023-10-27 NOTE — DISCHARGE INSTRUCTIONS
Dressing: Keep the dressing on until postop day 5. Keep dressing/incision covered and dry while showering. Once dressing comes off, keep incision covered with band aid until follow up. Weight Bearing: No lifting greater than 1 pounds. Activity: Resume your normal activities of daily living within the 1 pound lifting restriction. Pain: Ice and elevate extremity to minimize swelling. Take acetaminophen and ibuprofen for pain. Take Norco 325-5mg for breakthrough pain. Follow-up: Call for follow-up appointment if you do not have one.

## 2023-10-27 NOTE — OR PREOP
Spoke to [facility], informed Dr Kimmy Jane to write a nursing communication to change consent form.

## 2023-10-27 NOTE — ANESTHESIA PROCEDURE NOTES
Regional Block    Date/Time: 10/27/2023 12:05 PM    Performed by: Shanice Hummel MD  Authorized by: Shanice Hummel MD      General Information and Staff    Start Time:  10/27/2023 12:05 PM  End Time:  10/27/2023 12:08 PM  Anesthesiologist:  Shanice Hummel MD  Performed by: Anesthesiologist  Patient Location:  OR      Site Identification: real time ultrasound guided and image stored and retrievable    Block site/laterality marked before start: site marked  Reason for Block: at surgeon's request and post-op pain management    Preanesthetic Checklist: 2 patient identifers, IV checked, site marked, risks and benefits discussed, monitors and equipment checked, pre-op evaluation, timeout performed, anesthesia consent, sterile technique used, no prohibitive neurological deficits and no local skin infection at insertion site      Procedure Details    Patient Position:  Supine  Prep: ChloraPrep    Monitoring:  Cardiac monitor, continuous pulse ox and blood pressure cuff  Block Type:  Supraclavicular  Laterality:  Right  Injection Technique:  Single-shot    Needle    Needle Type:  Short-bevel and echogenic  Needle Gauge:  21 G  Needle Length:  100 mm  Needle Localization:  Ultrasound guidance  Reason for Ultrasound Use: appropriate spread of the medication was noted in real time and no ultrasound evidence of intravascular and/or intraneural injection            Assessment    Injection Assessment:  Good spread noted, negative resistance, negative aspiration for heme, incremental injection, low pressure, local visualized surrounding nerve on ultrasound and no pain on injection  Paresthesia Pain:  None  Heart Rate Change: No    - Patient tolerated block procedure well without evidence of immediate block related complications.      Medications  10/27/2023 12:05 PM      Additional Comments    Medication:  Ropivacaine 0.5% 25mL

## 2023-10-29 NOTE — OPERATIVE REPORT
Operative Note    Patient Name: Mariam Martinez    10/27/2023    Preoperative Diagnosis:     Right FCR tendonitis  [M77.8]  Carpal tunnel syndrome of right wrist [G56.01]  Cubital tunnel syndrome on right [G56.21]  Triggering of the left index, middle and ring finger    Postoperative Diagnosis:     Right FCR tendonitis  [M77.8]  Carpal tunnel syndrome of right wrist [G56.01]  Cubital tunnel syndrome on right [G56.21]  Triggering of the left index, middle and ring finger    Surgeon(s) and Role:     Janie Tinajero MD - Primary     Assistant: Sedrick Lindsey SA  A PA was needed for the successful completion of this case. She was essential for the proper positioning of patient, manipulation of instruments, proper exposure, manipulation of soft tissue, and wound closure. Procedures:     Right cubital tunnel release in situ (CPT 25164)  Revision carpal tunnel release with nerve wrap (CPT 21567, Modifier 22, 50 % increased time due to scar tissue and need to wrap median nerve and perform neurolysis)  Neurolysis of median nerve  Right wrist FCR debridement (CPT 78030)  Corticosteroid injection of the left index, middle and ring finger to the A1 pulley    Antibiotics: Ancef 2 g    Surgical Findings: Adhesions of the median nerve to the carpal tunnel, adhesion of FCR tendon in the tunnel, fraying and tendonotic FCR tendon, subtle hourglass deformity of the ulnar nerve at the FCU heads, tight arcade of [place]    Anesthesia: MAC + Regional    Complications: None    Implants: None    Specimen: None    Condition: Stable    Estimated Blood Loss:  15 mL    Indications:  61year old female with recurrent carpal tunnel syndrome, cubital tunnel syndrome and continued pain after FCR debridement.  Patient consented to a right cubital tunnel release, revision carpal tunnel release with nerve wrapping, and repeat FCR debridement after having understood the risks associated with surgery, expected outcome, time to recovery and the need for possible rehab. Risks that were discussed but not limited to infection, nerve injury, tendon injury, artery injury, need for additional surgery, and no improvements of present symptoms. Procedure:  Patient was met in the preoperative holding area where consent was verified, laterality was marked with the surgeon's initials, and the H&P was updated. Patient was brought to the operating room on a transport cart. Patient was then transferred onto the operating room table and placed in supine position with an arm table and with bony prominences well-padded. SCDs were placed. Antibiotics were fully infused. The arm was then prepped and draped in the usual sterile fashion. A surgical timeout was performed. A sterile upper arm tourniquet was placed and the limb was exsanguinated using Esmarch bandage. Tourniquet was raised to 250mmHg. 1% lidocaine with epi was infiltrated along the medial aspect of elbow along the planned incision. A 15 blade was used to make an incision along the radial border of the ring finger with the distal extent being the hook of the hamate and the proximal extent being 2 cm proximal to the pisiform. 15 blade was used to make an incision through the dermis. A Ray-Lindy was used to perform blunt dissection onto the palmar fascia. Palmar fascia was incised longitudinally. Blunt dissection was once again used to identify the transverse carpal ligament which was then divided longitudinally along the entirety of its length. A thin transverse carpal ligament was noted. The soft tissue was dissected off the proximal edge of transverse carpal ligament and antebrachial fascia superficial and deep to it using MOUSTAPHA Bass. A 15 blade was used to divide the antebrachial fascia. The median nerve was identified and adhesions of the median nerve to the radial leaflet of the transverse carpal ligament was noted.   Alejandro scissors were used to dissect the adhesions of the median nerve off of the transverse carpal ligament. Once the neurolysis was completed. The median nerve was mobilized by releasing soft tissue attachment circumferentially. Axogen nerve wrap was used to wrap the median nerve. The nerve wrap was trimmed and the leaflets were sutured together loosely with a running 6 oh blue Prolene. The running suture was left along the ulnar aspect ulnar pf the median nerve. A 15 blade was used to make an incision through the previous incision for the FCR debridement. Incision was carried to the dermis. Adson Alejandro scissors were used to dissect down through the subcutaneous tissue onto the FCR tendon sheath. The FCR tendon sheath was incised. The FCR tendon was retracted out of the wound. The FCR tendon lacked adequate excursion secondary to adhesions within the flexor tendon tunnel. The flexor tendon tunnel was decompressed past scaphoid tubercle. Bennett Flavin was used to release the FCR tendon adhesions resulting in normal flexor tendon excursion. The FCR tendon was noted to be frayed and tendon-otic. The FCR tendon was debrided sharply with a 15 blade and Alejandro scissors. A 15 blade was used to make incision through the dermis to the subcutaneous tissue. Bipolar cautery was used to cauterize any small crossing vessels. Adson and alejandro scissors were used to dissect through the subcutaneous tissue until wagner fascia was identified. Wagner fascia was divided and the ulnar was identified. Any fascial elements compressing the nerve proximally were identified and divided. The nerve was then traced distally, dividing fascial elements compressing the nerve. The fascia overlying the FCU was isolated from the FCU muscle underneath with alejandro scissors. The fascia was then divided. Jay Austinchutz scissor was used to spread the FCU muscle fiber. a tight arcade of [place] was identified and a counterincision along the medial aspect of the arm was made.   Alejandro's were used to dissect through subcutaneous tissue and Javon scissors were used to decompress the arcade of [place]. The nerve was fully decompressed with no identifiable compressive points. The elbow was fully flexed and extended with no subluxation noted. The tourniquet was then lowered and bipolar cautery was used to achieve hemostasis. 6 mg of betamethasone and 1 mL of 1% lidocaine was injected into the A1 pulley of the left index, middle and ring finger after the skin was prepped with alcohol. Closure: The cubital tunnel release incision was closed using deep 3-0 vicryl and superficial 4-0 Monocryl in a buried simple interrupted fashion. 4-0 Monocryl was used in a subcuticular fashion. Exofilm skin glue was applied and Steri-Strips were placed. 4-0 nylon was used to reapproximate the palmar incisions    Dressing/Splint:  Bacitracin, Adaptic, 4 x 4 gauze and an ace wrap was placed over the hand/wrist and elbow for gentle compression and swelling control. Patient was placed in a sling. Post Operative:  Patient was woken up from anesthesia and taken to PACU for further recovery and discharge home. Rick Pimentel MD  Hand, Wrist, & Elbow Surgery  [facility]  Formerly [facility] [address], [address] [facility], [address] [place] [address]  [email or web address]  t: [id number]  f: [phone]

## 2023-11-06 ENCOUNTER — TELEPHONE (OUTPATIENT)
Dept: INTERNAL MEDICINE CLINIC | Facility: CLINIC | Age: 63
End: 2023-11-06

## 2023-11-06 ENCOUNTER — OFFICE VISIT (OUTPATIENT)
Dept: ORTHOPEDICS CLINIC | Facility: CLINIC | Age: 63
End: 2023-11-06
Payer: COMMERCIAL

## 2023-11-06 VITALS — BODY MASS INDEX: 29.32 KG/M2 | HEIGHT: 65 IN | WEIGHT: 176 LBS

## 2023-11-06 DIAGNOSIS — G89.29 OTHER CHRONIC PAIN: ICD-10-CM

## 2023-11-06 DIAGNOSIS — G56.01 CARPAL TUNNEL SYNDROME OF RIGHT WRIST: Primary | ICD-10-CM

## 2023-11-06 PROCEDURE — 3008F BODY MASS INDEX DOCD: CPT | Performed by: PHYSICIAN ASSISTANT

## 2023-11-06 PROCEDURE — 99024 POSTOP FOLLOW-UP VISIT: CPT | Performed by: PHYSICIAN ASSISTANT

## 2023-11-06 RX ORDER — HYDROCODONE BITARTRATE AND ACETAMINOPHEN 10; 325 MG/1; MG/1
1 TABLET ORAL EVERY 8 HOURS PRN
Qty: 90 TABLET | Refills: 0 | Status: CANCELLED | OUTPATIENT
Start: 2023-11-06

## 2023-11-06 RX ORDER — CEFADROXIL 500 MG/1
500 CAPSULE ORAL 2 TIMES DAILY
Qty: 14 CAPSULE | Refills: 0 | Status: SHIPPED | OUTPATIENT
Start: 2023-11-06

## 2023-11-06 NOTE — TELEPHONE ENCOUNTER
Patient called to request refill on     HYDROcodone-acetaminophen [facility] AND [facility])  MG Oral Tab    [facility] #[id number] [place], [place] - [address]  AT [address] [place] [address], [phone], [phone]     Patient stated following surgery she took this medication for pain as the surgeon did not prescribe anything for the pain. Please advise.     FYI - with patient's new insurance she states PA is needed for this medication  Prior Authorization # [id number]

## 2023-11-06 NOTE — TELEPHONE ENCOUNTER
Wash Sensor, please start the PA for pt's [place] and double check the medical card? Ty! Spoke to pt - she did NOT  the 20 tablets of Norco ordered by Dr. Carlene Williamson on 10/27/23 following the carpel tunnel surgery. Pt only gets the [place] from  and is due for next refill by 11/9/23. Her new insurance began 11/1/23 and requires prior authorization for this medication at #[phone]. RN informed pt we will get back to her asap, pt does not want to run out. Pt also stated that when she checked the medical marijuana web site, it still says awaiting certification. Previous message from 10/23/23 documents that  completed his part on 10/26/23. Pt wants us to double-check because someone called her for her SSN.

## 2023-11-06 NOTE — TELEPHONE ENCOUNTER
Counseling and Referral of Other Preventative  (Italic type indicates deductible and co-insurance are waived)    Patient Name: Andi Rae  Today's Date: 6/20/2022    Health Maintenance       Date Due Completion Date    COVID-19 Vaccine (4 - Booster for Pfizer series) 03/05/2022 11/5/2021    Hemoglobin A1c 09/23/2022 3/23/2022    Foot Exam 09/30/2022 9/30/2021    Lipid Panel 12/20/2022 12/20/2021    Eye Exam 01/21/2023 1/21/2022    Diabetes Urine Screening 03/23/2023 3/23/2022    Colorectal Cancer Screening 07/07/2026 7/7/2021    TETANUS VACCINE 02/01/2029 2/1/2019        No orders of the defined types were placed in this encounter.    The following information is provided to all patients.  This information is to help you find resources for any of the problems found today that may be affecting your health:                Living healthy guide: www.[facility].[place].[facility]      Understanding Diabetes: www.diabetes.org      Eating healthy: www.cdc.gov/healthyweight      [facility] home safety checklist: www.cdc.gov/steadi/patient.html      Agency on Aging: www.goea.[place].[facility]      Alcoholics anonymous (AA): www.aa.org      Physical Activity: www.levi.nih.gov/of3akll      Tobacco use: www.quitwithusla.org      Attempted to call pt back [phone], CYNDI in regards to her request, more information is needed, asked pt to return call.      Sent staff message to Ortho to ask if they can assist with this request.

## 2023-11-07 NOTE — TELEPHONE ENCOUNTER
Pt would like to F/U on this medication , looks like its been approved but she said she called her pharmacy and they don't have it yet.

## 2023-11-08 ENCOUNTER — PATIENT MESSAGE (OUTPATIENT)
Dept: ORTHOPEDICS CLINIC | Facility: CLINIC | Age: 63
End: 2023-11-08

## 2023-11-08 DIAGNOSIS — G56.01 CARPAL TUNNEL SYNDROME OF RIGHT WRIST: Primary | ICD-10-CM

## 2023-11-08 NOTE — TELEPHONE ENCOUNTER
LOV 11/6/23  DOS 10/27/23    Reports that as of this morning, still with \"oozing\" at incision site. Photo attached. Pt states has had 4 doses so far of antibiotic.

## 2023-11-08 NOTE — TELEPHONE ENCOUNTER
From: Lazaro Cavazos  To: Verlinda Lennox  Sent: 11/8/2023 8:28 AM CST  Subject: Wrist    I took a pic this morning, still oozing

## 2023-11-09 RX ORDER — HYDROCODONE BITARTRATE AND ACETAMINOPHEN 10; 325 MG/1; MG/1
1 TABLET ORAL EVERY 8 HOURS PRN
Qty: 90 TABLET | Refills: 0 | Status: SHIPPED | OUTPATIENT
Start: 2023-11-09

## 2023-11-10 ENCOUNTER — TELEPHONE (OUTPATIENT)
Dept: ORTHOPEDICS CLINIC | Facility: CLINIC | Age: 63
End: 2023-11-10

## 2023-11-10 NOTE — TELEPHONE ENCOUNTER
Patient dropped off Temporary Disability medical record to [facility] office. The form was sent to the forms department. Patient stated she needs this form back dated for the months of August and September because she retired October 1.

## 2023-11-10 NOTE — TELEPHONE ENCOUNTER
Prior authorization approved Case ID: [id number]      Payer: Talita Matamoros 19    [phone]    [phone]   CaseId:[id number];Status:Approved; Review Type:Prior Auth; Coverage Start Date:10/10/2023; Coverage End Date:11/08/2024;    Approval Details    Authorized from October 10, 2023 to November 8, 2024        Pharmacy and patient notified

## 2023-11-14 RX ORDER — SULFAMETHOXAZOLE AND TRIMETHOPRIM 400; 80 MG/1; MG/1
1 TABLET ORAL 2 TIMES DAILY
Qty: 14 TABLET | Refills: 0 | Status: SHIPPED | OUTPATIENT
Start: 2023-11-14

## 2023-12-07 DIAGNOSIS — G89.29 OTHER CHRONIC PAIN: ICD-10-CM

## 2023-12-07 NOTE — TELEPHONE ENCOUNTER
Patient called to request refill on     HYDROcodone-acetaminophen [facility] AND [facility])  MG Oral Tab    [facility] #[id number] [facility], [place] - [address] AT [address] [place] [address] Carolin OZUNA, [phone], [phone]

## 2023-12-08 NOTE — TELEPHONE ENCOUNTER
LOV: 8/3/23   RTC: none noted   Filled: 11/9/23 # 90 0 refills   Labs: 4/25/23   Future Appointments   Date Time Provider Christelle Engel   1/8/2024  8:00 AM LULA [facility] [id number]

## 2023-12-11 RX ORDER — HYDROCODONE BITARTRATE AND ACETAMINOPHEN 10; 325 MG/1; MG/1
1 TABLET ORAL EVERY 8 HOURS PRN
Qty: 90 TABLET | Refills: 0 | Status: SHIPPED | OUTPATIENT
Start: 2023-12-11

## 2024-01-05 DIAGNOSIS — G89.29 OTHER CHRONIC PAIN: ICD-10-CM

## 2024-01-05 NOTE — TELEPHONE ENCOUNTER
Pt called to request a refill for:    HYDROcodone-acetaminophen (NORCO)  MG Oral Tab     [facility] #[id number] - [place], [place] - [address] AT [facility] [address], [phone], [phone]

## 2024-01-08 ENCOUNTER — TELEPHONE (OUTPATIENT)
Dept: ORTHOPEDICS CLINIC | Facility: CLINIC | Age: 64
End: 2024-01-08

## 2024-01-08 RX ORDER — HYDROCODONE BITARTRATE AND ACETAMINOPHEN 10; 325 MG/1; MG/1
1 TABLET ORAL EVERY 8 HOURS PRN
Qty: 90 TABLET | Refills: 0 | Status: SHIPPED | OUTPATIENT
Start: 2024-01-08

## 2024-01-10 ENCOUNTER — TELEPHONE (OUTPATIENT)
Dept: ORTHOPEDICS CLINIC | Facility: CLINIC | Age: 64
End: 2024-01-10

## 2024-01-10 DIAGNOSIS — G56.22 CUBITAL TUNNEL SYNDROME ON LEFT: ICD-10-CM

## 2024-01-10 DIAGNOSIS — G56.02 CARPAL TUNNEL SYNDROME OF LEFT WRIST: Primary | ICD-10-CM

## 2024-01-10 NOTE — TELEPHONE ENCOUNTER
Called and spoke with Radha in regards to her question on being scheduled for surgery on 2/16/24. I did inform her that as of now we do not have her scheduled for surgery on this date nor do we have any information to get her scheduled for surgery on this date. I did inform her that I would send a message to Dr. Dawn to confirm this date with him and to have him send over her surgical plan. I let her know that once I receive a response I will give her a call back to confirm. She states understanding with no further questions or concerns.     Please advise Dr. Dawn?

## 2024-01-10 NOTE — TELEPHONE ENCOUNTER
Date of Surgery: 24       Post Op Appt: 24 @0900    Case ID: [id number]    Notes:         SURGICAL BOOKING SHEET   Name: Radha Jones  MRN: [id number]   : 1960     Surgical Date:    24   Surgical Consent:    Left endoscopic carpal tunnel is possible open, left cubital tunnel release   Diagnosis:         ICD-10-CM   1. Carpal tunnel syndrome of right wrist  G56.01   2. Carpal tunnel syndrome of left wrist  G56.02   3. Cubital tunnel syndrome on left  G56.22       Procedure Codes:    Endoscopic carpal tunnel release (CPT 59328)  Cubital tunnel release (CPT 16954   Disposition:    Outpatient   Operative Time:    45 mins   Antibiotics:    Ancef 2g   Anesthesia Type:    Monitored Anesthesia Care (MAC) and Regional -supraclavicular   Clearance:     NONE   Equipment:    ECTR: [place] Blade, Microaire Endoscopic System, sterile tourniquet, 3-0 Monocryl, 4-0 Monocryl, 5-0 moncryl, Exofin, Telfa+Tegaderm   Standby: Lead Hand, 4-0 Nylon PS-2   Assistant:    López Assistant: Yes, Esme Terrell PA-C   Follow Up:    7-10 days post op with Esme Terrell   Pain Medication:    Norco 325-5mg   Therapy:    None

## 2024-01-12 NOTE — TELEPHONE ENCOUNTER
Called and spoke with Radha in regards to her upcoming surgery. I did confirm that she will be having surgery at [facility]. I did also discuss the surgical protocol and post op appt date and time with her. She states understanding with no questions or concerns.

## 2024-01-31 DIAGNOSIS — I10 ESSENTIAL HYPERTENSION: Chronic | ICD-10-CM

## 2024-02-01 DIAGNOSIS — I10 ESSENTIAL HYPERTENSION: Chronic | ICD-10-CM

## 2024-02-01 RX ORDER — METOPROLOL SUCCINATE 50 MG/1
50 TABLET, EXTENDED RELEASE ORAL DAILY
Qty: 30 TABLET | Refills: 0 | Status: SHIPPED | OUTPATIENT
Start: 2024-02-01

## 2024-02-01 NOTE — TELEPHONE ENCOUNTER
LOV 10/16/23   RTC: no follow ups on file   Filled: 7/25/23 #90 1 refill   Labs: 10/24/23   Future Appointments   Date Time Provider Department Center   2/5/2024  7:15 AM [facility] [place]   2/5/2024  8:00 AM [facility] [place]   2/9/2024 11:20 AM Esme Rainey PA [facility] Wo [id number]   2/23/2024  9:00 AM Esme Rainey PA [facility] Wo [id number]

## 2024-02-02 RX ORDER — METOPROLOL SUCCINATE 50 MG/1
50 TABLET, EXTENDED RELEASE ORAL DAILY
Qty: 90 TABLET | Refills: 0 | OUTPATIENT
Start: 2024-02-02

## 2024-02-07 DIAGNOSIS — G89.29 OTHER CHRONIC PAIN: ICD-10-CM

## 2024-02-07 NOTE — TELEPHONE ENCOUNTER
HYDROcodone-acetaminophen (NORCO)  MG     [facility] #[id number] - [place], [place] - [address] AT [facility] [address], [phone], [phone]

## 2024-02-08 NOTE — TELEPHONE ENCOUNTER
LOV: 10/16/23   RTC: none noted   Filled: 1/8/24 #90   Future Appointments   Date Time Provider Department Center   2/23/2024  9:00 AM [id number]

## 2024-02-13 ENCOUNTER — EKG ENCOUNTER (OUTPATIENT)
Dept: LAB | Age: 64
End: 2024-02-13
Attending: ORTHOPAEDIC SURGERY
Payer: COMMERCIAL

## 2024-02-13 ENCOUNTER — LAB ENCOUNTER (OUTPATIENT)
Dept: LAB | Age: 64
End: 2024-02-13
Attending: ORTHOPAEDIC SURGERY
Payer: COMMERCIAL

## 2024-02-13 DIAGNOSIS — Z01.818 PRE-OP TESTING: ICD-10-CM

## 2024-02-13 LAB
ANION GAP SERPL CALC-SCNC: 3 MMOL/L (ref 0–18)
ATRIAL RATE: 56 BPM
BUN BLD-MCNC: 15 MG/DL (ref 9–23)
CALCIUM BLD-MCNC: 9 MG/DL (ref 8.5–10.1)
CHLORIDE SERPL-SCNC: 110 MMOL/L (ref 98–112)
CO2 SERPL-SCNC: 28 MMOL/L (ref 21–32)
CREAT BLD-MCNC: 0.89 MG/DL
EGFRCR SERPLBLD CKD-EPI 2021: 73 ML/MIN/1.73M2 (ref 60–?)
GLUCOSE BLD-MCNC: 102 MG/DL (ref 70–99)
OSMOLALITY SERPL CALC.SUM OF ELEC: 293 MOSM/KG (ref 275–295)
P AXIS: 88 DEGREES
P-R INTERVAL: 116 MS
POTASSIUM SERPL-SCNC: 4.2 MMOL/L (ref 3.5–5.1)
Q-T INTERVAL: 460 MS
QRS DURATION: 88 MS
QTC CALCULATION (BEZET): 443 MS
R AXIS: 18 DEGREES
SODIUM SERPL-SCNC: 141 MMOL/L (ref 136–145)
T AXIS: 25 DEGREES
VENTRICULAR RATE: 56 BPM

## 2024-02-13 PROCEDURE — 93010 ELECTROCARDIOGRAM REPORT: CPT | Performed by: INTERNAL MEDICINE

## 2024-02-13 PROCEDURE — 93005 ELECTROCARDIOGRAM TRACING: CPT

## 2024-02-13 PROCEDURE — 80048 BASIC METABOLIC PNL TOTAL CA: CPT

## 2024-02-13 PROCEDURE — 36415 COLL VENOUS BLD VENIPUNCTURE: CPT

## 2024-02-13 RX ORDER — HYDROCODONE BITARTRATE AND ACETAMINOPHEN 10; 325 MG/1; MG/1
1 TABLET ORAL EVERY 8 HOURS PRN
Qty: 90 TABLET | Refills: 0 | Status: SHIPPED | OUTPATIENT
Start: 2024-02-13

## 2024-02-15 ENCOUNTER — OFFICE VISIT (OUTPATIENT)
Dept: INTERNAL MEDICINE CLINIC | Facility: CLINIC | Age: 64
End: 2024-02-15
Payer: COMMERCIAL

## 2024-02-15 VITALS
SYSTOLIC BLOOD PRESSURE: 124 MMHG | BODY MASS INDEX: 30.16 KG/M2 | TEMPERATURE: 98 F | OXYGEN SATURATION: 98 % | HEIGHT: 65 IN | HEART RATE: 67 BPM | DIASTOLIC BLOOD PRESSURE: 80 MMHG | WEIGHT: 181 LBS | RESPIRATION RATE: 14 BRPM

## 2024-02-15 DIAGNOSIS — G56.40 COMPLEX REGIONAL PAIN SYNDROME TYPE 2, AFFECTING UNSPECIFIED SITE: Primary | Chronic | ICD-10-CM

## 2024-02-15 PROCEDURE — 99214 OFFICE O/P EST MOD 30 MIN: CPT | Performed by: INTERNAL MEDICINE

## 2024-02-15 NOTE — PATIENT INSTRUCTIONS
Patient advised to call up the number who informed her that her surgery has been canceled and get more details and to call Dr. Dawn's office to find out the exact status of her surgery.  Also to find out what requirements are needed preoperatively from his office.  Total time spent 25 minutes trying to figure out the exact sequence of events

## 2024-02-15 NOTE — PROGRESS NOTES
Radha Jones  1960 is a 63 year old female.    Chief Complaint   Patient presents with    Pre-Op Exam     Here apparently was scheduled to have a carpal tunnel surgery.  Someone unknown to her called her that she needs to have medical clearance however there is no note as such requested from the performing physician.  Subsequently the patient got a call from somewhere that the surgery was canceled because there was a switch on her insurance .  Patient unsure whether she is having surgery and not having surgery.  HPI:   Patient essentially here for verification and discussion about her medicines.  Patient was a former recipient of the [place] medical marijuana card however that got  patient never renewed it on  line  Patient now looking for other renewal of her medical marijuana card.  Will use this visit to certify her marijuana card this being a prerequisite  Current Outpatient Medications   Medication Sig Dispense Refill    HYDROcodone-acetaminophen (NORCO)  MG Oral Tab Take 1 tablet by mouth every 8 (eight) hours as needed for Pain. 90 tablet 0    metoprolol succinate ER 50 MG Oral Tablet 24 Hr TAKE 1 TABLET(50 MG) BY MOUTH DAILY 30 tablet 0    ondansetron (ZOFRAN) 4 mg tablet Take 1 tablet (4 mg total) by mouth every 8 (eight) hours as needed for Nausea. 20 tablet 0    lidocaine 5 % External Patch Place 1 patch onto the skin daily. 30 patch 0    atorvastatin 20 MG Oral Tab Take 1 tablet (20 mg total) by mouth nightly. 90 tablet 3    potassium chloride 20 MEQ Oral Tab CR Take 1 tablet (20 mEq total) by mouth daily. 90 tablet 2    valACYclovir 1 G Oral Tab Take 1 tablet (1,000 mg total) by mouth 2 (two) times a day. (Patient taking differently: Take 1 tablet (1,000 mg total) by mouth as needed.) 180 tablet 0    Penciclovir (DENAVIR) 1 % External Cream Apply 1 Application topically every 2 (two) hours as needed. 5 g 0    Calcium Polycarbophil (FIBER) 625 MG Oral Tab Take by mouth.       Naloxone HCl 4 MG/0.1ML Nasal Liquid 4 mg by Nasal route as needed. If patient remains unresponsive, repeat dose in other nostril 2-5 minutes after first dose. 1 kit 0      Past Medical History:   Diagnosis Date    Arthritis     Back pain     Calculus of kidney     Carpal tunnel syndrome 06/20/2013    Log Date: 11/28/2012     Cervical stenosis of spine     COPD (chronic obstructive pulmonary disease) (HCC)     Degeneration of lumbar or lumbosacral intervertebral disc 09/25/2013    DJD (degenerative joint disease) of hip     Bilateral     Esophageal reflux     Essential hypertension     Follicular cyst of ovary     left    Heartburn     High blood pressure     High cholesterol     Hx of motion sickness     Hyperlipidemia     Leg swelling     Migraines     Neuropathy     LUQ    Opiate use 07/08/2016    Osteoporosis 02/18/2014    Other chronic pain 05/01/2017    Presence of other cardiac implants and grafts     Recurrent pneumonia     Renal stones     Rib fracture 06/30/2013    Sciatica 09/25/2013    Spinal stenosis, lumbar region, without neurogenic claudication 09/25/2013    Trigger finger of right thumb 04/05/2017    Trigger finger, left middle finger 04/05/2017    Trigger finger, right index finger 04/05/2017    Trigger finger, right ring finger 04/05/2017    Varicose vein     Ventral hernia 2002    Visual impairment     glasses    Wears glasses       Social History:  Social History     Socioeconomic History    Marital status:    Tobacco Use    Smoking status: Former     Packs/day: 1.00     Years: 32.00     Additional pack years: 0.00     Total pack years: 32.00     Types: Cigarettes     Quit date: 3/29/2008     Years since quitting: 15.8    Smokeless tobacco: Never   Vaping Use    Vaping Use: Never used   Substance and Sexual Activity    Alcohol use: No     Alcohol/week: 0.0 standard drinks of alcohol    Drug use: No    Sexual activity: Never   Other Topics Concern    Caffeine Concern Yes     Comment: 3  cups/cans daily.     Stress Concern No    Weight Concern No    Special Diet No    Exercise No    Seat Belt Yes        REVIEW OF SYSTEMS:   na        EXAM:   /80 (BP Location: Right arm, Patient Position: Sitting, Cuff Size: adult)   Pulse 67   Temp 97.7 °F (36.5 °C) (Temporal)   Resp 14   Ht 5' 5\" (1.651 m)   Wt 181 lb (82.1 kg)   SpO2 98%   BMI 30.12 kg/m²     na      ASSESSMENT AND PLAN:   Radha was seen today for pre-op exam.    Diagnoses and all orders for this visit:    Complex regional pain syndrome type 2, affecting unspecified site      Patient has not changed significantly over the years has had numerous pain evaluations.  Patient seems to be a decent candidate for the medical marijuana card.    Patient Instructions   Patient advised to call up the number who informed her that her surgery has been canceled and get more details and to call Dr. Dawn's office to find out the exact status of her surgery.  Also to find out what requirements are needed preoperatively from his office.  Total time spent 25 minutes trying to figure out the exact sequence of events   The patient indicates understanding of these issues and agrees to the plan.  The patient is asked to No follow-ups on file.  .      Julian Alanis MD

## 2024-02-16 ENCOUNTER — TELEPHONE (OUTPATIENT)
Dept: INTERNAL MEDICINE CLINIC | Facility: CLINIC | Age: 64
End: 2024-02-16

## 2024-02-16 ENCOUNTER — ANESTHESIA EVENT (OUTPATIENT)
Dept: SURGERY | Facility: HOSPITAL | Age: 64
End: 2024-02-16
Payer: COMMERCIAL

## 2024-02-16 ENCOUNTER — TELEPHONE (OUTPATIENT)
Facility: LOCATION | Age: 64
End: 2024-02-16

## 2024-02-16 ENCOUNTER — ANESTHESIA (OUTPATIENT)
Dept: SURGERY | Facility: HOSPITAL | Age: 64
End: 2024-02-16
Payer: COMMERCIAL

## 2024-02-16 ENCOUNTER — TELEPHONE (OUTPATIENT)
Dept: ORTHOPEDICS CLINIC | Facility: CLINIC | Age: 64
End: 2024-02-16

## 2024-02-16 ENCOUNTER — HOSPITAL ENCOUNTER (OUTPATIENT)
Facility: HOSPITAL | Age: 64
Setting detail: HOSPITAL OUTPATIENT SURGERY
Discharge: HOME | End: 2024-02-16
Attending: ORTHOPAEDIC SURGERY | Admitting: ORTHOPAEDIC SURGERY
Payer: COMMERCIAL

## 2024-02-16 VITALS
DIASTOLIC BLOOD PRESSURE: 72 MMHG | HEART RATE: 77 BPM | OXYGEN SATURATION: 90 % | WEIGHT: 182 LBS | HEIGHT: 65 IN | RESPIRATION RATE: 16 BRPM | BODY MASS INDEX: 30.32 KG/M2 | TEMPERATURE: 98 F | SYSTOLIC BLOOD PRESSURE: 122 MMHG

## 2024-02-16 DIAGNOSIS — G56.03 BILATERAL CARPAL TUNNEL SYNDROME: ICD-10-CM

## 2024-02-16 DIAGNOSIS — Z01.818 PRE-OP TESTING: Primary | ICD-10-CM

## 2024-02-16 DIAGNOSIS — M65.332 TRIGGER MIDDLE FINGER OF LEFT HAND: ICD-10-CM

## 2024-02-16 DIAGNOSIS — G56.02 CARPAL TUNNEL SYNDROME OF LEFT WRIST: Primary | ICD-10-CM

## 2024-02-16 DIAGNOSIS — G56.22 CUBITAL TUNNEL SYNDROME ON LEFT: ICD-10-CM

## 2024-02-16 PROCEDURE — 01N54ZZ RELEASE MEDIAN NERVE, PERCUTANEOUS ENDOSCOPIC APPROACH: ICD-10-PCS | Performed by: ORTHOPAEDIC SURGERY

## 2024-02-16 PROCEDURE — 01N40ZZ RELEASE ULNAR NERVE, OPEN APPROACH: ICD-10-PCS | Performed by: ORTHOPAEDIC SURGERY

## 2024-02-16 PROCEDURE — 0LN80ZZ RELEASE LEFT HAND TENDON, OPEN APPROACH: ICD-10-PCS | Performed by: ORTHOPAEDIC SURGERY

## 2024-02-16 RX ORDER — ACETAMINOPHEN 500 MG
1000 TABLET ORAL ONCE AS NEEDED
Status: DISCONTINUED | OUTPATIENT
Start: 2024-02-16 | End: 2024-02-16

## 2024-02-16 RX ORDER — HYDROMORPHONE HYDROCHLORIDE 1 MG/ML
0.4 INJECTION, SOLUTION INTRAMUSCULAR; INTRAVENOUS; SUBCUTANEOUS EVERY 5 MIN PRN
Status: DISCONTINUED | OUTPATIENT
Start: 2024-02-16 | End: 2024-02-16

## 2024-02-16 RX ORDER — ACETAMINOPHEN 500 MG
1000 TABLET ORAL ONCE
Status: DISCONTINUED | OUTPATIENT
Start: 2024-02-16 | End: 2024-02-16 | Stop reason: HOSPADM

## 2024-02-16 RX ORDER — CEFAZOLIN SODIUM/WATER 2 G/20 ML
2 SYRINGE (ML) INTRAVENOUS ONCE
Status: COMPLETED | OUTPATIENT
Start: 2024-02-16 | End: 2024-02-16

## 2024-02-16 RX ORDER — PROCHLORPERAZINE EDISYLATE 5 MG/ML
5 INJECTION INTRAMUSCULAR; INTRAVENOUS EVERY 8 HOURS PRN
Status: DISCONTINUED | OUTPATIENT
Start: 2024-02-16 | End: 2024-02-16

## 2024-02-16 RX ORDER — KETAMINE HYDROCHLORIDE 50 MG/ML
INJECTION, SOLUTION INTRAMUSCULAR; INTRAVENOUS AS NEEDED
Status: DISCONTINUED | OUTPATIENT
Start: 2024-02-16 | End: 2024-02-16 | Stop reason: SURG

## 2024-02-16 RX ORDER — HYDROMORPHONE HYDROCHLORIDE 1 MG/ML
0.2 INJECTION, SOLUTION INTRAMUSCULAR; INTRAVENOUS; SUBCUTANEOUS EVERY 5 MIN PRN
Status: DISCONTINUED | OUTPATIENT
Start: 2024-02-16 | End: 2024-02-16

## 2024-02-16 RX ORDER — ONDANSETRON 2 MG/ML
4 INJECTION INTRAMUSCULAR; INTRAVENOUS EVERY 6 HOURS PRN
Status: DISCONTINUED | OUTPATIENT
Start: 2024-02-16 | End: 2024-02-16

## 2024-02-16 RX ORDER — HYDROCODONE BITARTRATE AND ACETAMINOPHEN 10; 325 MG/1; MG/1
1 TABLET ORAL ONCE AS NEEDED
Status: DISCONTINUED | OUTPATIENT
Start: 2024-02-16 | End: 2024-02-16

## 2024-02-16 RX ORDER — SODIUM CHLORIDE, SODIUM LACTATE, POTASSIUM CHLORIDE, CALCIUM CHLORIDE 600; 310; 30; 20 MG/100ML; MG/100ML; MG/100ML; MG/100ML
INJECTION, SOLUTION INTRAVENOUS CONTINUOUS
Status: DISCONTINUED | OUTPATIENT
Start: 2024-02-16 | End: 2024-02-16

## 2024-02-16 RX ORDER — HYDROMORPHONE HYDROCHLORIDE 1 MG/ML
0.6 INJECTION, SOLUTION INTRAMUSCULAR; INTRAVENOUS; SUBCUTANEOUS EVERY 5 MIN PRN
Status: DISCONTINUED | OUTPATIENT
Start: 2024-02-16 | End: 2024-02-16

## 2024-02-16 RX ORDER — SCOLOPAMINE TRANSDERMAL SYSTEM 1 MG/1
1 PATCH, EXTENDED RELEASE TRANSDERMAL ONCE
Status: DISCONTINUED | OUTPATIENT
Start: 2024-02-16 | End: 2024-02-16 | Stop reason: HOSPADM

## 2024-02-16 RX ORDER — HYDROCODONE BITARTRATE AND ACETAMINOPHEN 10; 325 MG/1; MG/1
1 TABLET ORAL EVERY 6 HOURS PRN
Qty: 30 TABLET | Refills: 0 | Status: SHIPPED | OUTPATIENT
Start: 2024-02-16

## 2024-02-16 RX ORDER — ALBUTEROL SULFATE 2.5 MG/3ML
2.5 SOLUTION RESPIRATORY (INHALATION) AS NEEDED
Status: DISCONTINUED | OUTPATIENT
Start: 2024-02-16 | End: 2024-02-16

## 2024-02-16 RX ORDER — DEXAMETHASONE SODIUM PHOSPHATE 10 MG/ML
INJECTION, SOLUTION INTRAMUSCULAR; INTRAVENOUS AS NEEDED
Status: DISCONTINUED | OUTPATIENT
Start: 2024-02-16 | End: 2024-02-16 | Stop reason: SURG

## 2024-02-16 RX ORDER — MEPERIDINE HYDROCHLORIDE 25 MG/ML
12.5 INJECTION INTRAMUSCULAR; INTRAVENOUS; SUBCUTANEOUS AS NEEDED
Status: DISCONTINUED | OUTPATIENT
Start: 2024-02-16 | End: 2024-02-16

## 2024-02-16 RX ORDER — EPHEDRINE SULFATE 50 MG/ML
INJECTION INTRAVENOUS AS NEEDED
Status: DISCONTINUED | OUTPATIENT
Start: 2024-02-16 | End: 2024-02-16 | Stop reason: SURG

## 2024-02-16 RX ORDER — LABETALOL HYDROCHLORIDE 5 MG/ML
5 INJECTION, SOLUTION INTRAVENOUS EVERY 5 MIN PRN
Status: DISCONTINUED | OUTPATIENT
Start: 2024-02-16 | End: 2024-02-16

## 2024-02-16 RX ORDER — ONDANSETRON 2 MG/ML
INJECTION INTRAMUSCULAR; INTRAVENOUS AS NEEDED
Status: DISCONTINUED | OUTPATIENT
Start: 2024-02-16 | End: 2024-02-16 | Stop reason: SURG

## 2024-02-16 RX ORDER — MIDAZOLAM HYDROCHLORIDE 1 MG/ML
1 INJECTION INTRAMUSCULAR; INTRAVENOUS EVERY 5 MIN PRN
Status: DISCONTINUED | OUTPATIENT
Start: 2024-02-16 | End: 2024-02-16

## 2024-02-16 RX ORDER — HYDROCODONE BITARTRATE AND ACETAMINOPHEN 10; 325 MG/1; MG/1
2 TABLET ORAL ONCE AS NEEDED
Status: DISCONTINUED | OUTPATIENT
Start: 2024-02-16 | End: 2024-02-16

## 2024-02-16 RX ORDER — NALOXONE HYDROCHLORIDE 0.4 MG/ML
80 INJECTION, SOLUTION INTRAMUSCULAR; INTRAVENOUS; SUBCUTANEOUS AS NEEDED
Status: DISCONTINUED | OUTPATIENT
Start: 2024-02-16 | End: 2024-02-16

## 2024-02-16 RX ORDER — DIPHENHYDRAMINE HYDROCHLORIDE 50 MG/ML
12.5 INJECTION INTRAMUSCULAR; INTRAVENOUS AS NEEDED
Status: DISCONTINUED | OUTPATIENT
Start: 2024-02-16 | End: 2024-02-16

## 2024-02-16 RX ORDER — MIDAZOLAM HYDROCHLORIDE 1 MG/ML
INJECTION INTRAMUSCULAR; INTRAVENOUS AS NEEDED
Status: DISCONTINUED | OUTPATIENT
Start: 2024-02-16 | End: 2024-02-16 | Stop reason: SURG

## 2024-02-16 RX ADMIN — CEFAZOLIN SODIUM/WATER 2 G: 2 G/20 ML SYRINGE (ML) INTRAVENOUS at 09:35:00

## 2024-02-16 RX ADMIN — SODIUM CHLORIDE, SODIUM LACTATE, POTASSIUM CHLORIDE, CALCIUM CHLORIDE: 600; 310; 30; 20 INJECTION, SOLUTION INTRAVENOUS at 09:22:00

## 2024-02-16 RX ADMIN — EPHEDRINE SULFATE 10 MG: 50 INJECTION INTRAVENOUS at 10:19:00

## 2024-02-16 RX ADMIN — ONDANSETRON 4 MG: 2 INJECTION INTRAMUSCULAR; INTRAVENOUS at 10:34:00

## 2024-02-16 RX ADMIN — EPHEDRINE SULFATE 10 MG: 50 INJECTION INTRAVENOUS at 10:07:00

## 2024-02-16 RX ADMIN — SODIUM CHLORIDE, SODIUM LACTATE, POTASSIUM CHLORIDE, CALCIUM CHLORIDE: 600; 310; 30; 20 INJECTION, SOLUTION INTRAVENOUS at 10:36:00

## 2024-02-16 RX ADMIN — KETAMINE HYDROCHLORIDE 15 MG: 50 INJECTION, SOLUTION INTRAMUSCULAR; INTRAVENOUS at 09:25:00

## 2024-02-16 RX ADMIN — DEXAMETHASONE SODIUM PHOSPHATE 2 MG: 10 INJECTION, SOLUTION INTRAMUSCULAR; INTRAVENOUS at 09:29:00

## 2024-02-16 RX ADMIN — DEXAMETHASONE SODIUM PHOSPHATE 8 MG: 10 INJECTION, SOLUTION INTRAMUSCULAR; INTRAVENOUS at 09:34:00

## 2024-02-16 RX ADMIN — MIDAZOLAM HYDROCHLORIDE 2 MG: 1 INJECTION INTRAMUSCULAR; INTRAVENOUS at 09:25:00

## 2024-02-16 NOTE — DISCHARGE INSTRUCTIONS
Dressing: Keep the clear Tegaderm dressing on until postop day 5.  Leave the Steri-Strips in place until they fall off.  Ok to take off the ace wrap for showering but put back on after. Patient can shower with the clear Tegaderm dressing on and when it is removed. Keep hand/wrist dressing on for 5 days and then can remove and cover with bandaid. Do not get the hand wet until follow up    Weight Bearing: No lifting greater than 1 pounds.    Activity: Resume your normal activities of daily living within the 1 pound lifting restriction.    Pain: Ice and elevate extremity to minimize swelling.  Take acetaminophen and ibuprofen for pain.  For breakthrough pain take Norco 325-5 mg.    Follow-up: Call for follow-up appointment if you do not have one.

## 2024-02-16 NOTE — ANESTHESIA POSTPROCEDURE EVALUATION
[facility]    Radha Jones Patient Status:  Hospital Outpatient Surgery   Age/Gender 63 year old female MRN [id number]   Location [facility] SURGERY Attending Amilcar Dawn MD   Hosp Day # 0 PCP Julian Alanis MD       Anesthesia Post-op Note    LEFT ENDOSCOPIC CARPAL TUNNEL RELEASE, LEFT CUBITAL TUNNEL RELEASE. A1 PULLEY RELEASE OF LEFT INDEX, MIDDLE AND RING FINGER    Procedure Summary       Date: 02/16/24 Room / Location:  MAIN OR  /  MAIN OR    Anesthesia Start: 0922 Anesthesia Stop:     Procedures:       LEFT ENDOSCOPIC CARPAL TUNNEL RELEASE, LEFT CUBITAL TUNNEL RELEASE. A1 PULLEY RELEASE OF LEFT INDEX, MIDDLE AND RING FINGER (Left)      CUBITAL TUNNEL RELEASE (Left) Diagnosis:       Carpal tunnel syndrome of left wrist      Cubital tunnel syndrome on left      (Carpal tunnel syndrome of left wrist [G56.02])      (Cubital tunnel syndrome on left [G56.22])    Surgeons: Amilcar Dawn MD Anesthesiologist: Long Watkins MD    Anesthesia Type: general, regional ASA Status: 2            Anesthesia Type: general, regional    Vitals Value Taken Time   BP 93/54 02/16/24 1042   Temp 97.7 degrees F 02/16/24 1042   Pulse 80 02/16/24 1042   Resp 16 02/16/24 1042   SpO2 92% 02/16/24 1042       Patient Location: Same Day Surgery    Anesthesia Type: regional and general    Airway Patency: patent    Postop Pain Control: adequate    Mental Status: mildly sedated but able to meaningfully participate in the post-anesthesia evaluation    Nausea/Vomiting: none    Cardiopulmonary/Hydration status: stable euvolemic    Complications: no apparent anesthesia related complications    Postop vital signs: stable    Dental Exam: Unchanged from Preop    Patient to be discharged home when criteria met.

## 2024-02-16 NOTE — OR PREOP
Pt sts she has more questions for Dr Dawn regarding consent. Pt requests to hold on signing consent at this time until she speaks with Dr. Dawn. Barbi in [facility] notified that pt will be made ready for surgery in [facility] but patient has not signed consent form.     Pt confirms spinal stimulator is turned off.

## 2024-02-16 NOTE — TELEPHONE ENCOUNTER
Prior authorization approved   Case ID: [id number]      Payer: [facility]    [phone]    [phone]   Your PA request has been approved.  Additional information will be provided in the approval communication. (Message 1144)   Approval Details    Authorized from February 16, 2024 to August 14, 2024        Patient and pharmacy informed

## 2024-02-16 NOTE — ANESTHESIA PROCEDURE NOTES
Regional Block    Performed by: Long Watkins MD  Authorized by: Long Watkins MD      General Information and Staff    Start Time:   Anesthesiologist:  Long Watkins MD  Performed by:  Anesthesiologist  Patient Location:  OR    Block Placement: Post Induction  Site Identification: real time ultrasound guided, nerve stimulator and image stored and retrievable    Block site/laterality marked before start: site marked  Reason for Block: at surgeon's request and post-op pain management    Preanesthetic Checklist: 2 patient identifers, IV checked, site marked, risks and benefits discussed, monitors and equipment checked, pre-op evaluation, timeout performed, anesthesia consent, sterile technique used, no prohibitive neurological deficits and no local skin infection at insertion site      Procedure Details    Patient Position:  Supine  Prep: ChloraPrep    Monitoring:  Heart rate, cardiac monitor, continuous pulse ox and blood pressure cuff  Block Type:  Supraclavicular  Laterality:  Left  Injection Technique:  Single-shot    Needle    Needle Type:  Echogenic  Needle Gauge:  21 G  Needle Length:  100 mm  Needle Localization:  Nerve stimulator and ultrasound guidance  Reason for Ultrasound Use: appropriate spread of the medication was noted in real time and no ultrasound evidence of intravascular and/or intraneural injection    Nerve Stimulator: 0.8 amps    Muscle Twitch Response Comment: no response    Assessment    Injection Assessment:  Good spread noted, incremental injection, local visualized surrounding nerve on ultrasound, low pressure, negative aspiration for heme, no pain on injection and negative resistance  Paresthesia Pain:  None  Heart Rate Change: No    - Patient tolerated block procedure well without evidence of immediate block related complications.     Medications      Additional Comments    Ropivacaine 0.375% 30cc, decadron 2 mg PF

## 2024-02-16 NOTE — H&P
Clinic Note [facility]G Orthopedics     Assessment/Plan:  63 year old female    Left hand numbness and tingling and cramping with stiffness-would like to order an EMG for further evaluation of possible recurrent carpal tunnel syndrome.  I would also like to order an MRI but given the implant she has in her back she is not a candidate for that.  She has significant stiffness to her fingers but after moving the fingers passively she was able to make a full composite fist.  She has completed a year of therapy.  Her limitations on being able to work will be based off pain at this point.  If her EMG is negative I would discuss the possibility of an FCE.   Cubital tunnel syndrome: Brace at night time to keep elbow straight.   Possible trigger finger: Left index and left ring finger injections  CMC arthritis right side : She is on blood thinners and can't take advil and aleve. She reports that he can't take ibruprofen due to swelling. She can use voltaren and CBD for pain relief. Patient received 2 steroid injections today.     No diagnosis found.       Follow Up: 1 month     Diagnostic Studies:  X-rays are negative for acute fracture dislocation or deformity  EMG/NCV: Bilateral carpal tunnel syndrome with moderate to severe symptoms in the right hand moderate in the left.  Left ulnar mononeuropathy nonspecific    Physical Exam:    /73   Pulse 67   Temp 97.9 °F (36.6 °C) (Temporal)   Resp 16   Ht 5' 5\" (1.651 m)   Wt 182 lb (82.6 kg)   SpO2 100%   BMI 30.29 kg/m²     Constitutional: NAD. AOx3. Well-developed and Well-nourished.   Psychiatric: Normal mood/ affect/ behavior. Judgment and thought content normal.     Left upper Extremity:   Inspection: Skin Intact. No skin lesions. No gross deformity.   Palpation: Nontender palpation over hand and wrist. TTP A1 pulley    Motion: Elbow: normal bilateral symmetric ext/flex  Wrist: normal bilateral symmetric ext/flex/sup/pro  Finger: full composite fist, actively she has  very minimal motion but after working with her fingers passively she was able to hold a full composite fist.      Sensory: Positive Tinel's at the wrist.    CC: No chief complaint on file.        HPI: This 63 year old female presents with complaints of pain to her left hand.  She states there is cramping and stiffness.  Is been ongoing since April.  She has throbbing pains along the wrist extending to the fingertips.  Of note she did have bilateral carpal tunnel releases done in 2014.  She did have complete resolution of her symptoms at that point.  But last June 2022 her symptoms started of pain in the wrist as well as numbness and tingling.  She underwent FCR tenosynovectomy on her right wrist by another provider in January 2023.  She is now almost 8 months out from surgery and states she does not feel any better and still has pain along the right wrist.  Today she is requesting be seen mostly for the left hand.  She has numbness and tingling extending into the fingertips on all the fingers.  She has cramping and pain that limits her from doing her job.  She was off work following surgery on her right side and went back to work light duty.  During her light duty she was doing a lot of repetitive use where she felt like her left hand pain was becoming more of an issue.  She did have an ROSALIND more recently but it is unclear if that was for her right or left side.    Interval history: She reports that her carpal tunnel has been bothering her in her right hand. 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 She reports pain and numbness in her wrist. She is unable to do anything at home, not even hold dishes since the pain and numbness is so bad. She reports pain in both hands and both hands lock up the same.   Past Medical History:   Diagnosis Date    Arthritis     Back pain     Calculus of kidney     Carpal tunnel syndrome 06/20/2013    Log Date: 11/28/2012     Cervical stenosis of spine     COPD (chronic  obstructive pulmonary disease) (HCC)     Degeneration of lumbar or lumbosacral intervertebral disc 09/25/2013    DJD (degenerative joint disease) of hip     Bilateral     Esophageal reflux     Essential hypertension     Follicular cyst of ovary     left    Heartburn     High blood pressure     High cholesterol     Hx of motion sickness     Hyperlipidemia     Leg swelling     Migraines     Neuropathy     LUQ    Opiate use 07/08/2016    Osteoporosis 02/18/2014    Other chronic pain 05/01/2017    Presence of other cardiac implants and grafts     Recurrent pneumonia     Renal stones     Rib fracture 06/30/2013    Sciatica 09/25/2013    Spinal stenosis, lumbar region, without neurogenic claudication 09/25/2013    Trigger finger of right thumb 04/05/2017    Trigger finger, left middle finger 04/05/2017    Trigger finger, right index finger 04/05/2017    Trigger finger, right ring finger 04/05/2017    Varicose vein     Ventral hernia 2002    Visual impairment     glasses    Wears glasses      Past Surgical History:   Procedure Laterality Date    ARTHROCENTESIS ASPIR&/INJ MAJOR JT/BURSA W/US N/A 3/26/2015    Procedure: HIP INJECTION (PAIN);  Surgeon: Nima Jameson MD;  Location: [facility] PAIN MANAGEMENT    ARTHROCENTESIS ASPIR&/INJ MAJOR JT/BURSA W/US  7/30/2015    Procedure: ;  Surgeon: Nima Jameson MD;  Location: [facility] PAIN MANA[facility]EMENT    CARPAL TUNNEL RELEASE      08/2014    COLONOSCOPY  2010    COLONOSCOPY N/A 11/12/2014    Procedure: COLONOSCOPY;  Surgeon: Valentin Ramos MD;  Location:  ENDOSCOPY    D & C      DRAIN/INJECT MEDIUM JOINT/BURSA  3/8/2012    Procedure: COCCYX;  Surgeon: Karlos Barron MD;  Location: [facility] PAIN MANA[facility]EMENT    DRAIN/INJECT MEDIUM JOINT/BURSA  3/29/2012    Procedure: COCCYX;  Surgeon: Karlos Barron MD;  Location: [facility]IN [facility]NA[facility]    DRAIN/INJECT MEDIUM JOINT/BURSA  3/29/2012    Procedure: COCCYX;  Surgeon: Karlos Barron MD;  [place] [facility]    DRAIN/INJECT MEDIUM JOINT/BURSA  1/25/2013    Procedure: COCCYX;  Surgeon: Terry Torres MD;  [place] [facility] [place] [facility]    DRAIN/INJECT MEDIUM JOINT/BURSA  2/15/2013    Procedure: COCCYX;  Surgeon: Karlos Barron MD;  [place] [facility]    DRAIN/INJECT MEDIUM JOINT/BURSA  2/15/2013    Procedure: COCCYX;  Surgeon: Karlos Barron MD;  [place] [facility]    DRAIN/INJECT MEDIUM JOINT/BURSA  3/1/2013    Procedure: COCCYX;  Surgeon: Karlos Barron MD;  [place] [facility]    DRAIN/INJECT MEDIUM JOINT/BURSA  3/1/2013    Procedure: COCCYX;  Surgeon: Karlos Barron MD;  [place] [facility]    FLUOR GID & LOCLZJ NDL/CATH SPI DX/THER NJX  11/8/2013    Procedure: COCCYX;  Surgeon: Nima Jameson MD;  [place] [facility]    FLUOR GID & LOCLZJ NDL/CATH SPI DX/THER NJX N/A 11/21/2014    Procedure: STELLATE GANGLION INJECTION;  Surgeon: Nima Jameson MD;  [place] [facility]    FLUOR GID & LOCLZJ NDL/CATH SPI DX/THER NJX N/A 12/29/2014    Procedure: LUMBAR EPIDURAL;  Surgeon: Nima Jameson MD;  [place] [facility]    FLUOR GID & LOCLZJ NDL/CATH SPI DX/THER NJX N/A 1/26/2015    Procedure: GANGLION IMPAR BLOCK;  Surgeon: Nima Jameson MD;  [place] [facility]    FLUOR GID & LOCLZJ NDL/CATH SPI DX/THER NJX N/A 9/10/2015    Procedure: CAUDAL;  Surgeon: Nima Jameson MD;  [place] [facility]    FLUOR GID & LOCLZJ NDL/CATH SPI DX/THER NJX N/A 11/30/2015    Procedure: CAUDAL;  Surgeon: Nima Jameson MD;  [place] [facility]    FLUOROSCOPIC GUIDANCE NEEDLE PLACEMENT  3/8/2012    Procedure: COCCYX;  Surgeon: Karlos Barron MD;  [place] [facility]    FLUOROSCOPIC GUIDANCE NEEDLE PLACEMENT  3/29/2012    Procedure: COCCYX;  Surgeon: Karlos Barron,  MD;  [place] [facility]    FLUOROSCOPIC GUIDANCE NEEDLE PLACEMENT  1/25/2013    Procedure: COCCYX;  Surgeon: Terry Torres MD;  Location: [facility]    FLUOROSCOPIC GUIDANCE NEEDLE PLACEMENT  2/15/2013    Procedure: COCCYX;  Surgeon: Karlos Barron MD;  Location: [facility]    FLUOROSCOPIC GUIDANCE NEEDLE PLACEMENT  10/18/2013    Procedure: COCCYX;  Surgeon: Nima Jameson MD;  Location: [facility]    FLUOROSCOPIC GUIDANCE NEEDLE PLACEMENT  11/8/2013    Procedure: COCCYX;  Surgeon: Nima Jameson MD;  Location: [facility]    HYSTERECTOMY      partial, at age 34    INJ TENDON/LIGAMENT/CYST  9/25/2013    Procedure: COCCYX;  Surgeon: Nima Jameson MD;  Location: [facility]    INJ TENDON/LIGAMENT/CYST  10/18/2013    Procedure: COCCYX;  Surgeon: Nima Jameson MD;  Location: [facility]    INJ TENDON/LIGAMENT/CYST  11/8/2013    Procedure: COCCYX;  Surgeon: Nima Jameson MD;  [place] [facility]    INJECT NERV BLCK,PARAVERT SYMPATH N/A 11/21/2014    Procedure: STELLATE GANGLION INJECTION;  Surgeon: Nima Jameson MD;  [place] [facility]    INJECT NERV BLCK,PARAVERT SYMPATH N/A 12/12/2014    Procedure: STELLATE GANGLION INJECTION;  Surgeon: Nima Jameson MD;  [place] [facility]    INJECT NERV BLCK,PARAVERT SYMPATH N/A 12/29/2014    Procedure: LUMBAR SYMPATHETIC INJECTION;  Surgeon: Nima Jameson MD;  [place] [facility]    INJECT NERV BLCK,PARAVERT SYMPATH N/A 1/26/2015    Procedure: GANGLION IMPAR BLOCK;  Surgeon: Nima Jameson MD;  [place] [facility]    INJECT NERV BLCK,PARAVERT SYMPATH N/A 3/12/2015    Procedure: GANGLION IMPAR BLOCK;  Surgeon: Nima Jameson MD;  [place] [facility]    INJECT NERV BLCK,PARAVERT SYMPATH N/A 7/30/2015     Procedure: GANGLION IMPAR BLOCK;  Surgeon: Nima Jameson MD;  [place] [facility]    INJECT NERV BLCK,PARAVERT SYMPATH N/A 12/22/2015    Procedure: GANGLION IMPAR BLOCK;  Surgeon: Nima Jameson MD;  [place] [facility]    INJECT NERV BLCK,PARAVERT SYMPATH  1/11/2016    Procedure: ;  Surgeon: Nima Jameson MD;  [place] [facility]    INJECTION, ANESTHETIC/STEROID, TRANSFORAMINAL EPIDURAL; LUMBAR/SACRAL, SINGLE LEVEL  9/25/2013    Procedure: TRANSFORAMINAL EPIDURAL - LUMBAR;  Surgeon: Nima Jameson MD;  [place] [facility]    INJECTION, ANESTHETIC/STEROID, TRANSFORAMINAL EPIDURAL; LUMBAR/SACRAL, SINGLE LEVEL  10/18/2013    Procedure: TRANSFORAMINAL EPIDURAL - LUMBAR;  Surgeon: Nima Jameson MD;  Location: [facility]    INJECTION, W/WO CONTRAST, DX/THERAPEUTIC SUBSTANCE, EPIDURAL/SUBARACHNOID; LUMBAR/SACRAL  11/8/2013    Procedure: LUMBAR EPIDURAL;  Surgeon: Nima Jameson MD;  Location: [facility]    INJECTION, W/WO CONTRAST, DX/THERAPEUTIC SUBSTANCE, EPIDURAL/SUBARACHNOID; LUMBAR/SACRAL N/A 11/21/2014    Procedure: LUMBAR EPIDURAL;  Surgeon: Nima Jameson MD;  Location: [facility]    INJECTION, W/WO CONTRAST, DX/THERAPEUTIC SUBSTANCE, EPIDURAL/SUBARACHNOID; LUMBAR/SACRAL N/A 12/29/2014    Procedure: LUMBAR EPIDURAL;  Surgeon: Nima Jameson MD;  Location: [facility]    INJECTION, W/WO CONTRAST, DX/THERAPEUTIC SUBSTANCE, EPIDURAL/SUBARACHNOID; LUMBAR/SACRAL N/A 1/26/2015    Procedure: LUMBAR EPIDURAL;  Surgeon: Nima Jameson MD;  [place] [facility]    INJECTION, W/WO CONTRAST, DX/THERAPEUTIC SUBSTANCE, EPIDURAL/SUBARACHNOID; LUMBAR/SACRAL N/A 9/10/2015    Procedure: CAUDAL;  Surgeon: Nima Jameson MD;  [place] [facility]    INJECTION, W/WO CONTRAST, DX/THERAPEUTIC SUBSTANCE,  EPIDURAL/SUBARACHNOID; LUMBAR/SACRAL N/A 11/30/2015    Procedure: CAUDAL;  Surgeon: Nima Jameson MD;  Location: [facility]    LAPAROSCOPIC CHOLECYSTECTOMY  4/2010    LAPAROSCOPY,DIAGNOSTIC  2002,2011    M-SEDAJ BY  PHYS PERFRMG SVC 5+ YR  3/8/2012    Procedure: COCCYX;  Surgeon: Karlos Barron MD;  Location: [facility]    M-SEDAJ BY  PHYS PERFRMG SVC 5+ YR  3/29/2012    Procedure: COCCYX;  Surgeon: Karlos Barron MD;  Location: [facility]    M-SEDAJ BY  PHYS PERFRMG SVC 5+ YR  1/25/2013    Procedure: COCCYX;  Surgeon: Terry Torres MD;  Location: [facility]    M-SEDAJ BY  PHYS PERFRMG SVC 5+ YR  2/15/2013    Procedure: COCCYX;  Surgeon: Karlos Barron MD;  Location: [facility]    M-SEDAJ BY  PHYS PERFRMG SVC 5+ YR  3/1/2013    Procedure: COCCYX;  Surgeon: Karlos Barron MD;  Location: [facility]    M-SEDAJ BY  PHYS PERFRMG SVC 5+ YR 9/25/2013    Procedure: COCCYX;  Surgeon: Nima Jameson MD;  Location: [facility]    M-SEDAJ BY  PHYS PERFRMG SVC 5+ YR  10/18/2013    Procedure: TRANSFORAMINAL EPIDURAL - LUMBAR;  Surgeon: Nima Jameson MD;  Location: [facility]    M-SEDAJ BY  PHYS PERFRMG SVC 5+ YR 11/8/2013    Procedure: LUMBAR EPIDURAL;  Surgeon: Nima Jameson MD;  Location: [facility]    M-SEDAJ BY  PHYS PERFRMG SVC 5+ YR N/A 11/21/2014    Procedure: STELLATE GANGLION INJECTION;  Surgeon: Nima Jameson MD;  Location: [facility]    M-SEDAJ BY  PHYS PERFRMG SVC 5+ YR N/A 12/12/2014    Procedure: STELLATE GANGLION INJECTION;  Surgeon: Nima Jameson MD;  Location: [facility]    [facility] N/A 12/29/2014    Procedure: LUMBAR EPIDURAL;  Surgeon: Nima Jameson MD;  Location: [facility]    [facility]A [facility]    Procedure: GANGLION IMPAR BLOCK;  Surgeon: Nima Jameson MD;  Location: [facility]    M-SEDAJ BY  PHYS PERFRMG SVC 5+ YR N/A 3/12/2015    Procedure: GANGLION IMPAR BLOCK;  Surgeon: Nima Jameson MD;  Location: [facility]    M-SEDAJ BY  PHYS PERFRMG SVC 5+ YR  3/26/2015    Procedure: GANGLION IMPAR BLOCK;  Surgeon: Nima Jameson MD;  Location: [facility]    M-SEDAJ BY  PHYS PERFRMG SVC 5+ YR  2015    Procedure: ;  Surgeon: Nima Jameson MD;  Location: [facility]    M-SEDAJ BY  PHYS PERFRMG SVC 5+ YR N/A 9/10/2015    Procedure: CAUDAL;  Surgeon: Nima Jameson MD;  Location: [facility]    M-SEDAJ BY  PHYS PERFRMG SVC 5+ YR N/A 2015    Procedure: CAUDAL;  Surgeon: Nima Jameson MD;  Location: [facility]    M-SEDAJ BY  PHYS PERFRMG SVC 5+ YR N/A 2015    Procedure: GANGLION IMPAR BLOCK;  Surgeon: Nima Jameson MD;  Location: [facility]    M-SEDAJ BY  PHYS PERFRMG SVC 5+ YR  2016    Procedure: ;  Surgeon: Nima Jameson MD;  Location: [facility]    NERVOUS SYSTEM SURGERY UNLISTED  3/26/2015    Procedure: GANGLION IMPAR BLOCK;  Surgeon: Nima Jameson MD;  Location: [facility]          OTHER SURGICAL HISTORY      RT shoulder    OTHER SURGICAL HISTORY      Wrist     OTHER SURGICAL HISTORY      kidney stones    REPAIR EPIGASTRIC HERNIA,REDUC  2002    THORACOTOMY,REMV PULM FOREIGN BODY      left lung mass-benign    TONSILLECTOMY      w/adenoids    VENTRAL HERNIA REPAIR       No current outpatient medications on file.     Allergies   Allergen Reactions    Motrin [Ibuprofen]      Edema     Other      IVP dye -unknown reation     Family History   Problem Relation Age of Onset    Cancer Father         lung    Diabetes Maternal Grandfather      Social History      Occupational History    Not on file   Tobacco Use    Smoking status: Former     Packs/day: 1.00     Years: 32.00     Additional pack years: 0.00     Total pack years: 32.00     Types: Cigarettes     Quit date: 3/29/2008     Years since quitting: 15.8    Smokeless tobacco: Never   Vaping Use    Vaping Use: Never used   Substance and Sexual Activity    Alcohol use: No     Alcohol/week: 0.0 standard drinks of alcohol    Drug use: No    Sexual activity: Never        Review of Systems (negative unless bolded):  General: fevers, chills, fatigue  CV:  chest pain, palpitations, leg swelling  Msk: bodyaches, neck pain, neck stiffness  Skin: rashes, open wounds, nonhealing ulcers  Hem: bleeds easily, bruise easily, immunocompromised  Neuro: dizziness, light headedness, headaches  Psych: anxious, depressed, anger issues    GEENA, Yaa Morales, attest that this documentation has been prepared under the direction and in the presence of Dr. Amilcar Dawn MD. **     Amilcar Dawn MD  Hand, Wrist, & Elbow Surgery  [facility]  [address] [place] [address], [address] [email or web address]  [email or web address]  t: [phone]  f: [phone]

## 2024-02-16 NOTE — TELEPHONE ENCOUNTER
Pt daughter would like to request a call back from nurse since the prior authorization will not be complete until Monday they would like teresa advise on what they can do for the meantime.

## 2024-02-16 NOTE — ANESTHESIA PREPROCEDURE EVALUATION
PRE-OP EVALUATION    Patient Name: Radha Jones    Admit Diagnosis: Carpal tunnel syndrome of left wrist [G56.02]  Cubital tunnel syndrome on left [G56.22]    Pre-op Diagnosis: Carpal tunnel syndrome of left wrist [G56.02]  Cubital tunnel syndrome on left [G56.22]    LEFT ENDOSCOPIC CARPAL TUNNEL RELEASE, LEFT CUBITAL TUNNEL RELEASE. A1 PULLEY RELEASE OF LEFT INDEX, MIDDLE AND RING FINGER    Anesthesia Procedure: LEFT ENDOSCOPIC CARPAL TUNNEL RELEASE, LEFT CUBITAL TUNNEL RELEASE. A1 PULLEY RELEASE OF LEFT INDEX, MIDDLE AND RING FINGER (Left)  CUBITAL TUNNEL RELEASE (Left)    Surgeon(s) and Role:     * Amilcar Dawn MD - Primary    Pre-op vitals reviewed.  Temp: 97.9 °F (36.6 °C)  Pulse: 67  Resp: 16  BP: 117/73  SpO2: 100 %  Body mass index is 30.29 kg/m².    Current medications reviewed.  Hospital Medications:   [MAR Hold] acetaminophen (Tylenol Extra Strength) tab 1,000 mg  1,000 mg Oral Once    [MAR Hold] scopolamine (Transderm-Scop) 1 MG/3DAYS patch 1 patch  1 patch Transdermal Once    lactated ringers infusion   Intravenous Continuous    ceFAZolin (Ancef) 2 g in 20mL IV syringe premix  2 g Intravenous Once       Outpatient Medications:     Medications Prior to Admission   Medication Sig Dispense Refill Last Dose    HYDROcodone-acetaminophen (NORCO)  MG Oral Tab Take 1 tablet by mouth every 8 (eight) hours as needed for Pain. 90 tablet 0 2024    metoprolol succinate ER 50 MG Oral Tablet 24 Hr TAKE 1 TABLET(50 MG) BY MOUTH DAILY 30 tablet 0 2/15/2024    ondansetron (ZOFRAN) 4 mg tablet Take 1 tablet (4 mg total) by mouth every 8 (eight) hours as needed for Nausea. 20 tablet 0 Past Month    lidocaine 5 % External Patch Place 1 patch onto the skin daily. 30 patch 0 Past Week    [] pantoprazole 40 MG Oral Tab EC Take 1 tablet (40 mg total) by mouth every morning before breakfast. 30 tablet 11     atorvastatin 20 MG Oral Tab Take 1 tablet (20 mg total) by mouth nightly. 90 tablet 3 2/15/2024     potassium chloride 20 MEQ Oral Tab CR Take 1 tablet (20 mEq total) by mouth daily. 90 tablet 2 2/15/2024    valACYclovir 1 G Oral Tab Take 1 tablet (1,000 mg total) by mouth 2 (two) times a day. (Patient taking differently: Take 1 tablet (1,000 mg total) by mouth as needed.) 180 tablet 0 2/15/2024    Penciclovir (DENAVIR) 1 % External Cream Apply 1 Application topically every 2 (two) hours as needed. 5 g 0     Calcium Polycarbophil (FIBER) 625 MG Oral Tab Take by mouth.   2/15/2024    Naloxone HCl 4 MG/0.1ML Nasal Liquid 4 mg by Nasal route as needed. If patient remains unresponsive, repeat dose in other nostril 2-5 minutes after first dose. 1 kit 0        Allergies: Motrin [ibuprofen] and Other      Anesthesia Evaluation    Patient summary reviewed.    Anesthetic Complications  (-) history of anesthetic complications         GI/Hepatic/Renal      (+) GERD                           Cardiovascular        Exercise tolerance: good     MET: >4      (+) hypertension   (+) hyperlipidemia                    (-) angina              Endo/Other    Negative endo/other ROS.                              Pulmonary        (+) COPD       (-) shortness of breath            Neuro/Psych  Comment: Spinal cord stimulator, currently turned off for surgery  Negative neuro/psych ROS.                                  Past Surgical History:   Procedure Laterality Date    ARTHROCENTESIS ASPIR&/INJ MAJOR JT/BURSA W/US N/A 3/26/2015    Procedure: HIP INJECTION (PAIN);  Surgeon: Nima Jameson MD;  Location: [facility]    ARTHROCENTESIS ASPIR&/INJ MAJOR JT/BURSA W/US  7/30/2015    Procedure: ;  Surgeon: Nima Jameson MD;  Location: [facility]    CARPAL TUNNEL RELEASE      08/2014    COLONOSCOPY  2010    COLONOSCOPY N/A 11/12/2014    Procedure: COLONOSCOPY;  Surgeon: Valentin Ramos MD;  Location:  ENDOSCOPY    D & C      DRAIN/INJECT MEDIUM JOINT/BURSA  3/8/2012    Procedure: COCCYX;  Surgeon:  Karlos Barron MD;  Location: [facility]    DRAIN/INJECT MEDIUM JOINT/BURSA  3/29/2012    Procedure: COCCYX;  Surgeon: Karlos Barron MD;  Location: [facility]    DRAIN/INJECT MEDIUM JOINT/BURSA  3/29/2012    Procedure: COCCYX;  Surgeon: Karlos Barron MD;  Location: [facility]    DRAIN/INJECT MEDIUM JOINT/BURSA  1/25/2013    Procedure: COCCYX;  Surgeon: Terry Torres MD;  Location: [facility]    DRAIN/INJECT MEDIUM JOINT/BURSA  2/15/2013    Procedure: COCCYX;  Surgeon: Karlos Barron MD;  Location: [facility]    DRAIN/INJECT MEDIUM JOINT/BURSA  2/15/2013    Procedure: COCCYX;  Surgeon: Karlos Barron MD;  Location: [facility]    DRAIN/INJECT MEDIUM JOINT/BURSA  3/1/2013    Procedure: COCCYX;  Surgeon: Karlos Barron MD;  Location: [facility]    DRAIN/INJECT MEDIUM JOINT/BURSA  3/1/2013    Procedure: COCCYX;  Surgeon: Karlos Barron MD;  [place] [facility]    FLUOR GID & LOCLZJ NDL/CATH SPI DX/THER NJX  11/8/2013    Procedure: COCCYX;  Surgeon: Nima Jameson MD;  [place] [facility]    FLUOR GID & LOCLZJ NDL/CATH SPI DX/THER NJX N/A 11/21/2014    Procedure: STELLATE GANGLION INJECTION;  Surgeon: Nima Jameson MD;  [place] [facility]    FLUOR GID & LOCLZJ NDL/CATH SPI DX/THER NJX N/A 12/29/2014    Procedure: LUMBAR EPIDURAL;  Surgeon: Nima Jameson MD;  [facility] [facility]    FLUOR GID & LOCLZJ NDL/CATH SPI DX/THER NJX N/A 1/26/2015    Procedure: GANGLION IMPAR BLOCK;  Surgeon: Nima Jameson MD;  [facility] [facility]    FLUOR GID & LOCLZJ NDL/CATH SPI DX/THER NJX N/A 9/10/2015    Procedure: CAUDAL;  Surgeon: Nima Jameson MD;  [facility]    FLUOR GID & LOCLZJ NDL/CATH SPI DX/THER NJX N/A 11/30/2015    Procedure: CAUDAL;   Surgeon: Nima Jameson MD;  Location: [facility]    FLUOROSCOPIC GUIDANCE NEEDLE PLACEMENT  3/8/2012    Procedure: COCCYX;  Surgeon: Karlos Barron MD;  Location: [facility]    FLUOROSCOPIC GUIDANCE NEEDLE PLACEMENT  3/29/2012    Procedure: COCCYX;  Surgeon: Karlos Barron MD;  Location: [facility]    FLUOROSCOPIC GUIDANCE NEEDLE PLACEMENT  1/25/2013    Procedure: COCCYX;  Surgeon: Terry Torres MD;  Location: [facility]    FLUOROSCOPIC GUIDANCE NEEDLE PLACEMENT  2/15/2013    Procedure: COCCYX;  Surgeon: Karlos Barron MD;  Location: [facility]    FLUOROSCOPIC GUIDANCE NEEDLE PLACEMENT  10/18/2013    Procedure: COCCYX;  Surgeon: Nima Jameson MD;  Location: [facility]    FLUOROSCOPIC GUIDANCE NEEDLE PLACEMENT  11/8/2013    Procedure: COCCYX;  Surgeon: Nima Jameson MD;  Location: [facility]    HYSTERECTOMY      partial, at age 34    INJ TENDON/LIGAMENT/CYST  9/25/2013    Procedure: COCCYX;  Surgeon: Nima Jameson MD;  [place] [facility]    INJ TENDON/LIGAMENT/CYST  10/18/2013    Procedure: COCCYX;  Surgeon: Nima Jameson MD;  Location: [facility]    INJ TENDON/LIGAMENT/CYST  11/8/2013    Procedure: COCCYX;  Surgeon: Nima Jameson MD;  [place] [facility]    INJECT NERV BLCK,PARAVERT SYMPATH N/A 11/21/2014    Procedure: STELLATE GANGLION INJECTION;  Surgeon: Nima Jameson MD;  [place] [facility]    INJECT NERV BLCK,PARAVERT SYMPATH N/A 12/12/2014    Procedure: STELLATE GANGLION INJECTION;  Surgeon: Nima Jameson MD;  [place] [facility]    INJECT NERV BLCK,PARAVERT SYMPATH N/A 12/29/2014    Procedure: LUMBAR SYMPATHETIC INJECTION;  Surgeon: Nima Jameson MD;  [place] [facility]    INJECT NERV BLCK,PARAVERT SYMPATH N/A 1/26/2015     Procedure: GANGLION IMPAR BLOCK;  Surgeon: Nima Jameson MD;  Location: [facility]    INJECT NERV BLCK,PARAVERT SYMPATH N/A 3/12/2015    Procedure: GANGLION IMPAR BLOCK;  Surgeon: Nima Jameson MD;  [facility]    INJECT NERV BLCK,PARAVERT SYMPATH N/A 7/30/2015    Procedure: GANGLION IMPAR BLOCK;  Surgeon: Nima Jameson MD;  Location: [facility]    INJECT NERV BLCK,PARAVERT SYMPATH N/A 12/22/2015    Procedure: GANGLION IMPAR BLOCK;  Surgeon: Nima Jameson MD;  Location: [facility]    INJECT NERV BLCK,PARAVERT SYMPATH  1/11/2016    Procedure: ;  Surgeon: Nima Jameson MD;  Location: [facility]    INJECTION, ANESTHETIC/STEROID, TRANSFORAMINAL EPIDURAL; LUMBAR/SACRAL, SINGLE LEVEL  9/25/2013    Procedure: TRANSFORAMINAL EPIDURAL - LUMBAR;  Surgeon: Nima Jameson MD;  Location: [facility]    INJECTION, ANESTHETIC/STEROID, TRANSFORAMINAL EPIDURAL; LUMBAR/SACRAL, SINGLE LEVEL  10/18/2013    Procedure: TRANSFORAMINAL EPIDURAL - LUMBAR;  Surgeon: Nima Jameson MD;  Location: [facility]    INJECTION, W/WO CONTRAST, DX/THERAPEUTIC SUBSTANCE, EPIDURAL/SUBARACHNOID; LUMBAR/SACRAL  11/8/2013    Procedure: LUMBAR EPIDURAL;  Surgeon: Nima Jameson MD;  Location: [facility]    INJECTION, W/WO CONTRAST, DX/THERAPEUTIC SUBSTANCE, EPIDURAL/SUBARACHNOID; LUMBAR/SACRAL N/A 11/21/2014    Procedure: LUMBAR EPIDURAL;  Surgeon: Nima Jmaeson MD;  Location: [facility]    INJECTION, W/WO CONTRAST, DX/THERAPEUTIC SUBSTANCE, EPIDURAL/SUBARACHNOID; LUMBAR/SACRAL N/A 12/29/2014    Procedure: LUMBAR EPIDURAL;  Surgeon: Nima Jameson MD;  Location: [facility]    INJECTION, W/WO CONTRAST, DX/THERAPEUTIC SUBSTANCE, EPIDURAL/SUBARACHNOID; LUMBAR/SACRAL N/A 1/26/2015    Procedure: LUMBAR EPIDURAL;  Surgeon: Nima Jameson  MD;  Location: [facility]    INJECTION, W/WO CONTRAST, DX/THERAPEUTIC SUBSTANCE, EPIDURAL/SUBARACHNOID; LUMBAR/SACRAL N/A 9/10/2015    Procedure: CAUDAL;  Surgeon: Nima Jameson MD;  Location: [facility]    INJECTION, W/WO CONTRAST, DX/THERAPEUTIC SUBSTANCE, EPIDURAL/SUBARACHNOID; LUMBAR/SACRAL N/A 11/30/2015    Procedure: CAUDAL;  Surgeon: Nima Jameson MD;  Location: [facility]    LAPAROSCOPIC CHOLECYSTECTOMY  4/2010    LAPAROSCOPY,DIAGNOSTIC  2002,2011    M-SEDAJ BY  PHYS PERFRMG SVC 5+ YR  3/8/2012    Procedure: COCCYX;  Surgeon: aKrlos Barron MD;  Location: [facility]    M-SEDAJ BY  PHYS PERFRMG SVC 5+ YR  3/29/2012    Procedure: COCCYX;  Surgeon: Karlos Barron MD;  Location: [facility]    M-SEDAJ BY  PHYS PERFRMG SVC 5+ YR  1/25/2013    Procedure: COCCYX;  Surgeon: Terry Torres MD;  Location: [facility]    M-SEDAJ BY  PHYS PERFRMG SVC 5+ YR  2/15/2013    Procedure: COCCYX;  Surgeon: Karlos Barron MD;  Location: [facility]    M-SEDAJ BY  PHYS PERFRMG SVC 5+ YR  3/1/2013    Procedure: COCCYX;  Surgeon: Karlos Barron MD;  Location: [facility]    M-SEDAJ BY  PHYS PERFRMG SVC 5+ YR  9/25/2013    Procedure: COCCYX;  Surgeon: Nima Jameson MD;  Location: [facility]    M-SEDAJ BY  PHYS PERFRMG SVC 5+ YR  10/18/2013    Procedure: TRANSFORAMINAL EPIDURAL - LUMBAR;  Surgeon: Nima Jameson MD;  Location: [facility]    M-SEDAJ BY  PHYS PERFRMG SVC 5+ YR  11/8/2013    Procedure: LUMBAR EPIDURAL;  Surgeon: Nima Jameson MD;  Location: [facility]    M-SEDAJ BY  PHYS PERFRMG SVC 5+ YR N/A 11/21/2014    Procedure: STELLATE GANGLION INJECTION;  Surgeon: Nima Jameson MD;  Location: [facility]    M-SEDA BY [facility] 5+ YR N/A 12/12/2014     Procedure: STELLATE GANGLION INJECTION;  Surgeon: Nima Jameson MD;  [place] [facility]    M-SEDAJ BY  PHYS PERFRMG SVC 5+ YR N/A 2014    Procedure: LUMBAR EPIDURAL;  Surgeon: Nima Jameson MD;  Location: [facility]    M-SEDAJ BY  PHYS PERFRMG SVC 5+ YR N/A 2015    Procedure: GANGLION IMPAR BLOCK;  Surgeon: Nima Jameson MD;  Location: [facility]    M-SEDAJ BY  PHYS PERFRMG SVC 5+ YR N/A 3/12/2015    Procedure: GANGLION IMPAR BLOCK;  Surgeon: Nima Jameson MD;  Location: [facility]    M-SEDAJ BY  PHYS PERFRMG SVC 5+ YR  3/26/2015    Procedure: GANGLION IMPAR BLOCK;  Surgeon: Nima Jameson MD;  Location: [facility]    M-SEDAJ BY  PHYS PERFRMG SVC 5+ YR  2015    Procedure: ;  Surgeon: Nima Jameson MD;  Location: [facility]    M-SEDAJ BY  PHYS PERFRMG SVC 5+ YR N/A 9/10/2015    Procedure: CAUDAL;  Surgeon: Nima Jameson MD;  Location: [facility]    M-SEDAJ BY  PHYS PERFRMG SVC 5+ YR N/A 2015    Procedure: CAUDAL;  Surgeon: Nima Jameson MD;  Location: [facility]    M-SEDAJ BY  PHYS PERFRMG SVC 5+ YR N/A 2015    Procedure: GANGLION IMPAR BLOCK;  Surgeon: Nima Jameson MD;  Location: [facility]    M-SEDAJ BY  PHYS PERFRMG SVC 5+ YR  2016    Procedure: ;  Surgeon: Nima Jameson MD;  Location: [facility]    NERVOUS SYSTEM SURGERY UNLISTED  3/26/2015    Procedure: GANGLION IMPAR BLOCK;  Surgeon: Nima Jameson MD;  Location: [facility]          OTHER SURGICAL HISTORY      RT shoulder    OTHER SURGICAL HISTORY      Wrist     OTHER SURGICAL HISTORY      kidney stones    REPAIR EPIGASTRIC HERNIA,REDUC  2002    THORACOTOMY,REMV PULM FOREIGN BODY      left lung mass-benign    TONSILLECTOMY      w/adenoids    VENTRAL HERNIA REPAIR   2012     Social History     Socioeconomic History    Marital status:    Tobacco Use    Smoking status: Former     Packs/day: 1.00     Years: 32.00     Additional pack years: 0.00     Total pack years: 32.00     Types: Cigarettes     Quit date: 3/29/2008     Years since quitting: 15.8    Smokeless tobacco: Never   Vaping Use    Vaping Use: Never used   Substance and Sexual Activity    Alcohol use: No     Alcohol/week: 0.0 standard drinks of alcohol    Drug use: No    Sexual activity: Never   Other Topics Concern    Caffeine Concern Yes     Comment: 3 cups/cans daily.     Stress Concern No    Weight Concern No    Special Diet No    Exercise No    Seat Belt Yes     History   Drug Use No     Available pre-op labs reviewed.     Lab Results   Component Value Date     02/13/2024    K 4.2 02/13/2024     02/13/2024    CO2 28.0 02/13/2024    BUN 15 02/13/2024    CREATSERUM 0.89 02/13/2024     (H) 02/13/2024    CA 9.0 02/13/2024            Airway      Mallampati: II  Mouth opening: 3 FB  TM distance: 4 - 6 cm  Neck ROM: full Cardiovascular      Rhythm: regular  Rate: normal     Dental             Pulmonary      Breath sounds clear to auscultation bilaterally.               Other findings              ASA: 2   Plan: general and regional  NPO status verified and patient meets guidelines.  Patient has taken beta blockers in last 24 hours.  Post-procedure pain management plan discussed with surgeon and patient.    Comment: I explained benefits of brachial plexus blocks nerve block to Radha Jones including significant (but unlikely complete) pain relief following the surgical procedure, decreased need for postoperative opioid use. Realistic expectation for block duration was discussed as well. I also explained possible downsides of peripheral nerve blocks including failed block, phrenic block, facial droop, prolonged nerve blockade leading to prolonged numbness in upper extremity and hand, with possible  muscle weakness necessitating arm sling. Other very rare complications such as local anesthetic systemic toxicity (LAST), infection, hematoma formation, and permanent nerve damage was also discussed. All questions were answered and patient demonstrated understanding of realistic expectations and risks of peripheral nerve blocks, and wishes to proceed with the procedure. Sites of block were marked by me with patient confirmation.     Plan for TIVA, GA with natural airway   Plan/risks discussed with: patient and child/children                Present on Admission:  **None**

## 2024-02-16 NOTE — OPERATIVE REPORT
Operative Report     Patient Name: Radha Jones    2/16/2024    Preoperative Diagnosis:     Carpal tunnel syndrome of left wrist [G56.02]  Cubital tunnel syndrome on left wrist[G56.22]  Triggering of left index finger  Triggering of left middle finger  Triggering of left ring finger    Postoperative Diagnosis:     Same as above    Surgeon(s) and Role:     * Amilcar Dawn MD - Primary     Assistant: PA: Esme Rainey PA    A PA was needed for the successful completion of this case. She was essential for the proper positioning of patient, manipulation of instruments, proper exposure, manipulation of soft tissue, and wound closure.    Procedures:     Left endoscopic carpal tunnel release (CPT 55949)  Left cubital tunnel release in situ (CPT 59610)  A1 pulley release of left index finger (CPT 14517)  A1 pulley release of left middle finger (CPT 76424)  A1 pulley release of left middle finger (CPT 86697)    Antibiotics: Ancef 2g    Surgical Findings: tenosynovitis between FDS/FDP of each digit, thickened A1 pulley of each digit, no fraying noted    Anesthesia: MAC + Regional    Complications: None    Condition: Stable    Estimated Blood Loss:  5 mL    Indications:  63 year old female with carpal tunnel/cubital tunnel syndrome that failed non-surgical management. Patient consented to a cubital tunnel release and endoscopic carpal tunnel release possible open having understood the risks associated with surgery, expected outcome, time to recovery and the need for possible rehab.  Risks that were discussed but not limited to infection, nerve injury, tendon injury, artery injury, need for additional surgery, and no improvements of present symptoms.      Procedure:  Patient was met in the preoperative holding area where consent was verified, laterality was marked with the surgeon's initials, and the H&P was updated. Patient was brought to the operating room on a transport cart. Patient was then transferred onto the  operating room table and placed in supine position with an arm table and with bony prominences well-padded.  SCDs were placed.  Antibiotics were fully infused. The arm was then prepped and draped in the usual sterile fashion. A surgical timeout was performed.  A sterile upper arm tourniquet was placed and the limb was exsanguinated using Esmarch bandage. Tourniquet was raised to 250mmHg. 1% lidocaine with epi was infiltrated along the medial aspect of elbow along the planned incision.    A transverse incision through the dermis was made 5mm proximal to the distal volar wrist crease just ulnar to the palmaris longus using a 15 blade.  Adson's and Alejandro scissors was used to dissect through the subcutaneous tissue onto the antebrachial fascia.  A distally based 1 cm wide rectangular flap was elevated using a [place] blade.  The flap was retracted distally and volarly allowing access to the carpal tunnel.  The undersurface of the transverse carpal ligament was accessed, dilated, and cleaned.  After assessing the width of the ligament, the microaire endoscopic apparatus with camera was inserted into the carpal tunnel.  Under camera magnification with direct visualization of the undersurface of the transverse carpal ligament, the blade was engaged at the distal extents of the ligament and the release of the ligament was carried out distal to proximal.  I verified the release with the endoscopy camera noting divergent edges of the transverse carpal ligament and fat interposition.  I also physically verified with a tenosynovium elevator complete release of the transverse carpal ligament.       A longitudinal incision through the dermis was made over the A1 pulley for the left index finger, long finger, ring finger using a 15 blade.  Adson's and Alejandro scissors was used to dissect through the subcutaneous tissue onto the A1 adnis.  Ragnell retractors were used to protect the radial and ulnar digital neurovascular bundles.   The entirety of the A1 pulley was identified and incised using a 15 blade for each digit.  Alejandro scissors was used to spread the leaflets of the incised A1 pulley.  Tenosynovitis between FDS/FDP with tenosynovial adhesion between both tendon - poor independent excursion was noted between the two tendons. Tenosynovectomy was performed and independent tendon gliding was noted for each digit. The tourniquet was then lowered and bipolar cautery was used to achieve hemostasis.        A 15 blade was used to make incision through the dermis to the subcutaneous tissue. Bipolar cautery was used to cauterize any small crossing vessels. Adson and alejandro scissors were used to dissect through the subcutaneous tissue until wagner fascia was identified. Wagner fascia was divided and the ulnar was identified. Any fascial elements compressing the nerve proximally were identified and divided. The nerve was then traced distally, dividing fascial elements compressing the nerve. The fascia overlying the FCU was isolated from the FCU muscle underneath with alejandro scissors. The fascia was then divided. The nerve was fully decompressed with no compressive points. The elbow was fully flexed and extended with no subluxation noted.     The tourniquet was then lowered and bipolar cautery was used to achieve hemostasis.       Closure:  The endoscopic carpal tunnel incision was closed using 5-0 Monocryl in a buried simple interrupted fashion.  Exofilm skin glue was applied and Steri-Strips were placed.     The cubital tunnel release incision was closed using 3-0 Monocryl in a buried simple interrupted fashion. 4-0 Monocryl was used in a subcuticular fashion. Exofilm skin glue was applied and Steri-Strips were placed.     4-0 nylon was used to reapproximate the skin edges in a simple interrupted fashion.    Dressing/Splint:  Tegaderm with a pad was applied over the incisions. An ace wrap was placed over the elbow for gentle compression and  swelling control. Patient was placed in a sling.    Adaptic, 4 x 4 sterile gauze, and 2 inch ace wrap was loosely wrapped to hold the sterile dressing in place.     Post Operative:  Patient was woken up from anesthesia and taken to PACU for further recovery and discharge home.    Amilcar Dawn MD   Hand, Wrist, & Elbow Surgery  [email or web address]  t: [phone]  f: [phone]

## 2024-02-19 ENCOUNTER — TELEPHONE (OUTPATIENT)
Dept: INTERNAL MEDICINE CLINIC | Facility: CLINIC | Age: 64
End: 2024-02-19

## 2024-02-19 NOTE — TELEPHONE ENCOUNTER
Incoming fax from [facility] [employer] [place] for Norco has been approved from - 2/16/24 through 8/14/24.    Patient has been notified

## 2024-02-20 ENCOUNTER — TELEPHONE (OUTPATIENT)
Dept: PHYSICAL THERAPY | Facility: HOSPITAL | Age: 64
End: 2024-02-20

## 2024-02-21 ENCOUNTER — TELEPHONE (OUTPATIENT)
Dept: PHYSICAL THERAPY | Facility: HOSPITAL | Age: 64
End: 2024-02-21

## 2024-02-21 NOTE — PROGRESS NOTES
OCCUPATIONAL THERAPY UPPER EXTREMITY EVALUATION   Referring Provider: / Brigid  Diagnosis: Diagnosis:      Carpal tunnel syndrome of left wrist [G56.02]  Cubital tunnel syndrome on left wrist[G56.22]  Triggering of left index finger  Triggering of left middle finger  Triggering of left ring finger        Orders:   Order Date:  2/16/2024  Authorizing Provider:   DARRELL CARTER     Procedure:  OP REFERRAL TO [facility] [[id number]]         Order #:   [id number]  Qty:  1     Priority:  Routine                   Class:   IHP - RFL     Standing Interval:          Standing Occurrences:          Expires on:            Expected by:    Associated DX:  Trigger middle finger of left hand (M65.332)     Order summary:  IHP - RFL, Routine, 8 visits  Occupational  Therapy Area of Concentration: Orthopedic  Occupational Therapy Ortho: UE  If the patient can be seen sooner with a PT vs an OT, can we cancel this order and replace with a PT order? No         Comments:  Rom and strength   2x/week for 6 weeks     SUBJECTIVE:  Radha Jones is a 63 year old female who presents to therapy today with complaints of ***  Pt describes pain level current ***/10, at best ***/10, at worst ***/10.   Current functional limitations include ***.    Radha describes prior level of function ***independent/no limitations.  Employment: {EMPLOYMENT:2067}  Hand Dominance: {RT/LFT/AMBI:1960}  Living Situation: {LIVING_SITUATION:2058}  HPI: ***DOS 2/16/24 Procedures:      Left endoscopic carpal tunnel release (CPT 45730)  Left cubital tunnel release in situ (CPT 06627)  A1 pulley release of left index finger (CPT 17509)  A1 pulley release of left middle finger (CPT 84445)  A1 pulley release of left middle finger (CPT 22768)    Imaging/Tests: ***  Pt goals include ***.  Past medical history was reviewed with Radha. Significant findings: ***      Precautions: {PT_PRECAUTIONS:564::\"None\"}    OBJECTIVE:    OBSERVATION: Unremarkable  ***    OUTCOME MEASURE   On Initial Evaluation:  {Pre and Post Quickdash:53141}      ORTHOTICS: ***    SCAR: {SCAR:}     SENSORY: {SENSORY:::\"WNL\"}  Joint position sense: ***    Sensibility:  Date ***  Two point discrimination:  Right: *** Left: ***  Light touch:    Right: *** Left: ***  Paresthesias   Right: +***- Left: +***-    Light touch Ten Test:  Date: ***  ***Right/Left hand (volar pads):  Thumb: ***/10  IF: ***/10  MF: ***/10  RF: ***/10  SF: ***/10  Palm: ***/10  Other: ***: ***/10    Key  10 = normal sensory perception for patient as compared to contralateral side.     Hyperestheisa or allodynia scorin=normal and 10=hyperesthesia    Reference  J Physiother. 2013 Ge;59(2):132. doi: [phone].  The ten test for sensation.  Elizabeth SCHOFIELD, Mikel MITCHELL, Thao RIGGS.      UE AROM (°) : Right /Left  Right  Date 24 Left   Date 24   Elbow Extension/Flexion ***/*** ***/***          Forearm Supination/Pronation ***/*** ***/***         Wrist Extension/Flexion ***/*** ***/***         Wrist UD/RD ***/*** ***/***         Digits (Index-Small) *** ***   Girth of *** in cm *** ***     Date:  24   Edema Girth measurements (cm)  Right/Left  right left   Elbow Crease ***   ***                       Wrist Crease ***   ***                       MP's ***   ***                      Digit: ***  P1: ***  PIP: ***  P2: ***  DIP: ***  P3: *** ***  ***  ***  ***  ***     DATE: 24 Right***Left  Digital AROM (°) Index Finger Middle Finger Ring Finger Small Finger   MP Ext/Flex ***/*** ***/*** ***/*** ***/***   PIP Ext/Flex ***/*** ***/*** ***/*** ***/***   DIP Ext/Flex ***/*** ***/*** ***/*** ***/***   SAENZ *** *** *** ***     Thumb AROM (°)  Right/Left Right  24 Left   24   Thumb MP Ext/Flex ***/***       ***/***   Thumb IP Ext/Flex ***/***        ***/***   Palmar Abduction ***             ***   Radial Abduction ***             ***   Opposition (Hannahji score= 0 no opp, 10  maximal opposition) ***            ***      Strength (lbs)  Right/Left Right  2/23/24 Left   2/23/24    NT NT   3 Point Pinch  NT NT   Lateral Pinch NT NT   NT= Not tested due to acuity     NECK SCREEN: ***    SPECIAL TESTS: ***  COORDINATION- 9 HOLE PEG TEST:  {OT_Coord_9_Hole:4338}      Occupational profile: history and experiences, patterns of daily living, interests, values and needs:    Patient's expressed concerns about performing occupations and daily life on initial eval: ***    Occupational roles affected by referring diagnosis on initial eval:   Student: ***N/a  ***Unable to perform *** limited   Homemaker: ***N/a  ***Unable to perform ***minimally limited   Spouse: ***N/a  ***Unable to perform ***minimally limited   Parent: ***N/a  ***Unable to perform ***minimally limited   Worker: ***N/a  ***Unable to perform *** limited   Leisure: ***N/a  ***Unable to perform ***limited   Musician: ***N/a  ***Unable to perform *** limited   Crafter: ***N/a  ***Unable to perform *** limited   : ***N/a  ***Unable to perform ***limited   Cook/:***N/a  ***Unable to perform *** limited   /transporter:***N/a  ***Unable to perform *** limited   Caregiver of children: ***N/a  ***Unable to perform *** limited   Caregiver of adult/spouse/parent:***N/a  ***Unable to perform *** limited    Other: *** ***N/a  ***Unable to perform *** limited         ASSESSMENT  Radha presents to occupational therapy evaluation with primary c/o ***. The results of the objective tests and measures show the physical, cognitive and/or psychosocial skills affected in the summary below, including specifically of note decreased ROM, pain and edema in LUE.  Functional deficits include but are not limited to ***.  Signs and symptoms are consistent with diagnoses of multiple trigger fingers and CTS and CuTS, s/p releases. Patient and OT discussed evaluation findings, pathology, POC and HEP.  Patient voiced understanding and  performs HEP correctly without reported pain. Skilled Occupational Therapy is medically necessary to address the above impairments and reach functional goals.     Occupations as identified above (representing ADL's and IADL's, Education, Work, Leisure, and Social Participation) and the following component areas:    ---Physical skills affected by referring diagnosis: Pain, Decreased range of motion, Fine motor coordination, Gross motor coordination,presumed Decreased strength, Decreased endurance, edema,*** Impaired/decreased sensation, ***Allodynia/hypersensitivity to light touch, ***Open wound,***Dense scar tissue formation in the *** hand/***arm.    ---Cognitive skills affected by referring diagnosis: None    ---Psychosocial skills affected by referring diagnosis:  Interpersonal interactions, Habits, Routines, Use of coping strategies.    The Occupational Therapy Evaluation code of 94194 Mod Complex was selected based on the followin. Chart Review: An expanded chart review indicating moderate complexity was performed.  Occupational profile: the following were assessed: presenting problems, reasons for referral, occupational history, patterns of daily living, interests, values, needs, client's goals/concerns about performing occupations and daily life skills.  Medical and therapy history review: PLF identified, review of medical records.    2. Assessment of Occupational Performance: (see above summary of performance deficits identified; levels of assessment used)  high complexity (5 or more performance deficits relating to physical, cognitive, or psychosocial skills).    3. Clinical decision-making: includes the assessment process, ***comorbidities (additional diseases/conditions/disorders, socioeconomic, cultural, environmental, client behavior characteristics) such as multiple sites of nerve impingement , the assessment of modification and need for assistance such as ***, selection of interventions such  as Manual Therapy, Neuromuscular Re-education, Self-Care Home Management, Therapeutic Activities, Therapeutic Exercise, Home Exercise Program instruction, Modalities to include: Ultrasound and hot packs, FT, and taping  and custom splinting, plan of care (intervention strategies, specialized skills, outcome goals), indicating a moderate complexity: analysis of data from detailed assessment ***high complexity: analysis from comprehensive assessment.    These deficits impair the patient's ability to participate in ADLs, IADLs, and meaningful occupational tasks.  Reduced participation in meaningful occupational tasks may increase feelings of sadness ***and isolation, and likelihood of falling***.    Today’s Treatment and Response:   Treatment provided today in addition to the initial evaluation: ***  Date 2/23/24       Visit # 1       # of visits authorized: HMO ***     # of visits in OT POC:  ***     Evaluation Initial X     Progress Report written        Manual Therapy         MEM         STM/IASTM          scar massage         Ther ex          tendon glides ***       Median nerve glides ***       Ulnar nerve glides ***       Fluidotherapy                                                HEP/  Patient education topics See HEP notes below       Therapeutic Activity        In-hand manipulation                                      Objective measurements taken and reviewed with patient  See above       Neuromuscular Re-education                                   Orthotic fitting and training ***     Taping ***     Modalities ***     X= performed this date as previously outlined    HEP  On initial eval, patient education was provided on exam findings, treatment diagnosis, treatment plan, expectations, and prognosis. Patient was also provided recommendations for ***    HEP Date Issued Date modified Date discharged Comments    *** ***                                      Goals: (to be met in *** visits)  Patient will be  independent and compliant with comprehensive HEP to maintain progress achieved in OT.  Patient will demonstrate a decrease in edema in the *** hand/fingers by ***cm circumference to allow for ease with ***composite  around steering wheel.  Patient will report no more than ***/10 pain in *** hand/***arm during self care activities.  Patient will present with increase in *** wrist extension to *** and flexion to *** degrees and/or increase SAENZ of wrist in this plane by *** degrees in order to allow for ease with wiping leoncio area***.    Patient will present with increase in *** elbow active flexion to *** and extension to *** degrees to allow for ease with combing hair and reaching down to tie shoes.  Patient will present with increase in *** forearm supination to *** and pronation to *** and/or increase SAENZ in this plane of movement by *** degrees in order to allow for ease with turning doorknob/***turning key.  Patient will present with  strength in the *** hand to that of ***% of the contralateral hand in order to be able to perform ***.  Patient will present with sufficient ROM and strength in the *** hand/arm to return to work, self care, homemaking and leisure skill independent performance.      PLAN:  Frequency / Duration: Patient will be seen for *** x/week or a total of *** visits over a 90 day period.  Treatment will include: {Ortho Interventions:7087} ***and custom splinting.    Next session: ***    Education or treatment limitation: {TX_LIMIT:1927}  Rehab Potential:{GOOD:115}    Patient/***Family/Caregiver was/***were advised of these findings, precautions, and treatment options and has agreed to actively participate in planning and for this course of care.    Charges: OT Eval: Moderate Complexity, ***  Total Timed Treatment: *** minutes     Total Treatment Time: *** minutes      Indira Taveras OTR/L [facility]  Physician's certification required: Yes  I certify the need for these services furnished  under this plan of treatment and while under my care. ***(electronic signature)    X___________________________________________________ Date____________________    Certification From: 2/23/2024  To:5/23/2024

## 2024-02-22 ENCOUNTER — TELEPHONE (OUTPATIENT)
Dept: PHYSICAL THERAPY | Facility: HOSPITAL | Age: 64
End: 2024-02-22

## 2024-02-22 ENCOUNTER — TELEPHONE (OUTPATIENT)
Dept: OCCUPATIONAL MEDICINE | Age: 64
End: 2024-02-22

## 2024-02-22 NOTE — PROGRESS NOTES
OCCUPATIONAL THERAPY UPPER EXTREMITY EVALUATION   Referring Provider: / Brigid  Diagnosis: Diagnosis:      Carpal tunnel syndrome of left wrist [G56.02]  Cubital tunnel syndrome on left wrist[G56.22]  Triggering of left index finger  Triggering of left middle finger  Triggering of left ring finger         Orders:   Order Date:  2/16/2024  Authorizing Provider:   DARRELL CARTER     Procedure:  OP REFERRAL TO [facility] [[id number]]         Order #:   [id number]  Qty:  1     Priority:  Routine                   Class:   IHP - RFL     Standing Interval:          Standing Occurrences:          Expires on:            Expected by:    Associated DX:  Trigger middle finger of left hand (M65.332)     Order summary:  IHP - RFL, Routine, 8 visits  Occupational  Therapy Area of Concentration: Orthopedic  Occupational Therapy Ortho: UE  If the patient can be seen sooner with a PT vs an OT, can we cancel this order and replace with a PT order? No         Comments:  Rom and strength   2x/week for 6 weeks     SUBJECTIVE:  Radha Jones is a 63 year old female who presents to therapy today with complaints of ***  Pt describes pain level current ***/10, at best ***/10, at worst ***/10.   Current functional limitations include ***.     Radha describes prior level of function ***independent/no limitations.  Employment: {EMPLOYMENT:2067}  Hand Dominance: {RT/LFT/AMBI:1960}  Living Situation: {LIVING_SITUATION:2058}  HPI: ***DOS 2/16/24 Procedures:      Left endoscopic carpal tunnel release (CPT 10241)  Left cubital tunnel release in situ (CPT 04200)  A1 pulley release of left index finger (CPT 29474)  A1 pulley release of left middle finger (CPT 33214)  A1 pulley release of left middle finger (CPT 58144)     Imaging/Tests: ***  Pt goals include ***.  Past medical history was reviewed with Radha. Significant findings: ***        Precautions: {PT_PRECAUTIONS:564::\"None\"}     OBJECTIVE:     OBSERVATION:  Unremarkable ***     OUTCOME MEASURE   On Initial Evaluation:  {Pre and Post Quickdash:31056}        ORTHOTICS: ***     SCAR: {SCAR:}      SENSORY: {SENSORY:::\"WNL\"}  Joint position sense: ***     Sensibility:  Date ***  Two point discrimination:             Right: ***            Left: ***  Light touch:                                  Right: ***            Left: ***  Paresthesias                                Right: +***-         Left: +***-     Light touch Ten Test:  Date: ***  ***Right/Left hand (volar pads):  Thumb: ***/10  IF: ***/10  MF: ***/10  RF: ***/10  SF: ***/10  Palm: ***/10  Other: ***: ***/10     Key  10 = normal sensory perception for patient as compared to contralateral side.      Hyperestheisa or allodynia scorin=normal and 10=hyperesthesia     Reference  J Physiother. 2013 Ge;59(2):132. doi: [phone].  The ten test for sensation.  Elizabeth SCHOFIELD, Mikel MITCHELL, Thao RIGGS.        UE AROM (°) : Right /Left  Right  Date 24 Left   Date 24   Elbow Extension/Flexion ***/*** ***/***          Forearm Supination/Pronation ***/*** ***/***         Wrist Extension/Flexion ***/*** ***/***         Wrist UD/RD ***/*** ***/***         Digits (Index-Small) *** ***   Girth of *** in cm *** ***      Date:  24   Edema Girth measurements (cm)  Right/Left  right left   Elbow Crease ***   ***                       Wrist Crease ***   ***                       MP's ***   ***                      Digit: ***  P1: ***  PIP: ***  P2: ***  DIP: ***  P3: *** ***  ***  ***  ***  ***      DATE: 24 Right***Left  Digital AROM (°) Index Finger Middle Finger Ring Finger Small Finger   MP Ext/Flex ***/*** ***/*** ***/*** ***/***   PIP Ext/Flex ***/*** ***/*** ***/*** ***/***   DIP Ext/Flex ***/*** ***/*** ***/*** ***/***   SAENZ *** *** *** ***      Thumb AROM (°)  Right/Left Right  24 Left   24   Thumb MP Ext/Flex ***/***       ***/***   Thumb IP Ext/Flex ***/***         ***/***   Palmar Abduction ***             ***   Radial Abduction ***             ***   Opposition (Kapandji score= 0 no opp, 10 maximal opposition) ***            ***       Strength (lbs)  Right/Left Right  2/26/24 Left   2/26/24    NT NT   3 Point Pinch  NT NT   Lateral Pinch NT NT   NT= Not tested due to acuity      NECK SCREEN: ***     SPECIAL TESTS: ***  COORDINATION- 9 HOLE PEG TEST:  {OT_Coord_9_Hole:4338}        Occupational profile: history and experiences, patterns of daily living, interests, values and needs:    Patient's expressed concerns about performing occupations and daily life on initial eval: ***     Occupational roles affected by referring diagnosis on initial eval:   Student: ***N/a  ***Unable to perform *** limited   Homemaker: ***N/a  ***Unable to perform ***minimally limited   Spouse: ***N/a  ***Unable to perform ***minimally limited   Parent: ***N/a  ***Unable to perform ***minimally limited   Worker: ***N/a  ***Unable to perform *** limited   Leisure: ***N/a  ***Unable to perform ***limited   Musician: ***N/a  ***Unable to perform *** limited   Crafter: ***N/a  ***Unable to perform *** limited   : ***N/a  ***Unable to perform ***limited   Cook/:***N/a  ***Unable to perform *** limited   /transporter:***N/a  ***Unable to perform *** limited   Caregiver of children: ***N/a  ***Unable to perform *** limited   Caregiver of adult/spouse/parent:***N/a  ***Unable to perform *** limited    Other: *** ***N/a  ***Unable to perform *** limited            ASSESSMENT  Radha presents to occupational therapy evaluation with primary c/o ***. The results of the objective tests and measures show the physical, cognitive and/or psychosocial skills affected in the summary below, including specifically of note decreased ROM, pain and edema in LUE.  Functional deficits include but are not limited to ***.  Signs and symptoms are consistent with diagnoses of multiple trigger fingers  and CTS and CuTS, s/p releases. Patient and OT discussed evaluation findings, pathology, POC and HEP.  Patient voiced understanding and performs HEP correctly without reported pain. Skilled Occupational Therapy is medically necessary to address the above impairments and reach functional goals.      Occupations as identified above (representing ADL's and IADL's, Education, Work, Leisure, and Social Participation) and the following component areas:     ---Physical skills affected by referring diagnosis: Pain, Decreased range of motion, Fine motor coordination, Gross motor coordination,presumed Decreased strength, Decreased endurance, edema,*** Impaired/decreased sensation, ***Allodynia/hypersensitivity to light touch, ***Open wound,***Dense scar tissue formation in the *** hand/***arm.     ---Cognitive skills affected by referring diagnosis: None     ---Psychosocial skills affected by referring diagnosis:  Interpersonal interactions, Habits, Routines, Use of coping strategies.     The Occupational Therapy Evaluation code of 88438 Mod Complex was selected based on the followin. Chart Review: An expanded chart review indicating moderate complexity was performed.  Occupational profile: the following were assessed: presenting problems, reasons for referral, occupational history, patterns of daily living, interests, values, needs, client's goals/concerns about performing occupations and daily life skills.  Medical and therapy history review: PLF identified, review of medical records.     2. Assessment of Occupational Performance: (see above summary of performance deficits identified; levels of assessment used)  high complexity (5 or more performance deficits relating to physical, cognitive, or psychosocial skills).     3. Clinical decision-making: includes the assessment process, ***comorbidities (additional diseases/conditions/disorders, socioeconomic, cultural, environmental, client behavior characteristics) such  as multiple sites of nerve impingement , the assessment of modification and need for assistance such as ***, selection of interventions such as Manual Therapy, Neuromuscular Re-education, Self-Care Home Management, Therapeutic Activities, Therapeutic Exercise, Home Exercise Program instruction, Modalities to include: Ultrasound and hot packs, FT, and taping  and custom splinting, plan of care (intervention strategies, specialized skills, outcome goals), indicating a moderate complexity: analysis of data from detailed assessment ***high complexity: analysis from comprehensive assessment.     These deficits impair the patient's ability to participate in ADLs, IADLs, and meaningful occupational tasks.  Reduced participation in meaningful occupational tasks may increase feelings of sadness ***and isolation, and likelihood of falling***.     Today’s Treatment and Response:   Treatment provided today in addition to the initial evaluation: ***  Date 2/26/24       Visit # 1       # of visits authorized: HMO ***       # of visits in OT POC:  ***       Evaluation Initial X       Progress Report written         Manual Therapy         MEM          STM/IASTM          scar massage         Ther ex          tendon glides ***       Median nerve glides ***       Ulnar nerve glides ***       Fluidotherapy                                                  HEP/  Patient education topics See HEP notes below       Therapeutic Activity         In-hand manipulation                                                             Objective measurements taken and reviewed with patient  See above       Neuromuscular Re-education                                                   Orthotic fitting and training ***       Taping ***       Modalities ***       X= performed this date as previously outlined     HEP  On initial eval, patient education was provided on exam findings, treatment diagnosis, treatment plan, expectations, and prognosis. Patient was  also provided recommendations for ***     HEP Date Issued Date modified Date discharged Comments    *** ***                                                               Goals: (to be met in *** visits)  Patient will be independent and compliant with comprehensive HEP to maintain progress achieved in OT.  Patient will demonstrate a decrease in edema in the *** hand/fingers by ***cm circumference to allow for ease with ***composite  around steering wheel.  Patient will report no more than ***/10 pain in *** hand/***arm during self care activities.  Patient will present with increase in *** wrist extension to *** and flexion to *** degrees and/or increase SAENZ of wrist in this plane by *** degrees in order to allow for ease with wiping leoncio area***.     Patient will present with increase in *** elbow active flexion to *** and extension to *** degrees to allow for ease with combing hair and reaching down to tie shoes.  Patient will present with increase in *** forearm supination to *** and pronation to *** and/or increase SAENZ in this plane of movement by *** degrees in order to allow for ease with turning doorknob/***turning key.  Patient will present with  strength in the *** hand to that of ***% of the contralateral hand in order to be able to perform ***.  Patient will present with sufficient ROM and strength in the *** hand/arm to return to work, self care, homemaking and leisure skill independent performance.        PLAN:  Frequency / Duration: Patient will be seen for *** x/week or a total of *** visits over a 90 day period.  Treatment will include: {Ortho Interventions:7087} ***and custom splinting.     Next session: ***     Education or treatment limitation: {TX_LIMIT:1927}  Rehab Potential:{GOOD:115}     Patient/***Family/Caregiver was/***were advised of these findings, precautions, and treatment options and has agreed to actively participate in planning and for this course of care.     Charges: OT  Eval: Moderate Complexity, ***  Total Timed Treatment: *** minutes     Total Treatment Time: *** minutes       Indira Taveras OTR/L S [facility]  Physician's certification required: Yes  I certify the need for these services furnished under this plan of treatment and while under my care. ***(electronic signature)     X___________________________________________________ Date____________________     Certification From: 2/26/2024  To:5/23/2024

## 2024-02-23 ENCOUNTER — APPOINTMENT (OUTPATIENT)
Dept: OCCUPATIONAL MEDICINE | Age: 64
End: 2024-02-23
Attending: PHYSICIAN ASSISTANT

## 2024-02-26 ENCOUNTER — APPOINTMENT (OUTPATIENT)
Dept: OCCUPATIONAL MEDICINE | Age: 64
End: 2024-02-26
Attending: PHYSICIAN ASSISTANT

## 2024-02-29 ENCOUNTER — OFFICE VISIT (OUTPATIENT)
Dept: ORTHOPEDICS CLINIC | Facility: CLINIC | Age: 64
End: 2024-02-29
Payer: COMMERCIAL

## 2024-02-29 ENCOUNTER — APPOINTMENT (OUTPATIENT)
Dept: OCCUPATIONAL MEDICINE | Age: 64
End: 2024-02-29
Attending: PHYSICIAN ASSISTANT

## 2024-02-29 VITALS — HEIGHT: 65 IN | WEIGHT: 182 LBS | BODY MASS INDEX: 30.32 KG/M2

## 2024-02-29 DIAGNOSIS — G56.02 CARPAL TUNNEL SYNDROME OF LEFT WRIST: Primary | ICD-10-CM

## 2024-02-29 PROCEDURE — 99024 POSTOP FOLLOW-UP VISIT: CPT | Performed by: PHYSICIAN ASSISTANT

## 2024-02-29 NOTE — PROGRESS NOTES
Clinic Note [facility]     Assessment/Plan:  63 year old female    Left elbow cubital tunnel in situ release with endoscopic carpal tunnel eyezin index middle and ring finger A1 pulley release 2/16/2024-I removed her stitches today.  She can progress to heavier activities as tolerated.  I offered therapy she is going to try it on her own at first.  So she is declined for now but I did stressed the importance of doing that to increase her strength.  Will see her back in 4 weeks to monitor her recovery  CMC arthritis right side : She is on blood thinners and can't take advil and aleve. She reports that he can't take ibruprofen due to swelling. She can use voltaren and CBD for pain relief. Patient received 2 steroid injections today.     No diagnosis found.       Follow Up: 1 month     Diagnostic Studies:  X-rays are negative for acute fracture dislocation or deformity  EMG/NCV: Bilateral carpal tunnel syndrome with moderate to severe symptoms in the right hand moderate in the left.  Left ulnar mononeuropathy nonspecific    Physical Exam:    Ht 5' 5\" (1.651 m)   Wt 182 lb (82.6 kg)   BMI 30.29 kg/m²     Constitutional: NAD. AOx3. Well-developed and Well-nourished.   Psychiatric: Normal mood/ affect/ behavior. Judgment and thought content normal.     Left upper Extremity:   Inspection: Skin Intact. No skin lesions. No gross deformity.   Palpation: Mildly tender over the incisions   Motion: Elbow: normal bilateral symmetric ext/flex  Wrist: normal bilateral symmetric ext/flex/sup/pro  Finger: 1 cm tip to palm passively correctable     Sensory: Positive Tinel's at the wrist.    CC: Post-Op (PO LT INDEX & RING FINGER RELEASE, LT CTR; LT CUTR; DOS: 2/16/24/)        HPI: This 63 year old female presents with complaints of pain to her left hand.  She states there is cramping and stiffness.  Is been ongoing since April.  She has throbbing pains along the wrist extending to the fingertips.  Of note she did have  bilateral carpal tunnel releases done in 2014.  She did have complete resolution of her symptoms at that point.  But last June 2022 her symptoms started of pain in the wrist as well as numbness and tingling.  She underwent FCR tenosynovectomy on her right wrist by another provider in January 2023.  She is now almost 8 months out from surgery and states she does not feel any better and still has pain along the right wrist.  Today she is requesting be seen mostly for the left hand.  She has numbness and tingling extending into the fingertips on all the fingers.  She has cramping and pain that limits her from doing her job.  She was off work following surgery on her right side and went back to work light duty.  During her light duty she was doing a lot of repetitive use where she felt like her left hand pain was becoming more of an issue.  She did have an ROSALIND more recently but it is unclear if that was for her right or left side.    Interval history: Patient underwent index middle and ring finger A1 pulley release as well as endoscopic carpal tunnel release and cubital tunnel in situ release.  Her postop pain is improving   Past Medical History:   Diagnosis Date    Arthritis     Back pain     Calculus of kidney     Carpal tunnel syndrome 06/20/2013    Log Date: 11/28/2012     Cervical stenosis of spine     COPD (chronic obstructive pulmonary disease) [facility])     Degeneration of lumbar or lumbosacral intervertebral disc 09/25/2013    DJD (degenerative joint disease) of hip     Bilateral     Esophageal reflux     Essential hypertension     Follicular cyst of ovary     left    Heartburn     High blood pressure     High cholesterol     Hx of motion sickness     Hyperlipidemia     Leg swelling     Migraines     Neuropathy     LUQ    Opiate use 07/08/2016    Osteoporosis 02/18/2014    Other chronic pain 05/01/2017    Presence of other cardiac implants and grafts     Recurrent pneumonia     Renal stones     Rib fracture  06/30/2013    Sciatica 09/25/2013    Spinal stenosis, lumbar region, without neurogenic claudication 09/25/2013    Trigger finger of right thumb 04/05/2017    Trigger finger, left middle finger 04/05/2017    Trigger finger, right index finger 04/05/2017    Trigger finger, right ring finger 04/05/2017    Varicose vein     Ventral hernia 2002    Visual impairment     glasses    Wears glasses      Past Surgical History:   Procedure Laterality Date    ARTHROCENTESIS ASPIR&/INJ MAJOR JT/BURSA W/US N/A 3/26/2015    Procedure: HIP INJECTION (PAIN);  Surgeon: Nima Jameson MD;  Location: [facility]    ARTHROCENTESIS ASPIR&/INJ MAJOR JT/BURSA W/US  7/30/2015    Procedure: ;  Surgeon: Nima Jameson MD;  Location: [facility]    CARPAL TUNNEL RELEASE      08/2014    COLONOSCOPY  2010    COLONOSCOPY N/A 11/12/2014    Procedure: COLONOSCOPY;  Surgeon: Valentin Ramos MD;  Location:  ENDOSCOPY    D & C      DRAIN/INJECT MEDIUM JOINT/BURSA  3/8/2012    Procedure: COCCYX;  Surgeon: aKrlos Barron MD;  Location: [facility]    DRAIN/INJECT MEDIUM JOINT/BURSA  3/29/2012    Procedure: COCCYX;  Surgeon: Karlos Barron MD;  Location: [facility]    DRAIN/INJECT MEDIUM JOINT/BURSA  3/29/2012    Procedure: COCCYX;  Surgeon: Karlos Barron MD;  Location: [facility]    DRAIN/INJECT MEDIUM JOINT/BURSA  1/25/2013    Procedure: COCCYX;  Surgeon: Terry Torres MD;  Location: [facility]    DRAIN/INJECT MEDIUM JOINT/BURSA  2/15/2013    Procedure: COCCYX;  Surgeon: Karlos Barron MD;  Location: [facility]    DRAIN/INJECT MEDIUM JOINT/BURSA  2/15/2013    Procedure: COCCYX;  Surgeon: Karlos Barron MD;  Location: [facility]    DRAIN/INJECT MEDIUM JOINT/BURSA  3/1/2013    Procedure: COCCYX;  Surgeon: Karlos Barron MD;  Location: [facility]    DRAIN/INJECT  MEDIUM JOINT/BURSA  3/1/2013    Procedure: COCCYX;  Surgeon: Karlos Barron MD;  [facility]    FLUOR GID & LOCLZJ NDL/CATH SPI DX/THER NJX  11/8/2013    Procedure: COCCYX;  Surgeon: Nima Jameson MD;  [facility]    FLUOR GID & LOCLZJ NDL/CATH SPI DX/THER NJX N/A 11/21/2014    Procedure: STELLATE GANGLION INJECTION;  Surgeon: Nima Jameson MD;  [facility]: [facility]    FLUOR GID & LOCLZJ NDL/CATH SPI DX/THER NJX N/A 12/29/2014    Procedure: LUMBAR EPIDURAL;  Surgeon: Nima Jameson MD;  [facility]: [facility]    FLUOR GID & LOCLZJ NDL/CATH SPI DX/THER NJX N/A 1/26/2015    Procedure: GANGLION IMPAR BLOCK;  Surgeon: Nima Jameson MD;  [facility]: [facility]    FLUOR GID & LOCLZJ NDL/CATH SPI DX/THER NJX N/A 9/10/2015    Procedure: CAUDAL;  Surgeon: Nima Jameson MD;  Location: [facility]    FLUOR GID & LOCLZJ NDL/CATH SPI DX/THER NJX N/A 11/30/2015    Procedure: CAUDAL;  Surgeon: Nima Jameson MD;  Location: [facility]    FLUOROSCOPIC GUIDANCE NEEDLE PLACEMENT  3/8/2012    Procedure: COCCYX;  Surgeon: Karlos Barron MD;  [facility]: [facility]    FLUOROSCOPIC GUIDANCE NEEDLE PLACEMENT  3/29/2012    Procedure: COCCYX;  Surgeon: Karlos Barron MD;  Location: [facility]    FLUOROSCOPIC GUIDANCE NEEDLE PLACEMENT  1/25/2013    Procedure: COCCYX;  Surgeon: Terry Torres MD;  Location: [facility]    FLUOROSCOPIC GUIDANCE NEEDLE PLACEMENT  2/15/2013    Procedure: COCCYX;  Surgeon: Karlos Barron MD;  [facility]: [facility]    FLUOROSCOPIC GUIDANCE NEEDLE PLACEMENT  10/18/2013    Procedure: COCCYX;  Surgeon: Nima Jameson MD;  Location: [facility]    FLUOROSCOPIC GUIDANCE NEEDLE PLACEMENT  11/8/2013    Procedure: COCCYX;  Surgeon: Nima Jameson MD;  [place]  [facility]    HYSTERECTOMY      partial, at age 34    INJ TENDON/LIGAMENT/CYST  9/25/2013    Procedure: COCCYX;  Surgeon: Nima Jameson MD;  [place] [facility]    INJ TENDON/LIGAMENT/CYST  10/18/2013    Procedure: COCCYX;  Surgeon: Nima Jmaeson MD;  [place] [facility]    INJ TENDON/LIGAMENT/CYST  11/8/2013    Procedure: COCCYX;  Surgeon: Nima Jameson MD;  [place] [facility]    INJECT NERV BLCK,PARAVERT SYMPATH N/A 11/21/2014    Procedure: STELLATE GANGLION INJECTION;  Surgeon: Nima Jameson MD;  [place] [facility]    INJECT NERV BLCK,PARAVERT SYMPATH N/A 12/12/2014    Procedure: STELLATE GANGLION INJECTION;  Surgeon: Nima Jameson MD;  [place] [facility]    INJECT NERV BLCK,PARAVERT SYMPATH N/A 12/29/2014    Procedure: LUMBAR SYMPATHETIC INJECTION;  Surgeon: Nima Jameson MD;  [place] [facility]    INJECT NERV BLCK,PARAVERT SYMPATH N/A 1/26/2015    Procedure: GANGLION IMPAR BLOCK;  Surgeon: Nima Jameson MD;  Location: [facility]    INJECT NERV BLCK,PARAVERT SYMPATH N/A 3/12/2015    Procedure: GANGLION IMPAR BLOCK;  Surgeon: Nima Jameson MD;  Location: [facility]    INJECT NERV BLCK,PARAVERT SYMPATH N/A 7/30/2015    Procedure: GANGLION IMPAR BLOCK;  Surgeon: Nima Jameson MD;  Location: [facility]    INJECT NERV BLCK,PARAVERT SYMPATH N/A 12/22/2015    Procedure: GANGLION IMPAR BLOCK;  Surgeon: Nima Jameson MD;  Location: [facility]    INJECT NERV BLCK,PARAVERT SYMPATH  1/11/2016    Procedure: ;  Surgeon: Nima Jameson MD;  Location: [facility]    INJECTION, ANESTHETIC/STEROID, TRANSFORAMINAL EPIDURAL; LUMBAR/SACRAL, SINGLE LEVEL  9/25/2013    Procedure: TRANSFORAMINAL EPIDURAL - LUMBAR;  Surgeon: Nima Jameson MD;  Location: [facility]    INJECTION, ANESTHETIC/STEROID, TRANSFORAMINAL EPIDURAL; LUMBAR/SACRAL, SINGLE LEVEL  10/18/2013    Procedure: TRANSFORAMINAL EPIDURAL - LUMBAR;  Surgeon: Nima Jameson MD;  Location: [facility]    INJECTION, W/WO CONTRAST, DX/THERAPEUTIC SUBSTANCE, EPIDURAL/SUBARACHNOID; LUMBAR/SACRAL  11/8/2013    Procedure: LUMBAR EPIDURAL;  Surgeon: Nima Jameson MD;  Location: [facility]    INJECTION, W/WO CONTRAST, DX/THERAPEUTIC SUBSTANCE, EPIDURAL/SUBARACHNOID; LUMBAR/SACRAL N/A 11/21/2014    Procedure: LUMBAR EPIDURAL;  Surgeon: Nima Jameson MD;  Location: [facility]    INJECTION, W/WO CONTRAST, DX/THERAPEUTIC SUBSTANCE, EPIDURAL/SUBARACHNOID; LUMBAR/SACRAL N/A 12/29/2014    Procedure: LUMBAR EPIDURAL;  Surgeon: Nima Jameson MD;  Location: [facility]    INJECTION, W/WO CONTRAST, DX/THERAPEUTIC SUBSTANCE, EPIDURAL/SUBARACHNOID; LUMBAR/SACRAL N/A 1/26/2015    Procedure: LUMBAR EPIDURAL;  Surgeon: Nima Jameson MD;  Location: [facility]    INJECTION, W/WO CONTRAST, DX/THERAPEUTIC SUBSTANCE, EPIDURAL/SUBARACHNOID; LUMBAR/SACRAL N/A 9/10/2015    Procedure: CAUDAL;  Surgeon: Nima Jameson MD;  Location: [facility]    INJECTION, W/WO CONTRAST, DX/THERAPEUTIC SUBSTANCE, EPIDURAL/SUBARACHNOID; LUMBAR/SACRAL N/A 11/30/2015    Procedure: CAUDAL;  Surgeon: Nima Jameson MD;  Location: [facility]    LAPAROSCOPIC CHOLECYSTECTOMY  4/2010    LAPAROSCOPY,DIAGNOSTIC  2002,2011    M-SEDAJ BY  PHYS PERFRMG SVC 5+ YR  3/8/2012    Procedure: COCCYX;  Surgeon: Karlos Barron MD;  Location: [facility]    M-SEDAJ BY  PHYS PERFRMG SVC 5+ YR  3/29/2012    Procedure: COCCYX;  Surgeon: Karlos Barron MD;  Location: [facility]    M-SEDAJ BY  PHYS PERFRMG SVC 5+ YR  1/25/2013    Procedure: COCCYX;  Surgeon: Terry Torres MD;   Location: [facility]    M-SEDAJ BY  PHYS PERFRMG SVC 5+ YR  2/15/2013    Procedure: COCCYX;  Surgeon: Karlos Barron MD;  Location: [facility]    M-SEDAJ BY  PHYS PERFRMG SVC 5+ YR  3/1/2013    Procedure: COCCYX;  Surgeon: Karlos Barron MD;  Location: [facility]    M-SEDAJ BY  PHYS PERFRMG SVC 5+ YR  9/25/2013    Procedure: COCCYX;  Surgeon: Nima Jameson MD;  Location: [facility]    M-SEDAJ BY  PHYS PERFRMG SVC 5+ YR  10/18/2013    Procedure: TRANSFORAMINAL EPIDURAL - LUMBAR;  Surgeon: Nima Jameson MD;  Location: [facility]    M-SEDAJ BY  PHYS PERFRMG SVC 5+ YR  11/8/2013    Procedure: LUMBAR EPIDURAL;  Surgeon: Nima Jameson MD;  Location: [facility]    M-SEDAJ BY  PHYS PERFRMG SVC 5+ YR N/A 11/21/2014    Procedure: STELLATE GANGLION INJECTION;  Surgeon: Nima Jameson MD;  Location: [facility]    M-SEDAJ BY  PHYS PERFRMG SVC 5+ YR N/A 12/12/2014    Procedure: STELLATE GANGLION INJECTION;  Surgeon: Nima Jameson MD;  Location: [facility]    M-SEDAJ BY  PHYS PERFRMG SVC 5+ YR N/A 12/29/2014    Procedure: LUMBAR EPIDURAL;  Surgeon: Nima Jameson MD;  Location: [facility]    M-SEDAJ BY  PHYS PERFRMG SVC 5+ YR N/A 1/26/2015    Procedure: GANGLION IMPAR BLOCK;  Surgeon: Nima Jameson MD;  Location: [facility]    M-SEDAJ BY  PHYS PERFRMG SVC 5+ YR N/A 3/12/2015    Procedure: GANGLION IMPAR BLOCK;  Surgeon: Nima Jameson MD;  Location: [facility]    M-SEDAJ BY  PHYS PERFRMG SVC 5+ YR  3/26/2015    Procedure: GANGLION IMPAR BLOCK;  Surgeon: Nima Jameson MD;  Location: [facility]    M-SEDAJ BY [facility] 5[facility]R  7/30/2015    Procedure: ;  Surgeon: Nima Jameson MD;  Location: [facility]    M-SEDAJ BY  PHYS  PERFRMG SVC 5+ YR N/A 9/10/2015    Procedure: CAUDAL;  Surgeon: Nima Jameson MD;  Location: [facility]    M-SEDAJ BY  PHYS PERFRMG SVC 5+ YR N/A 2015    Procedure: CAUDAL;  Surgeon: Nima Jameson MD;  Location: [facility]    M-SEDAJ BY  PHYS PERFRMG SVC 5+ YR N/A 2015    Procedure: GANGLION IMPAR BLOCK;  Surgeon: Nima Jameson MD;  Location: [facility]    M-SEDAJ BY  PHYS PERFRMG SVC 5+ YR  2016    Procedure: ;  Surgeon: Nima Jameson MD;  Location: [facility]    NERVOUS SYSTEM SURGERY UNLISTED  3/26/2015    Procedure: GANGLION IMPAR BLOCK;  Surgeon: Nima Jameson MD;  Location: [facility]          OTHER SURGICAL HISTORY      RT shoulder    OTHER SURGICAL HISTORY      Wrist     OTHER SURGICAL HISTORY      kidney stones    REPAIR EPIGASTRIC HERNIA,REDUC  2002    THORACOTOMY,REMV PULM FOREIGN BODY  2003    left lung mass-benign    TONSILLECTOMY      w/adenoids    VENTRAL HERNIA REPAIR  2012     Current Outpatient Medications   Medication Sig Dispense Refill    HYDROcodone-acetaminophen (NORCO)  MG Oral Tab Take 1 tablet by mouth every 6 (six) hours as needed for Pain. 30 tablet 0    HYDROcodone-acetaminophen (NORCO)  MG Oral Tab Take 1 tablet by mouth every 8 (eight) hours as needed for Pain. 90 tablet 0    metoprolol succinate ER 50 MG Oral Tablet 24 Hr TAKE 1 TABLET(50 MG) BY MOUTH DAILY 30 tablet 0    ondansetron (ZOFRAN) 4 mg tablet Take 1 tablet (4 mg total) by mouth every 8 (eight) hours as needed for Nausea. 20 tablet 0    lidocaine 5 % External Patch Place 1 patch onto the skin daily. 30 patch 0    atorvastatin 20 MG Oral Tab Take 1 tablet (20 mg total) by mouth nightly. 90 tablet 3    potassium chloride 20 MEQ Oral Tab CR Take 1 tablet (20 mEq total) by mouth daily. 90 tablet 2    valACYclovir 1 G Oral Tab Take 1 tablet (1,000 mg total) by mouth 2 (two) times a  day. (Patient taking differently: Take 1 tablet (1,000 mg total) by mouth as needed.) 180 tablet 0    Penciclovir (DENAVIR) 1 % External Cream Apply 1 Application topically every 2 (two) hours as needed. 5 g 0    Calcium Polycarbophil (FIBER) 625 MG Oral Tab Take by mouth.      Naloxone HCl 4 MG/0.1ML Nasal Liquid 4 mg by Nasal route as needed. If patient remains unresponsive, repeat dose in other nostril 2-5 minutes after first dose. (Patient not taking: Reported on 2/29/2024) 1 kit 0     Allergies   Allergen Reactions    Motrin [Ibuprofen]      Edema     Other      IVP dye -unknown reation     Family History   Problem Relation Age of Onset    Cancer Father         lung    Diabetes Maternal Grandfather      Social History     Occupational History    Not on file   Tobacco Use    Smoking status: Former     Packs/day: 1.00     Years: 32.00     Additional pack years: 0.00     Total pack years: 32.00     Types: Cigarettes     Quit date: 3/29/2008     Years since quitting: 15.9    Smokeless tobacco: Never   Vaping Use    Vaping Use: Never used   Substance and Sexual Activity    Alcohol use: No     Alcohol/week: 0.0 standard drinks of alcohol    Drug use: No    Sexual activity: Never        Review of Systems (negative unless bolded):  General: fevers, chills, fatigue  CV:  chest pain, palpitations, leg swelling  Msk: bodyaches, neck pain, neck stiffness  Skin: rashes, open wounds, nonhealing ulcers  Hem: bleeds easily, bruise easily, immunocompromised  Neuro: dizziness, light headedness, headaches  Psych: anxious, depressed, anger issues    Esme Rainey PA-C  Hand, Wrist, & Elbow Surgery  Physician Assistant to Dr. Amilcar Dawn  [facility]  [address] [place] [address], [address], [email or web address]  [email or web address]  t: [phone]  f: [phone]

## 2024-03-01 ENCOUNTER — TELEPHONE (OUTPATIENT)
Dept: PHYSICAL THERAPY | Facility: HOSPITAL | Age: 64
End: 2024-03-01

## 2024-03-04 NOTE — PROGRESS NOTES
OCCUPATIONAL THERAPY UPPER EXTREMITY EVALUATION   Referring Provider: / Brigid  Diagnoses:      Carpal tunnel syndrome of left wrist [G56.02]  Cubital tunnel syndrome on left wrist[G56.22]  Triggering of left index finger  Triggering of left middle finger  Triggering of left ring finger DOS: 2/16/24   Orders:     Order Date:  2/16/2024  Authorizing Provider:   DARRELL CARTER     Procedure:  OP REFERRAL TO [facility] [[id number]]         Order #:   [id number]  Qty:  1     Priority:  Routine                   Class:   IHP - RFL     Standing Interval:          Standing Occurrences:          Expires on:            Expected by:    Associated DX:  Trigger middle finger of left hand (M65.332)     Order summary:  IHP - RFL, Routine, 8 visits  Occupational  Therapy Area of Concentration: Orthopedic  Occupational Therapy Ortho: UE  If the patient can be seen sooner with a PT vs an OT, can we cancel this order and replace with a PT order? No         Comments:  Rom and strength   2x/week for 6 weeks     Order Date:  3/5/2024  Authorizing Provider:   DARRELL CARTER     Procedure:  OP REFERRAL TO [place] [facility] [[id number]]         Order #:   [id number]  Qty:  1     Priority:  Routine                   Class:   IHP - RFL     Standing Interval:          Standing Occurrences:          Expires on:            Expected by:    Associated DX:  Carpal tunnel syndrome of left wrist (G56.02)  Cubital tunnel syndrome on left (G56.22)  Trigger middle finger of left hand (M65.332)     Order summary:  IHP - RFL, Routine, 8 visits  Occupational  Therapy Area of Concentration: Orthopedic  Occupational Therapy Ortho: UE  If the patient can be seen sooner with a PT vs an OT, can we cancel this order and replace with a PT order? No         Comments:  Rom and strength   2x/week for 6 weeks     SUBJECTIVE:  Radha Jones is a 63 year old female who presents to therapy today with complaints of LUE pain and  stiffness.  Pt describes pain level current 7/10, at best 7/10, at worst 8/10.   Current functional limitations include washing dishes, opening doors, driving.    Radha describes prior level of function independent/no limitations.  Employment: Unemployed  Hand Dominance: right  Living Situation: w/ spouse  HPI: had conservative treatment including therapy prior to surgery.  Fingers on both hands would \"lock up\" in demonstrated cupped posture.  DOS: 2/16/24  Procedures:      Left endoscopic carpal tunnel release (CPT 07528)  Left cubital tunnel release in situ (CPT 86305)  A1 pulley release of left index finger (CPT 33848)  A1 pulley release of left middle finger (CPT 85113)  A1 pulley release of left middle finger (CPT 55835)    Imaging/Tests: EMG  Pt goals include biking, cooking, washing/holding dishes.  Past medical history was reviewed with Radha. Significant findings:   Past Medical History:   Diagnosis Date    Arthritis     Back pain     Calculus of kidney     Carpal tunnel syndrome 06/20/2013    Log Date: 11/28/2012     Cervical stenosis of spine     COPD (chronic obstructive pulmonary disease) ([facility])     Degeneration of lumbar or lumbosacral intervertebral disc 09/25/2013    DJD (degenerative joint disease) of hip     Bilateral     Esophageal reflux     Essential hypertension     Follicular cyst of ovary     left    Heartburn     High blood pressure     High cholesterol     Hx of motion sickness     Hyperlipidemia     Leg swelling     Migraines     Neuropathy     LUQ    Opiate use 07/08/2016    Osteoporosis 02/18/2014    Other chronic pain 05/01/2017    Presence of other cardiac implants and grafts     Recurrent pneumonia     Renal stones     Rib fracture 06/30/2013    Sciatica 09/25/2013    Spinal stenosis, lumbar region, without neurogenic claudication 09/25/2013    Trigger finger of right thumb 04/05/2017    Trigger finger, left middle finger 04/05/2017    Trigger finger, right index finger 04/05/2017     Trigger finger, right ring finger 04/05/2017    Varicose vein     Ventral hernia 2002    Visual impairment     glasses    Wears glasses            Precautions: None    OBJECTIVE:    OBSERVATION: scars dry, hesitant to move left hand fingers      OUTCOME MEASURE   On Initial Evaluation:  QuickDASH Outcome Score  Score: 86.36 % (3/7/2024  3:36 PM)      ORTHOTICS: none    SCAR: Flat  and dry    SENSORY:  difficult to assess      UE AROM (°) : Right /Left  Left   Date 3/7/24   Elbow Extension/Flexion WFL   Forearm Supination/Pronation WFL   Wrist Extension/Flexion 55/60             DATE: 3/7/24 Left  Digital AROM (°) Index Finger Middle Finger Ring Finger Small Finger   MP Ext/Flex 0/60 0/60 0/50 0/50   PIP Ext/Flex 0/95 5/95 0/85 0/75   DIP Ext/Flex 0/55 0/70 0/60 0/50   SAENZ 210 220 195 175     Thumb AROM (°)  Right/Left Left   3/7/24   Opposition (Kapandji score= 0 no opp, 10 maximal opposition) 9      Strength (lbs)  Right/Left Right  3/7/24 Left   3/7/24    TBA TBA   3 Point Pinch  TBA TBA   Lateral Pinch TBA TBA   TBA To be assessed          Occupational profile: history and experiences, patterns of daily living, interests, values and needs:    Patient's expressed concerns about performing occupations and daily life on initial eval: work, computer use    Occupational roles affected by referring diagnosis on initial eval:   Homemaker:  limited   Spouse:  limited   Cook/: limited       ASSESSMENT  Radha presents to occupational therapy evaluation with primary c/o LUE pain and stiffness. The results of the objective tests and measures show the physical, cognitive and/or psychosocial skills affected in the summary below, including specifically of note decreased ROM, mild edema and pain in the LUE.  Functional deficits include but are not limited to opening jars, handling dishes, working on computer.  Signs and symptoms are consistent with diagnosis of CTS, CuTS, and trigger fingers, s/p releases on  the Left hand/arm. Patient and OT discussed evaluation findings, pathology, POC and HEP.  Patient voiced understanding and performs HEP correctly without reported pain. Skilled Occupational Therapy is medically necessary to address the above impairments and reach functional goals.     Occupations as identified above (representing ADL's and IADL's, Education, Work, Leisure, and Social Participation) and the following component areas:    ---Physical skills affected by referring diagnosis: Pain, Decreased range of motion, Fine motor coordination, Gross motor coordination,presumed Decreased strength, Decreased endurance, edema, Impaired/decreased sensation, scar tissue formation in the left hand/arm.    ---Cognitive skills affected by referring diagnosis: None    ---Psychosocial skills affected by referring diagnosis:  Interpersonal interactions, Habits, Routines, Use of coping strategies.    The Occupational Therapy Evaluation code of 37333 Mod Complex was selected based on the followin. Chart Review: An expanded chart review indicating moderate complexity was performed.  Occupational profile: the following were assessed: presenting problems, reasons for referral, occupational history, patterns of daily living, interests, values, needs, client's goals/concerns about performing occupations and daily life skills.  Medical and therapy history review: PLF identified, review of medical records.    2. Assessment of Occupational Performance: (see above summary of performance deficits identified; levels of assessment used) moderate complexity (3-5 performance deficits relating to physical, cognitive, or psychosocial skills).    3. Clinical decision-making: includes the assessment process, comorbidities (additional diseases/conditions/disorders, socioeconomic, cultural, environmental, client behavior characteristics) such as right hand involvement with prior CTR and CuTR , the assessment of modification and need for  assistance such as bilateral considerations, selection of interventions such as Manual Therapy, Neuromuscular Re-education, Self-Care Home Management, Therapeutic Activities, Therapeutic Exercise, Home Exercise Program instruction, Modalities to include: Ultrasound and hot packs, FT, and paraffin  and taping, and custom splinting, plan of care (intervention strategies, specialized skills, outcome goals), indicating a  moderate complexity: analysis of data from detailed assessment.    These deficits impair the patient's ability to participate in ADLs, IADLs, and meaningful occupational tasks.  Reduced participation in meaningful occupational tasks may increase feelings of sadness and isolation, and likelihood of falling.    Today’s Treatment and Response:   Treatment provided today in addition to the initial evaluation:   Date 3/7/24       Visit # 1       # of visits authorized:       # of visits in OT POC:  10     Evaluation Initial X     Progress Report written        Manual Therapy         Scar massage reviewed       IASTM, STM                   Ther ex          Median nerve glides x       Ulnar nerve glides x        tendon glides x       Towel scrunches x        apple picking x        Fluidotherapy                            HEP/  Patient education topics See HEP notes below; issued pink foam finger Zenaida wedges       Therapeutic Activity                                                  Self care training       Objective measurements taken and reviewed with patient  See above       Neuromuscular Re-education           Sensory re-ed      Tactile desensitization                  Orthotic fitting and training      Taping      Modalities 5 minutes hot pack     X= performed this date as previously outlined    HEP  On initial eval, patient education was provided on exam findings, treatment diagnosis, treatment plan, expectations, and prognosis. Patient was also provided recommendations for use of heat  judiciously.    HEP Date Issued Date modified Date discharged Comments    Tendon glides, Median nerve and ulnar nerve glides, apple picking 3/7/24      Towel scrunches, walking and running 3/7/24                               Goals: (to be met in 10 visits)  Patient will be independent and compliant with comprehensive HEP to maintain progress achieved in OT.  Patient will present with increase in left digit 2-5 SAENZ to 220 to allow for ease with gripping toothbrush.  Patient will report no more than 1/10 pain in left hand/arm during self care activities.  Patient will present with  strength in the left hand to that of 50% of the mean for age and gender in order to be able to perform housework.  Patient will present with sufficient ROM and strength in the left hand/arm to return to work, self care, homemaking and leisure skill independent performance.      PLAN:  Frequency / Duration: Patient will be seen for 2 x/week or a total of 10 visits over a 90 day period.  Treatment will include: Manual Therapy, Neuromuscular Re-education, Self-Care Home Management, Therapeutic Activities, Therapeutic Exercise, Home Exercise Program instruction, Modalities to include: Ultrasound and hot packs, paraffin, FT, and taping and custom splinting.    Next session: US, FT, rice marble sifting    Education or treatment limitation: None  Rehab Potential:good    Patient was advised of these findings, precautions, and treatment options and has agreed to actively participate in planning and for this course of care.    Charges: OT Eval: Moderate Complexity, 1 TE  Total Timed Treatment: 15 minutes     Total Treatment Time: 45 minutes       Indira Taveras OTR/L S CHT  Physician's certification required: Yes  I certify the need for these services furnished under this plan of treatment and while under my care. (electronic signature)    X___________________________________________________ Date____________________    Certification From:  3/7/2024  To:6/5/2024

## 2024-03-07 ENCOUNTER — OFFICE VISIT (OUTPATIENT)
Dept: OCCUPATIONAL MEDICINE | Age: 64
End: 2024-03-07
Attending: PHYSICIAN ASSISTANT
Payer: COMMERCIAL

## 2024-03-07 DIAGNOSIS — M65.332 TRIGGER MIDDLE FINGER OF LEFT HAND: Primary | ICD-10-CM

## 2024-03-07 PROCEDURE — 97110 THERAPEUTIC EXERCISES: CPT

## 2024-03-07 PROCEDURE — 97166 OT EVAL MOD COMPLEX 45 MIN: CPT

## 2024-03-11 ENCOUNTER — APPOINTMENT (OUTPATIENT)
Dept: OCCUPATIONAL MEDICINE | Age: 64
End: 2024-03-11
Attending: PHYSICIAN ASSISTANT
Payer: COMMERCIAL

## 2024-03-11 NOTE — PROGRESS NOTES
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[G56.22]  Triggering of left index finger  Triggering of left middle finger  Triggering of left ring finger Referring Provider: Brigid  Date of Evaluation:    3/7/24    Precautions:  None  Next MD/PA visit: 4/8/24  DOI: chronic  Date of Surgery: 2/16/24  Procedures:      Left endoscopic carpal tunnel release (CPT 24956)  Left cubital tunnel release in situ (CPT 26334)  A1 pulley release of left index finger (CPT 40303)  A1 pulley release of left middle finger (CPT 00247)  A1 pulley release of left middle finger (CPT 56811)   Insurance Primary/Secondary: [employer] INS / N/A     # Auth Visits: na/            Orders:     Order Date:  2/16/2024  Authorizing Provider:   DARRELL CARTER     Procedure:  OP REFERRAL TO [place] [facility] [[id number]]         Order #:   [id number]  Qty:  1     Priority:  Routine                   Class:   IHP - RFL     Standing Interval:          Standing Occurrences:          Expires on:            Expected by:    Associated DX:  Trigger middle finger of left hand (M65.332)     Order summary:  IHP - RFL, Routine, 8 visits  Occupational  Therapy Area of Concentration: Orthopedic  Occupational Therapy Ortho: UE  If the patient can be seen sooner with a PT vs an OT, can we cancel this order and replace with a PT order? No         Comments:  Rom and strength   2x/week for 6 weeks     Order Date:  3/5/2024  Authorizing Provider:   DARRELL CARTER     Procedure:  OP REFERRAL TO [place] [facility] [[id number]]         Order #:   [id number]  Qty:  1     Priority:  Routine                   Class:   IHP - RFL     Standing Interval:          Standing Occurrences:          Expires on:            Expected by:    Associated DX:  Carpal tunnel syndrome of left wrist (G56.02)  Cubital tunnel syndrome on left (G56.22)  Trigger middle finger of left hand (M65.332)     Order summary:  IHP -  RFL, Routine, 8 visits  Occupational  Therapy Area of Concentration: Orthopedic  Occupational Therapy Ortho: UE  If the patient can be seen sooner with a PT vs an OT, can we cancel this order and replace with a PT order? No         Comments:  Rom and strength   2x/week for 6 weeks     SUBJECTIVE:  C/o tenderness in hand.    Pain: 7/10    OBJECTIVE:        OUTCOME MEASURE   On Initial Evaluation:  QuickDASH Outcome Score  Score: 86.36 % (3/7/2024  3:36 PM)      UE AROM (°) : Right /Left  Left   Date 3/7/24   Elbow Extension/Flexion WFL   Forearm Supination/Pronation WFL   Wrist Extension/Flexion 55/60             DATE: 3/7/24 Left  Digital AROM (°) Index Finger Middle Finger Ring Finger Small Finger   MP Ext/Flex 0/60 0/60 0/50 0/50   PIP Ext/Flex 0/95 5/95 0/85 0/75   DIP Ext/Flex 0/55 0/70 0/60 0/50   SAENZ 210 220 195 175     Thumb AROM (°)  Right/Left Left   3/7/24   Opposition (Kapandji score= 0 no opp, 10 maximal opposition) 9      Strength (lbs)  Right/Left Right  3/7/24 Left   3/7/24    TBA TBA   3 Point Pinch  TBA TBA   Lateral Pinch TBA TBA   TBA To be assessed          Date 3/7/24  3/13/24     Visit # 1  2     # of visits authorized:       # of visits in OT POC:  10 10    Evaluation Initial X     Progress Report written        Manual Therapy         Scar massage reviewed  x     IASTM, STM    roller, golf ball               Ther ex          Median nerve glides x       Ulnar nerve glides x        tendon glides x  in FT     Towel scrunches x        apple picking x        Fluidotherapy    Fluidotherapy for 10 minutes to left hand, with AROM x digits performed.                          HEP/  Patient education topics See HEP notes below; issued pink foam finger Zenaida wedges  scar massage, OT POC, small object manipulation, rice marble sifting     Therapeutic Activity        In-hand manipulation  Large beads, coins, marble rice sifting    Finger ext activities  Tennis ball on Frisbee                                   Self care training       Objective measurements taken and reviewed with patient  See above       Neuromuscular Re-education           Sensory re-ed      Tactile desensitization                  Orthotic fitting and training      Taping      Modalities 5 minutes hot pack Ultrasound:  3mHz  0.8w/cm2  100%  to left hand, palm  for 8 minutes.      X= performed this date as previously outlined    HEP  On initial eval, patient education was provided on exam findings, treatment diagnosis, treatment plan, expectations, and prognosis. Patient was also provided recommendations for use of heat judiciously.    HEP Date Issued Date modified Date discharged Comments    Tendon glides, Median nerve and ulnar nerve glides, apple picking 3/7/24      Towel scrunches, walking and running 3/7/24                               ASSESSMENT  Patient presents with improved skin and scar quality, but scars are moderately hard and sensitive.  Appears to be moving digits more fluidly despite mild edema.    Goals: (to be met in 10 visits)  Patient will be independent and compliant with comprehensive HEP to maintain progress achieved in OT.  Patient will present with increase in left digit 2-5 SAENZ to 220 to allow for ease with gripping toothbrush.  Patient will report no more than 1/10 pain in left hand/arm during self care activities.  Patient will present with  strength in the left hand to that of 50% of the mean for age and gender in order to be able to perform housework.  Patient will present with sufficient ROM and strength in the left hand/arm to return to work, self care, homemaking and leisure skill independent performance.      PLAN:  Frequency / Duration: Patient will be seen for 2 x/week or a total of 10 visits over a 90 day period.  Treatment will include: Manual Therapy, Neuromuscular Re-education, Self-Care Home Management, Therapeutic Activities, Therapeutic Exercise, Home Exercise Program instruction, Modalities to  include: Ultrasound and hot packs, paraffin, FT, and taping and custom splinting.    Next session: US, FT, rice marble sifting  Charges: 1 US, 2 TE, 1 TA   Total Timed Treatment: 45 minutes    Total Treatment Time: 45 minutes  VERONICA Redd, OTR/L, CHT

## 2024-03-12 DIAGNOSIS — G89.29 OTHER CHRONIC PAIN: ICD-10-CM

## 2024-03-12 NOTE — TELEPHONE ENCOUNTER
Refill requested.    Outpatient Medication Detail     Disp Refills Start End    HYDROcodone-acetaminophen (NORCO)  MG Oral Tab 30 tablet 0 2/16/2024 --    Sig - Route: Take 1 tablet by mouth every 6 (six) hours as needed for Pain. - Oral    Sent to pharmacy as: HYDROcodone-Acetaminophen  MG Oral Tablet    Earliest Fill Date: 2/16/2024    E-Prescribing Status: Receipt confirmed by pharmacy (2/16/2024 10:27 AM CST)    No prior authorization was found for this prescription.    Found prior authorization for another prescription for the same medication: Approved      Associated Diagnoses    Bilateral carpal tunnel syndrome        Pharmacy    [id number] [place], [place] - [address] AT [facility] [address], [phone], [phone]

## 2024-03-13 ENCOUNTER — TELEPHONE (OUTPATIENT)
Dept: PHYSICAL THERAPY | Facility: HOSPITAL | Age: 64
End: 2024-03-13

## 2024-03-13 ENCOUNTER — OFFICE VISIT (OUTPATIENT)
Dept: OCCUPATIONAL MEDICINE | Age: 64
End: 2024-03-13
Attending: PHYSICIAN ASSISTANT
Payer: COMMERCIAL

## 2024-03-13 PROCEDURE — 97530 THERAPEUTIC ACTIVITIES: CPT

## 2024-03-13 PROCEDURE — 97110 THERAPEUTIC EXERCISES: CPT

## 2024-03-13 PROCEDURE — 97035 APP MDLTY 1+ULTRASOUND EA 15: CPT

## 2024-03-13 NOTE — TELEPHONE ENCOUNTER
LOV: 2/15/24  RTC: no follow ups on file   Filled: 2/16/24 # 30 0 refill   Future Appointments   Date Time Provider Department Center   3/13/2024  2:45 PM [facility]   3/19/2024  3:00 PM [facility]   3/21/2024  8:45 AM Indira Taveras OT [facility]   4/8/2024  1:15 PM [facility] Dawn [id number]

## 2024-03-14 NOTE — PROGRESS NOTES
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[G56.22]  Triggering of left index finger  Triggering of left middle finger  Triggering of left ring finger Referring Provider: Brigid  Date of Evaluation:    3/7/24    Precautions:  None  Next MD/PA visit: 4/8/24  DOI: chronic  Date of Surgery: 2/16/24  Procedures:      Left endoscopic carpal tunnel release (CPT 99288)  Left cubital tunnel release in situ (CPT 40133)  A1 pulley release of left index finger (CPT 16789)  A1 pulley release of left middle finger (CPT 17135)  A1 pulley release of left middle finger (CPT 93012)   Insurance Primary/Secondary: [employer] INS / N/A     # Auth Visits: na/            Orders:     Order Date:  2/16/2024  Authorizing Provider:   DARRELL CARTER     Procedure:  OP REFERRAL TO [facility] [[id number]]         Order #:   [id number]  Qty:  1     Priority:  Routine                   Class:   IHP - RFL     Standing Interval:          Standing Occurrences:          Expires on:            Expected by:    Associated DX:  Trigger middle finger of left hand (M65.332)     Order summary:  IHP - RFL, Routine, 8 visits  Occupational  Therapy Area of Concentration: Orthopedic  Occupational Therapy Ortho: UE  If the patient can be seen sooner with a PT vs an OT, can we cancel this order and replace with a PT order? No         Comments:  Rom and strength   2x/week for 6 weeks     Order Date:  3/5/2024  Authorizing Provider:   DARRELL CARTER     Procedure:  OP REFERRAL TO [facility] [[id number]]         Order #:   [id number]  Qty:  1     Priority:  Routine                   Class:   IHP - RFL     Standing Interval:          Standing Occurrences:          Expires on:            Expected by:    Associated DX:  Carpal tunnel syndrome of left wrist (G56.02)  Cubital tunnel syndrome on left (G56.22)  Trigger middle finger of left hand (M65.332)     Order summary:  IHP -  RFL, Routine, 8 visits  Occupational  Therapy Area of Concentration: Orthopedic  Occupational Therapy Ortho: UE  If the patient can be seen sooner with a PT vs an OT, can we cancel this order and replace with a PT order? No         Comments:  Rom and strength   2x/week for 6 weeks     SUBJECTIVE:  C/o pain in the proximal ends of the scars on the IF and MF.    Pain: with pressure 8/10    OBJECTIVE:        OUTCOME MEASURE   On Initial Evaluation:  QuickDASH Outcome Score  Score: 86.36 % (3/7/2024  3:36 PM)      UE AROM (°) : Right /Left  Left   Date 3/7/24   Elbow Extension/Flexion WFL   Forearm Supination/Pronation WFL   Wrist Extension/Flexion 55/60             DATE: 3/7/24 Left  Digital AROM (°) Index Finger Middle Finger Ring Finger Small Finger   MP Ext/Flex 0/60 0/60 0/50 0/50   PIP Ext/Flex 0/95 5/95 0/85 0/75   DIP Ext/Flex 0/55 0/70 0/60 0/50   SAENZ 210 220 195 175     Thumb AROM (°)  Right/Left Left   3/7/24   Opposition (Kapandji score= 0 no opp, 10 maximal opposition) 9      Strength (lbs)  Right/Left Right  3/7/24 Left   3/7/24    TBA TBA   3 Point Pinch  TBA TBA   Lateral Pinch TBA TBA   TBA To be assessed          Date 3/7/24  3/13/24  3/19/24   Visit # 1  2  3   # of visits authorized:       # of visits in OT POC:  10 10 10   Evaluation Initial X     Progress Report written        Manual Therapy         Scar massage reviewed  x  x   IASTM, STM    roller, golf ball  roll over yellow flexbar             Ther ex          Median nerve glides x       Ulnar nerve glides x        tendon glides x  in FT     Towel scrunches x        apple picking x        Fluidotherapy    Fluidotherapy for 10 minutes to left hand, with AROM x digits performed.   Fluidotherapy for 10 minutes to left hand, with AROM x digits performed.    putty roll,      yellow            HEP/  Patient education topics See HEP notes below; issued pink foam finger Zenaida wedges  scar massage, OT POC, small object manipulation, rice  marble sifting  issued yellow putty for roll and /pinch   Therapeutic Activity        In-hand manipulation  Large beads, coins, marble rice sifting Barley; medium beads   Finger ext activities  Tennis ball on Frisbee Tennis ball on Frisbee, golf ball on game tray                                 Self care training       Objective measurements taken and reviewed with patient  See above       Neuromuscular Re-education           Sensory re-ed   Rice marble sifting   Tactile desensitization   Barley sifting, vibrating ball in-hand manipulation               Orthotic fitting and training      Taping      Modalities 5 minutes hot pack Ultrasound:  3mHz  0.8w/cm2  100%  to left hand, palm  for 8 minutes.   Ultrasound:  3mHz  0.8w/cm2  100%  to left hand, palm  for 8 minutes.   X= performed this date as previously outlined    HEP  On initial eval, patient education was provided on exam findings, treatment diagnosis, treatment plan, expectations, and prognosis. Patient was also provided recommendations for use of heat judiciously.    HEP Date Issued Date modified Date discharged Comments    Tendon glides, Median nerve and ulnar nerve glides, apple picking 3/7/24      Towel scrunches, walking and running 3/7/24      Yellow putty for , roll and pinch 3/19/24                        ASSESSMENT  Patient presents with increase in sensitivity in the left palm today. Scar tissue is pink and slightly bulky at proximal ends of their lines.  Suspect overuse of hand.  While patient c/o not being able to make a full fist and still some stiffness and \"cramping\" and \"locking\" in both hands, but this has not been observed by therapist.    Goals: (to be met in 10 visits)  Patient will be independent and compliant with comprehensive HEP to maintain progress achieved in OT. (Progressing)  Patient will present with increase in left digit 2-5 SAENZ to 220 to allow for ease with gripping toothbrush. (Progressing)  Patient will report no  more than 1/10 pain in left hand/arm during self care activities. (Progressing)  Patient will present with  strength in the left hand to that of 50% of the mean for age and gender in order to be able to perform housework. (Progressing)  Patient will present with sufficient ROM and strength in the left hand/arm to return to work, self care, homemaking and leisure skill independent performance. (Progressing)      PLAN:  Frequency / Duration: Patient will be seen for 2 x/week or a total of 10 visits over a 90 day period.  Treatment will include: Manual Therapy, Neuromuscular Re-education, Self-Care Home Management, Therapeutic Activities, Therapeutic Exercise, Home Exercise Program instruction, Modalities to include: Ultrasound and hot packs, paraffin, FT, and taping and custom splinting.    Next session: US, FT, finger extension exercises; cotton ball flicks, intrinsic stretches; dog-toy and small ball rotation    Charges: 1 US, 1 NM, 1 TE, 1 TA   Total Timed Treatment: 45 minutes    Total Treatment Time: 45 minutes  VERONICA Redd, OTR/L, CHT

## 2024-03-15 ENCOUNTER — TELEPHONE (OUTPATIENT)
Dept: PHYSICAL THERAPY | Facility: HOSPITAL | Age: 64
End: 2024-03-15

## 2024-03-15 RX ORDER — HYDROCODONE BITARTRATE AND ACETAMINOPHEN 10; 325 MG/1; MG/1
1 TABLET ORAL EVERY 8 HOURS PRN
Qty: 90 TABLET | Refills: 0 | Status: SHIPPED | OUTPATIENT
Start: 2024-03-15

## 2024-03-19 ENCOUNTER — OFFICE VISIT (OUTPATIENT)
Dept: OCCUPATIONAL MEDICINE | Age: 64
End: 2024-03-19
Attending: PHYSICIAN ASSISTANT
Payer: COMMERCIAL

## 2024-03-19 PROCEDURE — 97112 NEUROMUSCULAR REEDUCATION: CPT

## 2024-03-19 PROCEDURE — 97110 THERAPEUTIC EXERCISES: CPT

## 2024-03-19 PROCEDURE — 97035 APP MDLTY 1+ULTRASOUND EA 15: CPT

## 2024-03-19 PROCEDURE — 97530 THERAPEUTIC ACTIVITIES: CPT

## 2024-03-19 NOTE — PROGRESS NOTES
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[G56.22]  Triggering of left index finger  Triggering of left middle finger  Triggering of left ring finger Referring Provider: Brigid  Date of Evaluation:    3/7/24    Precautions:  None  Next MD/PA visit: 4/8/24  DOI: chronic  Date of Surgery: 2/16/24  Procedures:      Left endoscopic carpal tunnel release (CPT 81640)  Left cubital tunnel release in situ (CPT 79873)  A1 pulley release of left index finger (CPT 88182)  A1 pulley release of left middle finger (CPT 51322)  A1 pulley release of left middle finger (CPT 81978)   Insurance Primary/Secondary: [employer] INS / N/A     # Auth Visits: na/            Orders:     Order Date:  2/16/2024  Authorizing Provider:   DARRELL CARTER     Procedure:  OP REFERRAL TO [facility] [[id number]]         Order #:   [id number]  Qty:  1     Priority:  Routine                   Class:   IHP - RFL     Standing Interval:          Standing Occurrences:          Expires on:            Expected by:    Associated DX:  Trigger middle finger of left hand (M65.332)     Order summary:  IHP - RFL, Routine, 8 visits  Occupational  Therapy Area of Concentration: Orthopedic  Occupational Therapy Ortho: UE  If the patient can be seen sooner with a PT vs an OT, can we cancel this order and replace with a PT order? No         Comments:  Rom and strength   2x/week for 6 weeks     Order Date:  3/5/2024  Authorizing Provider:   DARRELL CARTER     Procedure:  OP REFERRAL TO [facility] [[id number]]         Order #:   [id number]  Qty:  1     Priority:  Routine                   Class:   IHP - RFL     Standing Interval:          Standing Occurrences:          Expires on:            Expected by:    Associated DX:  Carpal tunnel syndrome of left wrist (G56.02)  Cubital tunnel syndrome on left (G56.22)  Trigger middle finger of left hand (M65.332)     Order summary:  IHP -  RFL, Routine, 8 visits  Occupational  Therapy Area of Concentration: Orthopedic  Occupational Therapy Ortho: UE  If the patient can be seen sooner with a PT vs an OT, can we cancel this order and replace with a PT order? No         Comments:  Rom and strength   2x/week for 6 weeks     SUBJECTIVE:  \"It's better.\"  C/o feeling of \"fire\" at times in the palm of the hand.    Pain: with pressure 6/10    OBJECTIVE:        OUTCOME MEASURE   On Initial Evaluation:  QuickDASH Outcome Score  Score: 86.36 % (3/7/2024  3:36 PM)      UE AROM (°) : Right /Left  Left   Date 3/7/24   Elbow Extension/Flexion WFL   Forearm Supination/Pronation WFL   Wrist Extension/Flexion 55/60             DATE: 3/7/24 Left  Digital AROM (°) Index Finger Middle Finger Ring Finger Small Finger   MP Ext/Flex 0/60 0/60 0/50 0/50   PIP Ext/Flex 0/95 5/95 0/85 0/75   DIP Ext/Flex 0/55 0/70 0/60 0/50   SAENZ 210 220 195 175     Date: 3/21/24  LEFT  Digital AROM (°)  Index Finger  Middle Finger  Ring Finger  Small Finger    MP Ext/Flex  0/70 0/75 0/70 0/80   PIP Ext/Flex  0/90 10/95 5/95 0/95   DIP Ext/Flex  0/50 0/60 0/60 0/65   SAENZ  210 220 220 240       Thumb AROM (°)  Right/Left Left   3/7/24   Opposition (Kapandji score= 0 no opp, 10 maximal opposition) 9      Strength (lbs)  Right/Left Right  3/7/24 Left   3/7/24    TBA TBA   3 Point Pinch  TBA TBA   Lateral Pinch TBA TBA   TBA To be assessed          Date 3/7/24  3/13/24  3/19/24 3/21/24   Visit # 1  2  3 4   # of visits authorized:        # of visits in OT POC:  10 10 10 10   Evaluation Initial X      Progress Report written         Manual Therapy          Scar massage reviewed  x  x    IASTM, STM    roller, golf ball  roll over yellow flexbar               Ther ex           Median nerve glides x        Ulnar nerve glides x         tendon glides x  in FT      Towel scrunches x         apple picking x         Fluidotherapy    Fluidotherapy for 10 minutes to left hand, with AROM x digits  performed.   Fluidotherapy for 10 minutes to left hand, with AROM x digits performed. Fluidotherapy for 10 minutes to left hand, with AROM x digits performed.    putty roll,      yellow yellow   Intrinsic stretches      Into yellow putty    HEP/  Patient education topics See HEP notes below; issued pink foam finger Zenaida wedges  scar massage, OT POC, small object manipulation, rice marble sifting  issued yellow putty for roll and /pinch Anatomy of injury   Therapeutic Activity         In-hand manipulation  Large beads, coins, marble rice sifting Barley; medium beads dog toy, stones; rice bowl marble sifting   Finger ext activities  Tennis ball on Frisbee Tennis ball on Frisbee, golf ball on game tray Finger pulls through yellow bulk putty                                      Self care training        Objective measurements taken and reviewed with patient  See above     See above   Neuromuscular Re-education            Sensory re-ed   Rice marble sifting Rice marble sifting   Tactile desensitization   Barley sifting, vibrating ball in-hand manipulation Vibrating ball                 Orthotic fitting and training       Taping       Modalities 5 minutes hot pack Ultrasound:  3mHz  0.8w/cm2  100%  to left hand, palm  for 8 minutes.   Ultrasound:  3mHz  0.8w/cm2  100%  to left hand, palm  for 8 minutes. Ultrasound:  3mHz  0.8w/cm2  100%  to left hand, palm  for 8 minutes.   X= performed this date as previously outlined    HEP  On initial eval, patient education was provided on exam findings, treatment diagnosis, treatment plan, expectations, and prognosis. Patient was also provided recommendations for use of heat judiciously.    HEP Date Issued Date modified Date discharged Comments    Tendon glides, Median nerve and ulnar nerve glides, apple picking 3/7/24      Towel scrunches, walking and running 3/7/24      Yellow putty for , roll and pinch 3/19/24                        ASSESSMENT  Patient presents with  improvement in ROM in the RF and SF, but overall unchanged in the IF and MF.  Scars are less bulky and hard, but still with some neural sensitivity in the palm of the hand noted.    Goals: (to be met in 10 visits)  Patient will be independent and compliant with comprehensive HEP to maintain progress achieved in OT. (Progressing)  Patient will present with increase in left digit 2-5 SAENZ to 220 to allow for ease with gripping toothbrush. (Progressing)  Patient will report no more than 1/10 pain in left hand/arm during self care activities. (Progressing)  Patient will present with  strength in the left hand to that of 50% of the mean for age and gender in order to be able to perform housework. (Progressing)  Patient will present with sufficient ROM and strength in the left hand/arm to return to work, self care, homemaking and leisure skill independent performance. (Progressing)      PLAN:  Frequency / Duration: Patient will be seen for 2 x/week or a total of 10 visits over a 90 day period.  Treatment will include: Manual Therapy, Neuromuscular Re-education, Self-Care Home Management, Therapeutic Activities, Therapeutic Exercise, Home Exercise Program instruction, Modalities to include: Ultrasound and hot packs, paraffin, FT, and taping and custom splinting.    Next session: US, FT, finger extension exercises; cotton ball flicks, intrinsic stretches; dog-toy and small ball rotation    Charges: 1 US, 1 TE, 1 TA, 1 NM   Total Timed Treatment: 45 minutes    Total Treatment Time: 45 minutes  VERONICA Redd, OTR/L, CHT

## 2024-03-21 ENCOUNTER — OFFICE VISIT (OUTPATIENT)
Dept: OCCUPATIONAL MEDICINE | Age: 64
End: 2024-03-21
Attending: PHYSICIAN ASSISTANT
Payer: COMMERCIAL

## 2024-03-21 PROCEDURE — 97035 APP MDLTY 1+ULTRASOUND EA 15: CPT

## 2024-03-21 PROCEDURE — 97530 THERAPEUTIC ACTIVITIES: CPT

## 2024-03-21 PROCEDURE — 97110 THERAPEUTIC EXERCISES: CPT

## 2024-03-21 PROCEDURE — 97112 NEUROMUSCULAR REEDUCATION: CPT

## 2024-03-21 NOTE — PROGRESS NOTES
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[G56.22]  Triggering of left index finger  Triggering of left middle finger  Triggering of left ring finger Referring Provider: Amilcar Dawn Date of Evaluation:    3/7/24    Precautions:  None  Next MD/PA visit: 4/8/24  DOI: chronic  Date of Surgery: 2/16/24  Procedures:      Left endoscopic carpal tunnel release (CPT 54418)  Left cubital tunnel release in situ (CPT 22304)  A1 pulley release of left index finger (CPT 00224)  A1 pulley release of left middle finger (CPT 49398)  A1 pulley release of left middle finger (CPT 76931)   Insurance Primary/Secondary: AET[employer] INS / N/A     # Auth Visits: n/a           Orders:     Order Date:  2/16/2024  Authorizing Provider:   DARRELL CARTER     Procedure:  OP REFERRAL TO [facility] [[id number]]         Order #:   [id number]  Qty:  1     Priority:  Routine                   Class:   IHP - RFL     Standing Interval:          Standing Occurrences:          Expires on:            Expected by:    Associated DX:  Trigger middle finger of left hand (M65.332)     Order summary:  IHP - RFL, Routine, 8 visits  Occupational  Therapy Area of Concentration: Orthopedic  Occupational Therapy Ortho: UE  If the patient can be seen sooner with a PT vs an OT, can we cancel this order and replace with a PT order? No         Comments:  Rom and strength   2x/week for 6 weeks     Order Date:  3/5/2024  Authorizing Provider:   DARRELL CARTER     Procedure:  OP REFERRAL TO [facility] [[id number]]         Order #:   [id number]  Qty:  1     Priority:  Routine                   Class:   IHP - RFL     Standing Interval:          Standing Occurrences:          Expires on:            Expected by:    Associated DX:  Carpal tunnel syndrome of left wrist (G56.02)  Cubital tunnel syndrome on left (G56.22)  Trigger middle finger of left hand (M65.332)     Order summary:  IHP -  RFL, Routine, 8 visits  Occupational  Therapy Area of Concentration: Orthopedic  Occupational Therapy Ortho: UE  If the patient can be seen sooner with a PT vs an OT, can we cancel this order and replace with a PT order? No         Comments:  Rom and strength   2x/week for 6 weeks     SUBJECTIVE:  C/o \"Heat\" in palm of hand and in volar MF and RF.    Pain: with pressure 6/10 on scar of IF    OBJECTIVE:        OUTCOME MEASURE   On Initial Evaluation:  QuickDASH Outcome Score  Score: 86.36 % (3/7/2024  3:36 PM)      UE AROM (°) : Right /Left  Left   Date 3/7/24   Elbow Extension/Flexion WFL   Forearm Supination/Pronation WFL   Wrist Extension/Flexion 55/60             DATE: 3/7/24 Left  Digital AROM (°) Index Finger Middle Finger Ring Finger Small Finger   MP Ext/Flex 0/60 0/60 0/50 0/50   PIP Ext/Flex 0/95 5/95 0/85 0/75   DIP Ext/Flex 0/55 0/70 0/60 0/50   SAENZ 210 220 195 175     Date: 3/21/24  LEFT  Digital AROM (°)  Index Finger  Middle Finger  Ring Finger  Small Finger    MP Ext/Flex  0/70 0/75 0/70 0/80   PIP Ext/Flex  0/90 10/95 5/95 0/95   DIP Ext/Flex  0/50 0/60 0/60 0/65   SAENZ  210 220 220 240       Thumb AROM (°)  Right/Left Left   3/7/24   Opposition (Kapandji score= 0 no opp, 10 maximal opposition) 9      Strength (lbs)  Right/Left Right  3/7/24 Left   3/7/24    TBA TBA   3 Point Pinch  TBA TBA   Lateral Pinch TBA TBA   TBA To be assessed          Date 3/7/24  3/13/24  3/19/24 3/21/24 3/25/24   Visit # 1  2  3 4 5   # of visits authorized:         # of visits in OT POC:  10 10 10 10 10   Evaluation Initial X       Progress Report written          Manual Therapy           Scar massage reviewed  x  x     IASTM, STM    roller, golf ball  roll over yellow flexbar  Roll over yellow flexbar               Ther ex            Median nerve glides x         Ulnar nerve glides x          tendon glides x  in FT       Towel scrunches x          apple picking x          Fluidotherapy    Fluidotherapy for 10  minutes to left hand, with AROM x digits performed.   Fluidotherapy for 10 minutes to left hand, with AROM x digits performed. Fluidotherapy for 10 minutes to left hand, with AROM x digits performed. Fluidotherapy for 10 minutes to left hand, with AROM x digits performed.    putty roll,      yellow yellow yellow   Intrinsic stretches      Into yellow putty Into yellow power web    HEP/  Patient education topics See HEP notes below; issued pink foam finger Zenaida wedges  scar massage, OT POC, small object manipulation, rice marble sifting  issued yellow putty for roll and /pinch Anatomy of injury Contrast baths taught   Therapeutic Activity          In-hand manipulation  Large beads, coins, marble rice sifting Barley; medium beads dog toy, stones; rice bowl marble sifting Small balls,    Finger ext activities  Tennis ball on Frisbee Tennis ball on Frisbee, golf ball on game tray Finger pulls through yellow bulk putty Cotton ball finger flicks   Bulk putty     , roll, pinch and search                                   Self care training         Objective measurements taken and reviewed with patient  See above     See above    Neuromuscular Re-education             Sensory re-ed   Rice marble sifting Rice marble sifting    Tactile desensitization   Barley sifting, vibrating ball in-hand manipulation Vibrating ball                    Orthotic fitting and training        Taping        Modalities 5 minutes hot pack Ultrasound:  3mHz  0.8w/cm2  100%  to left hand, palm  for 8 minutes.   Ultrasound:  3mHz  0.8w/cm2  100%  to left hand, palm  for 8 minutes. Ultrasound:  3mHz  0.8w/cm2  100%  to left hand, palm  for 8 minutes. Ultrasound:  3mHz  0.8w/cm2  100%  to left hand, palm  for 8 minutes.   X= performed this date as previously outlined    HEP  On initial eval, patient education was provided on exam findings, treatment diagnosis, treatment plan, expectations, and prognosis. Patient was also provided  recommendations for use of heat judiciously.    HEP Date Issued Date modified Date discharged Comments    Tendon glides, Median nerve and ulnar nerve glides, apple picking 3/7/24      Towel scrunches, walking and running 3/7/24      Yellow putty for , roll and pinch 3/19/24                        ASSESSMENT  Patient presents with improving quality of scars, with the exception of the base/proximal end of the IF's scar.  Hand has more normal appearance in color and turgor.    Goals: (to be met in 10 visits)  Patient will be independent and compliant with comprehensive HEP to maintain progress achieved in OT. (Progressing)  Patient will present with increase in left digit 2-5 SAENZ to 220 to allow for ease with gripping toothbrush. (Progressing)  Patient will report no more than 1/10 pain in left hand/arm during self care activities. (Progressing)  Patient will present with  strength in the left hand to that of 50% of the mean for age and gender in order to be able to perform housework. (Progressing)  Patient will present with sufficient ROM and strength in the left hand/arm to return to work, self care, homemaking and leisure skill independent performance. (Progressing)      PLAN:  Frequency / Duration: Patient will be seen for 2 x/week or a total of 10 visits over a 90 day period.  Treatment will include: Manual Therapy, Neuromuscular Re-education, Self-Care Home Management, Therapeutic Activities, Therapeutic Exercise, Home Exercise Program instruction, Modalities to include: Ultrasound and hot packs, paraffin, FT, and taping and custom splinting.    Next session: US, FT, finger extension exercises; cotton ball flicks, intrinsic stretches; yellow flexbar in-hand rotation,  and twist    Charges: 1 US, 1 TE, 1 TA   Total Timed Treatment: 45 minutes    Total Treatment Time: 45 minutes  VERONICA Redd, OTR/L, CHT

## 2024-03-25 ENCOUNTER — OFFICE VISIT (OUTPATIENT)
Dept: OCCUPATIONAL MEDICINE | Age: 64
End: 2024-03-25
Attending: PHYSICIAN ASSISTANT
Payer: COMMERCIAL

## 2024-03-25 PROCEDURE — 97110 THERAPEUTIC EXERCISES: CPT

## 2024-03-25 PROCEDURE — 97035 APP MDLTY 1+ULTRASOUND EA 15: CPT

## 2024-03-25 PROCEDURE — 97530 THERAPEUTIC ACTIVITIES: CPT

## 2024-03-26 NOTE — PROGRESS NOTES
ProgressSummary  Pt has attended 6 visits in Occupational Therapy.     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[G56.22]  Triggering of left index finger  Triggering of left middle finger  Triggering of left ring finger Referring Provider: Amilcar Dawn Date of Evaluation:    3/7/24    Precautions:  None  Next MD/PA visit: 4/8/24  DOI: chronic  Date of Surgery: 2/16/24  Procedures:      Left endoscopic carpal tunnel release (CPT 61061)  Left cubital tunnel release in situ (CPT 49150)  A1 pulley release of left index finger (CPT 99570)  A1 pulley release of left middle finger (CPT 68413)  A1 pulley release of left middle finger (CPT 60344)   Insurance Primary/Secondary: [employer] INS / N/A     # Auth Visits: n/a           Orders:     Order Date:  2/16/2024  Authorizing Provider:   DARRELL CARTER     Procedure:  OP REFERRAL TO [facility] [[id number]]         Order #:   [id number]  Qty:  1     Priority:  Routine                   Class:   IHP - RFL     Standing Interval:          Standing Occurrences:          Expires on:            Expected by:    Associated DX:  Trigger middle finger of left hand (M65.332)     Order summary:  IHP - RFL, Routine, 8 visits  Occupational  Therapy Area of Concentration: Orthopedic  Occupational Therapy Ortho: UE  If the patient can be seen sooner with a PT vs an OT, can we cancel this order and replace with a PT order? No         Comments:  Rom and strength   2x/week for 6 weeks     Order Date:  3/5/2024  Authorizing Provider:   DARRELL CARTER     Procedure:  OP REFERRAL TO [facility] [[id number]]         Order #:   [id number]  Qty:  1     Priority:  Routine                   Class:   IHP - RFL     Standing Interval:          Standing Occurrences:          Expires on:            Expected by:    Associated DX:  Carpal tunnel syndrome of left wrist (G56.02)  Cubital tunnel syndrome on left  (G56.22)  Trigger middle finger of left hand (M65.332)     Order summary:  IHP - RFL, Routine, 8 visits  Occupational  Therapy Area of Concentration: Orthopedic  Occupational Therapy Ortho: UE  If the patient can be seen sooner with a PT vs an OT, can we cancel this order and replace with a PT order? No         Comments:  Rom and strength   2x/week for 6 weeks     SUBJECTIVE:  \"Today's not a good day, with this weather.\"  \"It's been really hurting, like a pound pain, or if it's like a throbbing pain, I haven't done anything;  I wonder if it's with the weather.\"  Reports the IF scar is the most sensitive.    Pain: with pressure 6/10 on scar of IF    OBJECTIVE:        OUTCOME MEASURE   On Initial Evaluation:  QuickDASH Outcome Score  Score: 86.36 % (3/7/2024  3:36 PM)      Interim  Post QuickDASH Outcome Score  Post Score: 65.91 % (3/27/2024  1:17 PM)  20.45 % improvement        UE AROM (°) : Right /Left  Left   Date 3/7/24   Elbow Extension/Flexion WFL   Forearm Supination/Pronation WFL   Wrist Extension/Flexion 55/60             DATE: 3/7/24 Left  Digital AROM (°) Index Finger Middle Finger Ring Finger Small Finger   MP Ext/Flex 0/60 0/60 0/50 0/50   PIP Ext/Flex 0/95 5/95 0/85 0/75   DIP Ext/Flex 0/55 0/70 0/60 0/50   SAENZ 210 220 195 175     Date: 3/21/24  LEFT  Digital AROM (°)  Index Finger  Middle Finger  Ring Finger  Small Finger    MP Ext/Flex  0/70 0/75 0/70 0/80   PIP Ext/Flex  0/90 10/95 5/95 0/95   DIP Ext/Flex  0/50 0/60 0/60 0/65   SAENZ  210 220 220 240     Date: 3/27/24  LEFT  Digital AROM (°)  Index Finger  Middle Finger  Ring Finger  Small Finger    MP Ext/Flex  0/70 0/75 0/70 0/70   PIP Ext/Flex  0/90 0/90 0/95 0/105   DIP Ext/Flex  0/40 0/60 0/65 0/80   SAENZ  200 225 230 255         Thumb AROM (°)  Right/Left Left   3/7/24   Opposition (Kapandji score= 0 no opp, 10 maximal opposition) 9      Strength (lbs)  Right/Left Right  3/7/24 Left   3/7/24 Right/left 3/27/24    TBA TBA 23/15   3 Point  Pinch  TBA TBA 7/5   Lateral Pinch TBA TBA 6/7   TBA To be assessed        Five Rung test:  Date: 3/27/24  Rung position Left Right   1 10# 15#   2 19# 25#   3 24# 25#   4 15# 20#   5 15# 17#   Interpretation: slight bell curve on left, but not one right. Unclear if this represents underperformance in the unaffected hand.        Date 3/7/24  3/13/24  3/19/24 3/21/24 3/25/24 3/27/24   Visit # 1  2  3 4 5 6   # of visits authorized:          # of visits in OT POC:  10 10 10 10 10 10   Evaluation Initial X        Progress Report written        X    Manual Therapy            Scar massage reviewed  x  x      IASTM, STM    roller, golf ball  roll over yellow flexbar  Roll over yellow flexbar                 Ther ex             Median nerve glides x          Ulnar nerve glides x           tendon glides x  in FT        Towel scrunches x           apple picking x           Fluidotherapy    Fluidotherapy for 10 minutes to left hand, with AROM x digits performed.   Fluidotherapy for 10 minutes to left hand, with AROM x digits performed. Fluidotherapy for 10 minutes to left hand, with AROM x digits performed. Fluidotherapy for 10 minutes to left hand, with AROM x digits performed. Fluidotherapy for 10 minutes to left hand, with AROM x digits performed.    putty roll,      yellow yellow yellow yellow   Intrinsic stretches      Into yellow putty Into yellow power web     HEP/  Patient education topics See HEP notes below; issued pink foam finger Zenaida wedges  scar massage, OT POC, small object manipulation, rice marble sifting  issued yellow putty for roll and /pinch Anatomy of injury Contrast baths taught OT POC to continue [facility] current HEP for another 6 weeks if she is unable to return without insurance   Therapeutic Activity           In-hand manipulation  Large beads, coins, marble rice sifting Barley; medium beads dog toy, stones; rice bowl marble sifting Small balls,     Finger ext activities  Tennis ball on Frisbee  Tennis ball on Frisbee, golf ball on game tray Finger pulls through yellow bulk putty Cotton ball finger flicks    Bulk putty     , roll, pinch and search                                        Self care training          Objective measurements taken and reviewed with patient  See above     See above  See above   Neuromuscular Re-education              Sensory re-ed   Rice marble sifting Rice marble sifting     Tactile desensitization   Barley sifting, vibrating ball in-hand manipulation Vibrating ball                       Orthotic fitting and training         Taping         Modalities 5 minutes hot pack Ultrasound:  3mHz  0.8w/cm2  100%  to left hand, palm  for 8 minutes.   Ultrasound:  3mHz  0.8w/cm2  100%  to left hand, palm  for 8 minutes. Ultrasound:  3mHz  0.8w/cm2  100%  to left hand, palm  for 8 minutes. Ultrasound:  3mHz  0.8w/cm2  100%  to left hand, palm  for 8 minutes. Ultrasound:  3mHz  0.8w/cm2  100%  to left hand, palm  for 8 minutes.   X= performed this date as previously outlined    HEP  On initial eval, patient education was provided on exam findings, treatment diagnosis, treatment plan, expectations, and prognosis. Patient was also provided recommendations for use of heat judiciously.    HEP Date Issued Date modified Date discharged Comments    Tendon glides, Median nerve and ulnar nerve glides, apple picking 3/7/24      Towel scrunches, walking and running 3/7/24      Yellow putty for , roll and pinch 3/19/24                        ASSESSMENT  Patient has been seen in Occupational Therapy for 6 sessions. Focus of skilled OT has been on ROM, pain and scar tissue.  She has demonstrated improvement in the areas of ROM, pain and strength.  Patient rated outcome measure of the Quick DASH indicates improvement in function as well.  However, patient presents with the following ongoing deficits: pain and decreased strength and ROM in the left hand digits that are interfering with functional task  performance.  She could benefit from further skilled OT to address these performance components and facilitate recovery of function for return to work, self care and leisure activities.  However, at this time, her current insurance expires on 3/30/24. She was offered to continue under self pay, but she indicated she is working on getting new insurance set up.        Goals: (to be met in 10 visits)  Patient will be independent and compliant with comprehensive HEP to maintain progress achieved in OT. (Progressing)  Patient will present with increase in left digit 2-5 SAENZ to 220 to allow for ease with gripping toothbrush. (Progressing)  Patient will report no more than 1/10 pain in left hand/arm during self care activities. (Progressing)  Patient will present with  strength in the left hand to that of 50% of the mean for age and gender in order to be able to perform housework. (Progressing)  Patient will present with sufficient ROM and strength in the left hand/arm to return to work, self care, homemaking and leisure skill independent performance. (Progressing)      PLAN:  Frequency / Duration: Patient will be seen for 2 x/week or a total of 10 visits over a 90 day period.  Treatment will include: Manual Therapy, Neuromuscular Re-education, Self-Care Home Management, Therapeutic Activities, Therapeutic Exercise, Home Exercise Program instruction, Modalities to include: Ultrasound and hot packs, paraffin, FT, and taping and custom splinting.    Next session: Continue per current POC, pending insurance implementation.    Charges: 1 US, 2 TE   Total Timed Treatment: 45 minutes    Total Treatment Time: 45 minutes  VERONICA Redd, OTR/L, CHT

## 2024-03-27 ENCOUNTER — OFFICE VISIT (OUTPATIENT)
Dept: OCCUPATIONAL MEDICINE | Age: 64
End: 2024-03-27
Attending: PHYSICIAN ASSISTANT
Payer: COMMERCIAL

## 2024-03-27 PROCEDURE — 97110 THERAPEUTIC EXERCISES: CPT

## 2024-03-27 PROCEDURE — 97035 APP MDLTY 1+ULTRASOUND EA 15: CPT

## 2024-04-04 NOTE — MR AVS SNAPSHOT
[facility]  [address]  [place] [address]  [phone]               Thank you for choosing us for your health care visit with Partha Chung MD.  We are glad to serve you and happy to provide you with this smith lidocaine 5 % Ptch   APPLY ONE PATCH TO THE SKIN EVERY DAY AS DIRECTED   Commonly known as:  LIDODERM           Losartan Potassium 50 MG Tabs   Take 50 mg by mouth daily.    Commonly known as:  COZAAR           Ondansetron HCl 4 mg tablet   Take 1 tablet b 36.2

## 2024-04-08 ENCOUNTER — OFFICE VISIT (OUTPATIENT)
Dept: ORTHOPEDICS CLINIC | Facility: CLINIC | Age: 64
End: 2024-04-08
Payer: COMMERCIAL

## 2024-04-08 VITALS — BODY MASS INDEX: 30.32 KG/M2 | WEIGHT: 182 LBS | HEIGHT: 65 IN

## 2024-04-08 DIAGNOSIS — Z48.89 ENCOUNTER FOR POSTOPERATIVE CARE: Primary | ICD-10-CM

## 2024-04-08 RX ORDER — METHYLPREDNISOLONE 4 MG/1
TABLET ORAL
Qty: 1 EACH | Refills: 0 | Status: SHIPPED | OUTPATIENT
Start: 2024-04-08

## 2024-04-08 NOTE — PROGRESS NOTES
Clinic Note     Assessment/Plan:  63 year old female    Left elbow cubital tunnel in situ release with endoscopic carpal tunnel release index middle and ring finger A1 pulley release 2/16/2024-  continue to increase activities tolerated. HEP. Scar sensitivity will continue to improve. Continue to follow for sensory/motor improvement.  CMC arthritis right side : She is on blood thinners and can't take advil and aleve. She reports that he can't take ibruprofen due to swelling. She can use voltaren and CBD for pain relief. Rx medrol dosepak    Follow Up: 6-8 weeks    Diagnostic Studies:  X-rays are negative for acute fracture dislocation or deformity  EMG/NCV: Bilateral carpal tunnel syndrome with moderate to severe symptoms in the right hand moderate in the left.  Left ulnar mononeuropathy nonspecific    Physical Exam:    Ht 5' 5\" (1.651 m)   Wt 182 lb (82.6 kg)   BMI 30.29 kg/m²     Constitutional: NAD. AOx3. Well-developed and Well-nourished.   Psychiatric: Normal mood/ affect/ behavior. Judgment and thought content normal.     Left upper Extremity:   Inspection: Skin Intact. No skin lesions. No gross deformity.   Palpation: Mildly tender over the incisions   Motion: Elbow: normal bilateral symmetric ext/flex  Wrist: normal bilateral symmetric ext/flex/sup/pro  Finger: 1 cm tip to palm passively correctable     Sensory: Positive Tinel's at the wrist.    CC: Post-Op (LEFT INDEX & RING FINGER RELEASE, LEFT CTR; LT CUTR; DOS: 2/16/24)    HPI: This 63 year old female presents with complaints of pain to her left hand.  She states there is cramping and stiffness.  Is been ongoing since April.  She has throbbing pains along the wrist extending to the fingertips.  Of note she did have bilateral carpal tunnel releases done in 2014.  She did have complete resolution of her symptoms at that point.  But last June 2022 her symptoms started of pain in the wrist as well as numbness and tingling.  She underwent FCR  tenosynovectomy on her right wrist by another provider in January 2023.  She is now almost 8 months out from surgery and states she does not feel any better and still has pain along the right wrist.  Today she is requesting be seen mostly for the left hand.  She has numbness and tingling extending into the fingertips on all the fingers.  She has cramping and pain that limits her from doing her job.  She was off work following surgery on her right side and went back to work light duty.  During her light duty she was doing a lot of repetitive use where she felt like her left hand pain was becoming more of an issue.  She did have an ROSALIND more recently but it is unclear if that was for her right or left side.    Interval history: Patient underwent index middle and ring finger A1 pulley release as well as endoscopic carpal tunnel release and cubital tunnel in situ release.  Her postop pain is improving     Interval history (4/8/24): Patient is in office for a follow-up regarding reoccurring pain within her left index and ring finger pain. Patient is concerned that her fingers get very swollen. Slight numbness and constant pain. She describes the pain \"someone hitting with hammer\".    H/o Neuropathy in her feet. Hurts her to wear shoes.    Past Medical History:   Diagnosis Date    Arthritis     Back pain     Calculus of kidney     Carpal tunnel syndrome 06/20/2013    Log Date: 11/28/2012     Cervical stenosis of spine     COPD (chronic obstructive pulmonary disease) [facility])     Degeneration of lumbar or lumbosacral intervertebral disc 09/25/2013    DJD (degenerative joint disease) of hip     Bilateral     Esophageal reflux     Essential hypertension     Follicular cyst of ovary     left    Heartburn     High blood pressure     High cholesterol     Hx of motion sickness     Hyperlipidemia     Leg swelling     Migraines     Neuropathy     LUQ    Opiate use 07/08/2016    Osteoporosis 02/18/2014    Other chronic pain 05/01/2017     Presence of other cardiac implants and grafts     Recurrent pneumonia     Renal stones     Rib fracture 06/30/2013    Sciatica 09/25/2013    Spinal stenosis, lumbar region, without neurogenic claudication 09/25/2013    Trigger finger of right thumb 04/05/2017    Trigger finger, left middle finger 04/05/2017    Trigger finger, right index finger 04/05/2017    Trigger finger, right ring finger 04/05/2017    Varicose vein     Ventral hernia 2002    Visual impairment     glasses    Wears glasses      Past Surgical History:   Procedure Laterality Date    ARTHROCENTESIS ASPIR&/INJ MAJOR JT/BURSA W/US N/A 3/26/2015    Procedure: HIP INJECTION (PAIN);  Surgeon: Nima Jameson MD;  Location: [facility]    ARTHROCENTESIS ASPIR&/INJ MAJOR JT/BURSA W/US  7/30/2015    Procedure: ;  Surgeon: Nima Jameson MD;  Location: [facility]    CARPAL TUNNEL RELEASE      08/2014    COLONOSCOPY  2010    COLONOSCOPY N/A 11/12/2014    Procedure: COLONOSCOPY;  Surgeon: Valentin Ramos MD;  Location:  ENDOSCOPY    D & C      DRAIN/INJECT MEDIUM JOINT/BURSA  3/8/2012    Procedure: COCCYX;  Surgeon: Karlos Barron MD;  Location: [facility]    DRAIN/INJECT MEDIUM JOINT/BURSA  3/29/2012    Procedure: COCCYX;  Surgeon: Karlos Barron MD;  Location: [facility]    DRAIN/INJECT MEDIUM JOINT/BURSA  3/29/2012    Procedure: COCCYX;  Surgeon: Karlos Barron MD;  Location: [facility]    DRAIN/INJECT MEDIUM JOINT/BURSA  1/25/2013    Procedure: COCCYX;  Surgeon: Terry Torres MD;  Location: [facility]    DRAIN/INJECT MEDIUM JOINT/BURSA  2/15/2013    Procedure: COCCYX;  Surgeon: Karlos Barron MD;  Location: [facility]    DRAIN/INJECT MEDIUM JOINT/BURSA  2/15/2013    Procedure: COCCYX;  Surgeon: Karlos Barron MD;  Location: [facility]    DRAIN/INJECT MEDIUM JOINT/BURSA  3/1/2013     Procedure: COCCYX;  Surgeon: Karlos Barron MD;  Location: [facility]    DRAIN/INJECT MEDIUM JOINT/BURSA  3/1/2013    Procedure: COCCYX;  Surgeon: Karlos Barron MD;  Location: [facility]    FLUOR GID & LOCLZJ NDL/CATH SPI DX/THER NJX  11/8/2013    Procedure: COCCYX;  Surgeon: Nima Jameson MD;  Location: [facility]    FLUOR GID & LOCLZJ NDL/CATH SPI DX/THER NJX N/A 11/21/2014    Procedure: STELLATE GANGLION INJECTION;  Surgeon: Nima Jameson MD;  Location: [facility]    FLUOR GID & LOCLZJ NDL/CATH SPI DX/THER NJX N/A 12/29/2014    Procedure: LUMBAR EPIDURAL;  Surgeon: Nima Jameson MD;  Location: [facility]    FLUOR GID & LOCLZJ NDL/CATH SPI DX/THER NJX N/A 1/26/2015    Procedure: GANGLION IMPAR BLOCK;  Surgeon: Nima Jameson MD;  Location: [facility]    FLUOR GID & LOCLZJ NDL/CATH SPI DX/THER NJX N/A 9/10/2015    Procedure: CAUDAL;  Surgeon: Nima Jameson MD;  Location: [facility]    FLUOR GID & LOCLZJ NDL/CATH SPI DX/THER NJX N/A 11/30/2015    Procedure: CAUDAL;  Surgeon: Nima Jameson MD;  Location: [facility]    FLUOROSCOPIC GUIDANCE NEEDLE PLACEMENT  3/8/2012    Procedure: COCCYX;  Surgeon: Karlos Barron MD;  Location: [facility]    FLUOROSCOPIC GUIDANCE NEEDLE PLACEMENT  3/29/2012    Procedure: COCCYX;  Surgeon: Karlos Barron MD;  Location: [facility]    FLUOROSCOPIC GUIDANCE NEEDLE PLACEMENT  1/25/2013    Procedure: COCCYX;  Surgeon: Terry Torres MD;  Location: [facility]    FLUOROSCOPIC GUIDANCE NEEDLE PLACEMENT  2/15/2013    Procedure: COCCYX;  Surgeon: Karlos Barron MD;  Location: [facility]    FLUOROSCOPIC GUIDANCE NEEDLE PLACEMENT  10/18/2013    Procedure: COCCYX;  Surgeon: Nima Jameson MD;  Location: [facility]     FLUOROSCOPIC GUIDANCE NEEDLE PLACEMENT  11/8/2013    Procedure: COCCYX;  Surgeon: Nima Jameson MD;  Location: [facility]    HYSTERECTOMY      partial, at age 34    INJ TENDON/LIGAMENT/CYST  9/25/2013    Procedure: COCCYX;  Surgeon: Nima Jameson MD;  Location: [facility]    INJ TENDON/LIGAMENT/CYST  10/18/2013    Procedure: COCCYX;  Surgeon: Nima Jameson MD;  Location: [facility]    INJ TENDON/LIGAMENT/CYST  11/8/2013    Procedure: COCCYX;  Surgeon: Nima Jameson MD;  Location: [facility]    INJECT NERV BLCK,PARAVERT SYMPATH N/A 11/21/2014    Procedure: STELLATE GANGLION INJECTION;  Surgeon: Nima Jameson MD;  Location: [facility]    INJECT NERV BLCK,PARAVERT SYMPATH N/A 12/12/2014    Procedure: STELLATE GANGLION INJECTION;  Surgeon: Nima Jameson MD;  Location: [facility]    INJECT NERV BLCK,PARAVERT SYMPATH N/A 12/29/2014    Procedure: LUMBAR SYMPATHETIC INJECTION;  Surgeon: Nima Jameson MD;  Location: [facility]    INJECT NERV BLCK,PARAVERT SYMPATH N/A 1/26/2015    Procedure: GANGLION IMPAR BLOCK;  Surgeon: Nima Jameson MD;  Location: [facility]    INJECT NERV BLCK,PARAVERT SYMPATH N/A 3/12/2015    Procedure: GANGLION IMPAR BLOCK;  Surgeon: Nima Jameson MD;  Location: [facility]    INJECT NERV BLCK,PARAVERT SYMPATH N/A 7/30/2015    Procedure: GANGLION IMPAR BLOCK;  Surgeon: Nima Jameson MD;  Location: [facility]    INJECT NERV BLCK,PARAVERT SYMPATH N/A 12/22/2015    Procedure: GANGLION IMPAR BLOCK;  Surgeon: Nima Jameson MD;  Location: [facility]    INJECT NERV BLCK,PARAVERT SYMPATH  1/11/2016    Procedure: ;  Surgeon: Nima Jameson MD;  Location: [facility]    INJECTION, ANESTHETIC/STEROID, TRANSFORAMINAL EPIDURAL; LUMBAR/SACRAL, SINGLE LEVEL   9/25/2013    Procedure: TRANSFORAMINAL EPIDURAL - LUMBAR;  Surgeon: Nima Jameson MD;  Location: [facility]    INJECTION, ANESTHETIC/STEROID, TRANSFORAMINAL EPIDURAL; LUMBAR/SACRAL, SINGLE LEVEL  10/18/2013    Procedure: TRANSFORAMINAL EPIDURAL - LUMBAR;  Surgeon: Nima Jameson MD;  Location: [facility]    INJECTION, W/WO CONTRAST, DX/THERAPEUTIC SUBSTANCE, EPIDURAL/SUBARACHNOID; LUMBAR/SACRAL  11/8/2013    Procedure: LUMBAR EPIDURAL;  Surgeon: Nima Jameson MD;  Location: [facility]    INJECTION, W/WO CONTRAST, DX/THERAPEUTIC SUBSTANCE, EPIDURAL/SUBARACHNOID; LUMBAR/SACRAL N/A 11/21/2014    Procedure: LUMBAR EPIDURAL;  Surgeon: Nima Jameson MD;  Location: [facility]    INJECTION, W/WO CONTRAST, DX/THERAPEUTIC SUBSTANCE, EPIDURAL/SUBARACHNOID; LUMBAR/SACRAL N/A 12/29/2014    Procedure: LUMBAR EPIDURAL;  Surgeon: Nima Jameson MD;  Location: [facility]    INJECTION, W/WO CONTRAST, DX/THERAPEUTIC SUBSTANCE, EPIDURAL/SUBARACHNOID; LUMBAR/SACRAL N/A 1/26/2015    Procedure: LUMBAR EPIDURAL;  Surgeon: Nima Jameson MD;  Location: [facility]    INJECTION, W/WO CONTRAST, DX/THERAPEUTIC SUBSTANCE, EPIDURAL/SUBARACHNOID; LUMBAR/SACRAL N/A 9/10/2015    Procedure: CAUDAL;  Surgeon: Nima Jameson MD;  Location: [facility]    INJECTION, W/WO CONTRAST, DX/THERAPEUTIC SUBSTANCE, EPIDURAL/SUBARACHNOID; LUMBAR/SACRAL N/A 11/30/2015    Procedure: CAUDAL;  Surgeon: Nima Jameson MD;  Location: [facility]    LAPAROSCOPIC CHOLECYSTECTOMY  4/2010    LAPAROSCOPY,DIAGNOSTIC  2002,2011    M-SEDAJ BY  PHYS PERFRMG SVC 5+ YR  3/8/2012    Procedure: COCCYX;  Surgeon: Karlso Barron MD;  Location: [facility]    M-SEDAJ BY  PHYS PERFRMG SVC 5+ YR  3/29/2012    Procedure: COCCYX;  Surgeon: Karlos Barron MD;  Location: [facility]    M-SEDAJ BY  PHYS PERFRMG SVC 5+ YR  1/25/2013    Procedure: COCCYX;  Surgeon: Terry Torres MD;  [facility]: [facility]    M-SEDAJ BY  PHYS PERFRMG SVC 5+ YR  2/15/2013    Procedure: COCCYX;  Surgeon: Karlos Barron MD;  Location: [facility]    M-SEDAJ BY  PHYS PERFRMG SVC 5+ YR  3/1/2013    Procedure: COCCYX;  Surgeon: Karlos Barron MD;  Location: [facility]    M-SEDAJ BY  PHYS PERFRMG SVC 5+ YR  9/25/2013    Procedure: COCCYX;  Surgeon: Nima Jameson MD;  Location: [facility]    M-SEDAJ BY  PHYS PERFRMG SVC 5+ YR  10/18/2013    Procedure: TRANSFORAMINAL EPIDURAL - LUMBAR;  Surgeon: Nima Jameson MD;  Location: [facility]    M-SEDAJ BY  PHYS PERFRMG SVC 5+ YR  11/8/2013    Procedure: LUMBAR EPIDURAL;  Surgeon: Nima Jameson MD;  Location: [facility]    M-SEDAJ BY  PHYS PERFRMG SVC 5+ YR N/A 11/21/2014    Procedure: STELLATE GANGLION INJECTION;  Surgeon: Nima Jameson MD;  Location: [facility]    M-SEDAJ BY  PHYS PERFRMG SVC 5+ YR N/A 12/12/2014    Procedure: STELLATE GANGLION INJECTION;  Surgeon: Nima Jameson MD;  Location: [facility]    M-SEDAJ BY  PHYS PERFRMG SVC 5+ YR N/A 12/29/2014    Procedure: LUMBAR EPIDURAL;  Surgeon: Nima Jameson MD;  Location: [facility]    M-SEDAJ BY  PHYS PERFRMG SVC 5+ YR N/A 1/26/2015    Procedure: GANGLION IMPAR BLOCK;  Surgeon: Nima Jameson MD;  Location: [facility]    M-SEDAJ BY  PHYS PERFRMG SVC 5+ YR N/A 3/12/2015    Procedure: GANGLION IMPAR BLOCK;  Surgeon: Nima Jameson MD;  Location: [facility]    M-SEDAJ BY [facility]MG [facility] 5+ YR  3/26/2015    Procedure: GANGLION IMPAR BLOCK;  Surgeon: Nima Jameson MD;  Location: [facility]    M-SEDAJ BY [facility]MG [facility] 5[facility] YR  7/30/2015     Procedure: ;  Surgeon: Nima Jameson MD;  Location: [facility]    M-SEDAJ BY  PHYS PERFRMG SVC 5+ YR N/A 9/10/2015    Procedure: CAUDAL;  Surgeon: Nima Jameson MD;  Location: [facility]    M-SEDAJ BY  PHYS PERFRMG SVC 5+ YR N/A 2015    Procedure: CAUDAL;  Surgeon: Nima Jameson MD;  Location: [facility]    M-SEDAJ BY  PHYS PERFRMG SVC 5+ YR N/A 2015    Procedure: GANGLION IMPAR BLOCK;  Surgeon: Nima Jameson MD;  Location: [facility]    M-SEDAJ BY  PHYS PERFRMG SVC 5+ YR  2016    Procedure: ;  Surgeon: Nima Jameson MD;  Location: [facility]    NERVOUS SYSTEM SURGERY UNLISTED  3/26/2015    Procedure: GANGLION IMPAR BLOCK;  Surgeon: Nima Jameson MD;  Location: [facility]          OTHER SURGICAL HISTORY      RT shoulder    OTHER SURGICAL HISTORY      Wrist     OTHER SURGICAL HISTORY      kidney stones    REPAIR EPIGASTRIC HERNIA,REDUC  2002    THORACOTOMY,REMV PULM FOREIGN BODY  2003    left lung mass-benign    TONSILLECTOMY      w/adenoids    VENTRAL HERNIA REPAIR  2012     Current Outpatient Medications   Medication Sig Dispense Refill    HYDROcodone-acetaminophen (NORCO)  MG Oral Tab Take 1 tablet by mouth every 8 (eight) hours as needed for Pain. 90 tablet 0    HYDROcodone-acetaminophen (NORCO)  MG Oral Tab Take 1 tablet by mouth every 6 (six) hours as needed for Pain. 30 tablet 0    metoprolol succinate ER 50 MG Oral Tablet 24 Hr TAKE 1 TABLET(50 MG) BY MOUTH DAILY 30 tablet 0    ondansetron (ZOFRAN) 4 mg tablet Take 1 tablet (4 mg total) by mouth every 8 (eight) hours as needed for Nausea. 20 tablet 0    lidocaine 5 % External Patch Place 1 patch onto the skin daily. 30 patch 0    atorvastatin 20 MG Oral Tab Take 1 tablet (20 mg total) by mouth nightly. 90 tablet 3    potassium chloride 20 MEQ Oral Tab CR Take 1 tablet (20 mEq total) by  mouth daily. 90 tablet 2    valACYclovir 1 G Oral Tab Take 1 tablet (1,000 mg total) by mouth 2 (two) times a day. (Patient taking differently: Take 1 tablet (1,000 mg total) by mouth as needed.) 180 tablet 0    Penciclovir (DENAVIR) 1 % External Cream Apply 1 Application topically every 2 (two) hours as needed. 5 g 0    Calcium Polycarbophil (FIBER) 625 MG Oral Tab Take by mouth.      Naloxone HCl 4 MG/0.1ML Nasal Liquid 4 mg by Nasal route as needed. If patient remains unresponsive, repeat dose in other nostril 2-5 minutes after first dose. (Patient not taking: Reported on 2024) 1 kit 0     Allergies   Allergen Reactions    Motrin [Ibuprofen]      Edema     Other      IVP dye -unknown reation     Family History   Problem Relation Age of Onset    Cancer Father         lung    Diabetes Maternal Grandfather      Social History     Occupational History    Not on file   Tobacco Use    Smoking status: Former     Packs/day: 1.00     Years: 32.00     Additional pack years: 0.00     Total pack years: 32.00     Types: Cigarettes     Quit date: 3/29/2008     Years since quittin.0    Smokeless tobacco: Never   Vaping Use    Vaping Use: Never used   Substance and Sexual Activity    Alcohol use: No     Alcohol/week: 0.0 standard drinks of alcohol    Drug use: No    Sexual activity: Never        Review of Systems (negative unless bolded):  General: fevers, chills, fatigue  CV:  chest pain, palpitations, leg swelling  Msk: bodyaches, neck pain, neck stiffness  Skin: rashes, open wounds, nonhealing ulcers  Hem: bleeds easily, bruise easily, immunocompromised  Neuro: dizziness, light headedness, headaches  Psych: anxious, depressed, anger issues    Attention: This note has been scribed by Dominga Reed under the supervision of Amilcar Dawn MD.      Amilcar Dawn MD   Hand, Wrist, & Elbow Surgery  [email or web address]  t: [phone]  f: [phone]

## 2024-04-16 DIAGNOSIS — G89.29 OTHER CHRONIC PAIN: ICD-10-CM

## 2024-04-16 DIAGNOSIS — G56.03 BILATERAL CARPAL TUNNEL SYNDROME: ICD-10-CM

## 2024-04-16 RX ORDER — ONDANSETRON 4 MG/1
4 TABLET, FILM COATED ORAL EVERY 8 HOURS PRN
Qty: 20 TABLET | Refills: 0 | Status: SHIPPED | OUTPATIENT
Start: 2024-04-16

## 2024-04-16 RX ORDER — LIDOCAINE 50 MG/G
1 PATCH TOPICAL DAILY
Qty: 30 PATCH | Refills: 0 | Status: SHIPPED | OUTPATIENT
Start: 2024-04-16

## 2024-04-16 NOTE — TELEPHONE ENCOUNTER
lidocaine 5 % External Patch   ondansetron (ZOFRAN) 4 mg   HYDROcodone-acetaminophen (NORCO)  MG     [id number] [place], [place] - [address] AT [facility] [address], [phone], [phone]

## 2024-04-17 RX ORDER — LIDOCAINE 50 MG/G
1 PATCH TOPICAL DAILY
Qty: 30 PATCH | Refills: 0 | Status: CANCELLED | OUTPATIENT
Start: 2024-04-17

## 2024-04-17 RX ORDER — ONDANSETRON 4 MG/1
4 TABLET, FILM COATED ORAL EVERY 8 HOURS PRN
Qty: 20 TABLET | Refills: 0 | Status: CANCELLED | OUTPATIENT
Start: 2024-04-17

## 2024-04-17 NOTE — TELEPHONE ENCOUNTER
Pt came into office to update insurance and wanted to request for this medication to be expedited as she is leaving out of town 4/18/24 at 1PM

## 2024-04-17 NOTE — TELEPHONE ENCOUNTER
M for patient informing her of the following message as well as informing her that her request for Norco has been sent to  for review     Spoke with pharmacy--patient has Zofran ready at the pharmacy--also has script for Lidocaine patches but there is an issue with the insurance (states it is most likely a PA needed) and there is no refill on file for Norco so a new script will need to be sent     PA Submitted through Cover My Meds Radha Jones (Cates: [id number])    Requesting    HYDROcodone-acetaminophen (NORCO)  MG Oral Tab         Sig: Take 1 tablet by mouth every 8 (eight) hours as needed for Pain.    Disp: 90 tablet    Refills: 0    Start: 4/17/2024    Earliest Fill Date: 4/17/2024    Class: Normal    Non-formulary For: Other chronic pain    Last ordered: 1 month ago (3/15/2024) by Julian Alanis MD    Controlled Substance Medication [id number]/17/2024 03:44 PM    This medication is a controlled substance - forward to provider to refill       ondansetron (ZOFRAN) 4 mg tablet         Sig: Take 1 tablet (4 mg total) by mouth every 8 (eight) hours as needed for Nausea.    Disp: 20 tablet    Refills: 0    Start: 4/17/2024    Class: Normal    Non-formulary    Last ordered: Yesterday (4/16/2024) by Julian Alanis MD        lidocaine 5 % External Patch         Sig: Place 1 patch onto the skin daily.    Disp: 30 patch    Refills: 0    Start: 4/17/2024    Class: Normal    Non-formulary    Last ordered: Yesterday (4/16/2024) by Julian Alanis MD    Non-Narcotic Pain Medication Protocol [id number]/17/2024 03:44 PM   Protocol Details In person appointment or virtual visit in the past 6 mos or appointment in next 3 mos        LOV: 2/15/2024  RTC: none noted   Last Relevant Labs: n/a   Filled: 3/15/2024 #90 with 0 refills    Future Appointments   Date Time Provider Department Center   5/13/2024 11:15 AM [facility]ilcar Dawn MD [id number]

## 2024-04-18 RX ORDER — HYDROCODONE BITARTRATE AND ACETAMINOPHEN 10; 325 MG/1; MG/1
1 TABLET ORAL EVERY 8 HOURS PRN
Qty: 90 TABLET | Refills: 0 | Status: SHIPPED | OUTPATIENT
Start: 2024-04-18

## 2024-04-25 NOTE — PROGRESS NOTES
OT Discharge Note  Patient was last seen 3/27/24 in Occupational Therapy.  At that time, her insurance ran out and she elected not to return until she had that reinstated.  Per the policy, she has not been seen since that date, is not scheduled for any additional OT, and now will be discharged from OT.  A re-eval will be performed if she returns with new orders.  Status is as noted in last note on that date.  Indira Taveras, VERONICA, OTR/L, CHT

## 2024-04-28 DIAGNOSIS — E78.00 PURE HYPERCHOLESTEROLEMIA: ICD-10-CM

## 2024-04-30 RX ORDER — ATORVASTATIN CALCIUM 20 MG/1
20 TABLET, FILM COATED ORAL NIGHTLY
Qty: 90 TABLET | Refills: 3 | Status: SHIPPED | OUTPATIENT
Start: 2024-04-30

## 2024-04-30 NOTE — TELEPHONE ENCOUNTER
Name from pharmacy: ATORVASTATIN 20MG TABLETS         Will file in chart as: ATORVASTATIN 20 MG Oral Tab    Sig: TAKE 1 TABLET(20 MG) BY MOUTH EVERY NIGHT    Disp: 90 tablet    Refills: 3 (Pharmacy requested: Not specified)    Start: 4/28/2024    Class: Normal    Non-formulary For: Pure hypercholesterolemia    Last ordered: 1 year ago (2/2/2023) by Julian Alanis MD    Last refill: 1/31/2024    Rx #: [id number]    Cholesterol Medication Protocol [id number]/28/2024 03:15 PM   Protocol Details ALT < 80    ALT resulted within past year    Lipid panel within past 12 months    In person appointment or virtual visit in the past 12 mos or appointment in next 3 mos      To be filled at: [id number] [place], [place] - [facility] [address] AT [facility] [address], [phone], [phone]

## 2024-05-13 ENCOUNTER — OFFICE VISIT (OUTPATIENT)
Dept: ORTHOPEDICS CLINIC | Facility: CLINIC | Age: 64
End: 2024-05-13

## 2024-05-13 VITALS — WEIGHT: 182 LBS | BODY MASS INDEX: 30.32 KG/M2 | HEIGHT: 65 IN

## 2024-05-13 DIAGNOSIS — G56.01 CARPAL TUNNEL SYNDROME OF RIGHT WRIST: ICD-10-CM

## 2024-05-13 DIAGNOSIS — G56.02 CARPAL TUNNEL SYNDROME OF LEFT WRIST: ICD-10-CM

## 2024-05-13 DIAGNOSIS — G56.21 CUBITAL TUNNEL SYNDROME ON RIGHT: Primary | ICD-10-CM

## 2024-05-13 PROCEDURE — 99024 POSTOP FOLLOW-UP VISIT: CPT | Performed by: ORTHOPAEDIC SURGERY

## 2024-05-13 PROCEDURE — 3008F BODY MASS INDEX DOCD: CPT | Performed by: ORTHOPAEDIC SURGERY

## 2024-05-13 NOTE — PROGRESS NOTES
Clinic Note     Assessment/Plan:  63 year old female    Left elbow cubital tunnel in situ release with endoscopic carpal tunnel release index middle and ring finger A1 pulley release 2/16/2024-  No significant improvement with cramping noticed of her left hand possible alternative explanation is cervical radiculopathy/stenosis however patient would like to defer work-up. She is currently unable to obtain an MRI. Her CT scan of her cervical spine from 2022 indicates spinal canal stenosis at C5-C6.   CMC arthritis right side : She is on blood thinners and can't take advil and aleve. She reports that he can't take ibruprofen due to swelling. She can use voltaren and CBD for pain relief. Rx medrol dosepak    Follow Up: 6-8 weeks    Diagnostic Studies:  X-rays are negative for acute fracture dislocation or deformity  EMG/NCV: Bilateral carpal tunnel syndrome with moderate to severe symptoms in the right hand moderate in the left.  Left ulnar mononeuropathy nonspecific    Physical Exam:    Ht 5' 5\" (1.651 m)   Wt 182 lb (82.6 kg)   BMI 30.29 kg/m²     Constitutional: NAD. AOx3. Well-developed and Well-nourished.   Psychiatric: Normal mood/ affect/ behavior. Judgment and thought content normal.     Left upper Extremity:   Inspection: Skin Intact. No skin lesions. No gross deformity.   Palpation: Mildly tender over the incisions   Motion: Elbow: normal bilateral symmetric ext/flex  Wrist: normal bilateral symmetric ext/flex/sup/pro  Finger: 1 cm tip to palm passively correctable     Sensory: Positive Tinel's at the wrist.    CC: Post-Op (LEFT INDEX & RING FINGER RELEASE, LEFT CTR; LT CUTR; DOS: 2/16/24)    HPI: This 63 year old female presents with complaints of pain to her left hand.  She states there is cramping and stiffness.  Is been ongoing since April.  She has throbbing pains along the wrist extending to the fingertips.  Of note she did have bilateral carpal tunnel releases done in 2014.  She did have complete  resolution of her symptoms at that point.  But last June 2022 her symptoms started of pain in the wrist as well as numbness and tingling.  She underwent FCR tenosynovectomy on her right wrist by another provider in January 2023.  She is now almost 8 months out from surgery and states she does not feel any better and still has pain along the right wrist.  Today she is requesting be seen mostly for the left hand.  She has numbness and tingling extending into the fingertips on all the fingers.  She has cramping and pain that limits her from doing her job.  She was off work following surgery on her right side and went back to work light duty.  During her light duty she was doing a lot of repetitive use where she felt like her left hand pain was becoming more of an issue.  She did have an ROSALIND more recently but it is unclear if that was for her right or left side.    Interval Hx (11/6/23): Patient underwent index middle and ring finger A1 pulley release as well as endoscopic carpal tunnel release and cubital tunnel in situ release.  Her postop pain is improving     Interval Hx (4/8/24): Patient is in office for a follow-up regarding reoccurring pain within her left index and ring finger pain. Patient is concerned that her fingers get very swollen. Slight numbness and constant pain. She describes the pain \"someone hitting with hammer\".    H/o Neuropathy in her feet. Hurts her to wear shoes.    Interval Hx (5/13/24): Patient is in office for a post-surgical visit with continuous pain and limited relief. Patient complains she has trouble typing and her fingers constantly hurt as she actively moves around doing her day to day activities. She mentions she feels a cramping/throbbing sensation that tends to travel up into her arm. She has unable to find relief and is in office for further evaluation.    Past Medical History:    Arthritis    Back pain    Calculus of kidney    Carpal tunnel syndrome    Log Date: 11/28/2012      Cervical stenosis of spine    COPD (chronic obstructive pulmonary disease) (HCC)    Degeneration of lumbar or lumbosacral intervertebral disc    DJD (degenerative joint disease) of hip    Bilateral     Esophageal reflux    Essential hypertension    Follicular cyst of ovary    left    Heartburn    High blood pressure    High cholesterol    Hx of motion sickness    Hyperlipidemia    Leg swelling    Migraines    Neuropathy    LUQ    Opiate use    Osteoporosis    Other chronic pain    Presence of other cardiac implants and grafts    Recurrent pneumonia    Renal stones    Rib fracture    Sciatica    Spinal stenosis, lumbar region, without neurogenic claudication    Trigger finger of right thumb    Trigger finger, left middle finger    Trigger finger, right index finger    Trigger finger, right ring finger    Varicose vein    Ventral hernia    Visual impairment    glasses    Wears glasses     Past Surgical History:   Procedure Laterality Date    Arthrocentesis aspir&/inj major jt/bursa w/us N/A 3/26/2015    Procedure: HIP INJECTION (PAIN);  Surgeon: Nima Jameson MD;  Location: [facility]IN [facility]    Arthrocentesis aspir&/inj major jt/bursa w/us  7/30/2015    Procedure: ;  Surgeon: Nima Jameson MD;  Location: [facility] PAIN [facility]    Carpal tunnel release      08/2014    Colonoscopy  2010    Colonoscopy N/A 11/12/2014    Procedure: COLONOSCOPY;  Surgeon: Valentin Ramos MD;  Location:  ENDOSCOPY    D & c      Drain/inject medium joint/bursa  3/8/2012    Procedure: COCCYX;  Surgeon: Karlos Barron MD;  Location: [facility]    Drain/inject medium joint/bursa  3/29/2012    Procedure: COCCYX;  Surgeon: Karlos Barron MD;  Location: [facility]IN [facility]    Drain/inject medium joint/bursa  3/29/2012    Procedure: COCCYX;  Surgeon: Karlos Barron MD;  Location: [facility]    Drain/inject medium joint/bursa  1/25/2013    Procedure: COCCYX;  Surgeon:  Terry Torres MD;  [facility]    Drain/inject medium joint/bursa  2/15/2013    Procedure: COCCYX;  Surgeon: Karlos Barron MD;  Location: [facility]    Drain/inject medium joint/bursa  2/15/2013    Procedure: COCCYX;  Surgeon: Karlos Barron MD;  Location: [facility]    Drain/inject medium joint/bursa  3/1/2013    Procedure: COCCYX;  Surgeon: Karlos Barron MD;  Location: [facility]    Drain/inject medium joint/bursa  3/1/2013    Procedure: COCCYX;  Surgeon: Karlos Barron MD;  Location: [facility] [place] [facility]    Fluor gid & loclzj ndl/cath spi dx/ther njx  11/8/2013    Procedure: COCCYX;  Surgeon: Nima Jameson MD;  Location: [facility] [place] [facility]    Fluor gid & loclzj ndl/cath spi dx/ther njx N/A 11/21/2014    Procedure: STELLATE GANGLION INJECTION;  Surgeon: Nima Jameson MD;  Location: [facility] [place] [facility]    Fluor gid & loclzj ndl/cath spi dx/ther njx N/A 12/29/2014    Procedure: LUMBAR EPIDURAL;  Surgeon: Nima Jameson MD;  Location: [facility] [place] [facility]    Fluor gid & loclzj ndl/cath spi dx/ther njx N/A 1/26/2015    Procedure: GANGLION IMPAR BLOCK;  Surgeon: Nima Jameson MD;  Location: [facility] [place] [facility]    Fluor gid & loclzj ndl/cath spi dx/ther njx N/A 9/10/2015    Procedure: CAUDAL;  Surgeon: Nima Jameson MD;  Location: [facility] [place] [facility]    Fluor gid & loclzj ndl/cath spi dx/ther njx N/A 11/30/2015    Procedure: CAUDAL;  Surgeon: Nima Jameson MD;  Location: [facility] [place] [facility]    Fluoroscopic guidance needle placement  3/8/2012    Procedure: COCCYX;  Surgeon: Karlos Barron MD;  Location: [facility] [place] [facility]    Fluoroscopic guidance needle placement  3/29/2012    Procedure: COCCYX;  Surgeon: Karlos Barron MD;  Location: [facility] [place] [facility]    Fluoroscopic guidance needle placement  1/25/2013     Procedure: COCCYX;  Surgeon: Terry Torres MD;  [place]: [facility] [place] [facility]    Fluoroscopic guidance needle placement  2/15/2013    Procedure: COCCYX;  Surgeon: Karlos Barron MD;  Location: [facility]    Fluoroscopic guidance needle placement  10/18/2013    Procedure: COCCYX;  Surgeon: Nima Jameson MD;  Location: [facility]    Fluoroscopic guidance needle placement  11/8/2013    Procedure: COCCYX;  Surgeon: Nima Jameson MD;  Location: [facility]    Hysterectomy      partial, at age 34    Inj tendon/ligament/cyst  9/25/2013    Procedure: COCCYX;  Surgeon: Nima Jameson MD;  Location: [facility]    Inj tendon/ligament/cyst  10/18/2013    Procedure: COCCYX;  Surgeon: Nima Jameson MD;  Location: [facility]    Inj tendon/ligament/cyst  11/8/2013    Procedure: COCCYX;  Surgeon: Nima Jameson MD;  [place]: [facility] [place] [facility]    Inject nerv blck,paravert sympath N/A 11/21/2014    Procedure: STELLATE GANGLION INJECTION;  Surgeon: Nima Jameson MD;  Location: [facility]    Inject nerv blck,paravert sympath N/A 12/12/2014    Procedure: STELLATE GANGLION INJECTION;  Surgeon: Nima Jameson MD;  Location: [facility]    Inject nerv blck,paravert sympath N/A 12/29/2014    Procedure: LUMBAR SYMPATHETIC INJECTION;  Surgeon: Nima Jameson MD;  [place]: [facility] [place] [facility]    Inject nerv blck,paravert sympath N/A 1/26/2015    Procedure: GANGLION IMPAR BLOCK;  Surgeon: Nima Jameson MD;  [place]: [facility] [place] [facility]    Inject nerv blck,paravert sympath N/A 3/12/2015    Procedure: GANGLION IMPAR BLOCK;  Surgeon: Nima Jameson MD;  Location: [facility]    Inject nerv blck,paravert sympath N/A 7/30/2015    Procedure: GANGLION IMPAR BLOCK;  Surgeon: Nima Jameson MD;  Location: [facility] FOR PAIN  MANAG[facility]    Inject nerv blck,paravert sympath N/A 12/22/2015    Procedure: GANGLION IMPAR BLOCK;  Surgeon: Nima Jameson MD;  Location: [facility]    Inject nerv blck,paravert sympath  1/11/2016    Procedure: ;  Surgeon: Nima Jameson MD;  Location: [facility]    Injection, anesthetic/steroid, transforaminal epidural; lumbar/sacral, single level  9/25/2013    Procedure: TRANSFORAMINAL EPIDURAL - LUMBAR;  Surgeon: Nima Jameson MD;  Location: [facility]    Injection, anesthetic/steroid, transforaminal epidural; lumbar/sacral, single level  10/18/2013    Procedure: TRANSFORAMINAL EPIDURAL - LUMBAR;  Surgeon: Nima Jameson MD;  Location: [facility]    Injection, w/wo contrast, dx/therapeutic substance, epidural/subarachnoid; lumbar/sacral  11/8/2013    Procedure: LUMBAR EPIDURAL;  Surgeon: Nima Jameson MD;  Location: [facility]    Injection, w/wo contrast, dx/therapeutic substance, epidural/subarachnoid; lumbar/sacral N/A 11/21/2014    Procedure: LUMBAR EPIDURAL;  Surgeon: Nima Jameson MD;  Location: [facility]    Injection, w/wo contrast, dx/therapeutic substance, epidural/subarachnoid; lumbar/sacral N/A 12/29/2014    Procedure: LUMBAR EPIDURAL;  Surgeon: Nima Jameson MD;  Location: [facility]    Injection, w/wo contrast, dx/therapeutic substance, epidural/subarachnoid; lumbar/sacral N/A 1/26/2015    Procedure: LUMBAR EPIDURAL;  Surgeon: Nima Jameson MD;  Location: [facility]    Injection, w/wo contrast, dx/therapeutic substance, epidural/subarachnoid; lumbar/sacral N/A 9/10/2015    Procedure: CAUDAL;  Surgeon: Nima Jameson MD;  Location: [facility]    Injection, w/wo contrast, dx/therapeutic substance, epidural/subarachnoid; lumbar/sacral N/A 11/30/2015    Procedure: CAUDAL;  Surgeon: Nima Jameson MD;  Location:  [facility]    Laparoscopic cholecystectomy  4/2010    Laparoscopy,diagnostic  2002,2011    M-sedaj by  phys perfrmg svc 5+ yr  3/8/2012    Procedure: COCCYX;  Surgeon: Karlos Barron MD;  Location: [facility]    M-sedaj by  phys perfrmg svc 5+ yr  3/29/2012    Procedure: COCCYX;  Surgeon: Karlos Barron MD;  Location: [facility]    M-sedaj by  phys perfrmg svc 5+ yr  1/25/2013    Procedure: COCCYX;  Surgeon: Terry Torres MD;  Location: [facility]    M-sedaj by  phys perfrmg svc 5+ yr  2/15/2013    Procedure: COCCYX;  Surgeon: Karlos Barron MD;  Location: [facility]    M-sedaj by  phys perfrmg svc 5+ yr  3/1/2013    Procedure: COCCYX;  Surgeon: Karlos Barron MD;  Location: [facility]    M-sedaj by  phys perfrmg svc 5+ yr 9/25/2013    Procedure: COCCYX;  Surgeon: Nima Jameson MD;  Location: [facility]    M-sedaj by  phys perfrmg svc 5+ yr  10/18/2013    Procedure: TRANSFORAMINAL EPIDURAL - LUMBAR;  Surgeon: Nima Jameson MD;  Location: [facility]    M-sedaj by  phys perfrmg svc 5+ yr  11/8/2013    Procedure: LUMBAR EPIDURAL;  Surgeon: Nima Jameson MD;  Location: [facility]    M-sedaj by  phys perfrmg svc 5+ yr N/A 11/21/2014    Procedure: STELLATE GANGLION INJECTION;  Surgeon: Nima Jameson MD;  Location: [facility]    M-sedaj by  phys perfrmg svc 5+ yr N/A 12/12/2014    Procedure: STELLATE GANGLION INJECTION;  Surgeon: Nima Jameson MD;  Location: [facility]    M-sedaj by  phys perfrmg svc 5+ yr N/A 12/29/2014    Procedure: LUMBAR EPIDURAL;  Surgeon: Nima Jameson MD;  Location: [facility]    [facility]A 1/26/2015    Procedure: GANGLION IMPAR BLOCK;  Surgeon: Nima Jameson MD;  Location: [facility] FOR  PAIN MANAGEMENT    M-sedaj by  phys perfrmg svc 5+ yr N/A 3/12/2015    Procedure: GANGLION IMPAR BLOCK;  Surgeon: Nima Jameson MD;  [place] [facility]    M-sedaj by  phys perfrmg svc 5+ yr  3/26/2015    Procedure: GANGLION IMPAR BLOCK;  Surgeon: Nima Jameson MD;  [place] [facility]    M-sedaj by  phys perfrmg svc 5+ yr  2015    Procedure: ;  Surgeon: Nima Jameson MD;  Location: [facility] PAIN [facility]    M-sedaj by  phys perfrmg svc 5+ yr N/A 9/10/2015    Procedure: CAUDAL;  Surgeon: Nima Jameson MD;  Location: [facility] FOR PAIN [facility]    M-sedaj by  phys perfrmg svc 5+ yr N/A 2015    Procedure: CAUDAL;  Surgeon: Nima Jameson MD;  Location: [facility]IN [facility]    M-sedaj by  phys perfrmg svc 5+ yr N/A 2015    Procedure: GANGLION IMPAR BLOCK;  Surgeon: Nima Jameson MD;  Location: [facility] PAIN [facility]    M-sedaj by  phys perfrmg svc 5+ yr  2016    Procedure: ;  Surgeon: Nima Jameson MD;  Location: [facility]IN [facility]    Nervous system surgery unlisted  3/26/2015    Procedure: GANGLION IMPAR BLOCK;  Surgeon: Nima Jameson MD;  Location: [facility]IN [facility]          Other surgical history      RT shoulder    Other surgical history      Wrist     Other surgical history      kidney stones    Repair epigastric hernia,reduc  2002    Thoracotomy,remv pulm foreign body      left lung mass-benign    Tonsillectomy      w/adenoids    Ventral hernia repair  2012     Current Outpatient Medications   Medication Sig Dispense Refill    atorvastatin 20 MG Oral Tab Take 1 tablet (20 mg total) by mouth nightly. 90 tablet 3    HYDROcodone-acetaminophen (NORCO)  MG Oral Tab Take 1 tablet by mouth every 8 (eight) hours as needed for Pain. 90 tablet 0    lidocaine 5 % External Patch Place 1 patch onto the skin daily. 30 patch 0    ondansetron (ZOFRAN) 4 mg tablet Take 1  tablet (4 mg total) by mouth every 8 (eight) hours as needed for Nausea. 20 tablet 0    methylPREDNISolone (MEDROL) 4 MG Oral Tablet Therapy Pack As directed. 1 each 0    HYDROcodone-acetaminophen (NORCO)  MG Oral Tab Take 1 tablet by mouth every 6 (six) hours as needed for Pain. 30 tablet 0    metoprolol succinate ER 50 MG Oral Tablet 24 Hr TAKE 1 TABLET(50 MG) BY MOUTH DAILY 30 tablet 0    potassium chloride 20 MEQ Oral Tab CR Take 1 tablet (20 mEq total) by mouth daily. 90 tablet 2    valACYclovir 1 G Oral Tab Take 1 tablet (1,000 mg total) by mouth 2 (two) times a day. (Patient taking differently: Take 1 tablet (1,000 mg total) by mouth as needed.) 180 tablet 0    Penciclovir (DENAVIR) 1 % External Cream Apply 1 Application topically every 2 (two) hours as needed. 5 g 0    Calcium Polycarbophil (FIBER) 625 MG Oral Tab Take by mouth.      Naloxone HCl 4 MG/0.1ML Nasal Liquid 4 mg by Nasal route as needed. If patient remains unresponsive, repeat dose in other nostril 2-5 minutes after first dose. (Patient not taking: Reported on 2024) 1 kit 0     Allergies   Allergen Reactions    Motrin [Ibuprofen]      Edema     Other      IVP dye -unknown reation     Family History   Problem Relation Age of Onset    Cancer Father         lung    Diabetes Maternal Grandfather      Social History     Occupational History    Not on file   Tobacco Use    Smoking status: Former     Current packs/day: 0.00     Average packs/day: 1 pack/day for 32.0 years (32.0 ttl pk-yrs)     Types: Cigarettes     Start date: 3/29/1976     Quit date: 3/29/2008     Years since quittin.1    Smokeless tobacco: Never   Vaping Use    Vaping status: Never Used   Substance and Sexual Activity    Alcohol use: No     Alcohol/week: 0.0 standard drinks of alcohol    Drug use: No    Sexual activity: Never      Review of Systems (negative unless bolded):  General: fevers, chills, fatigue  CV:  chest pain, palpitations, leg swelling  Msk: bodyaches,  neck pain, neck stiffness  Skin: rashes, open wounds, nonhealing ulcers  Hem: bleeds easily, bruise easily, immunocompromised  Neuro: dizziness, light headedness, headaches  Psych: anxious, depressed, anger issues    Attention: This note has been scribed by Dominga Reed under the supervision of Amilcar Dawn MD.      Amilcar Dawn MD   Hand, Wrist, & Elbow Surgery  [email or web address]  t: [phone]  f: [phone]

## 2024-05-13 NOTE — PROGRESS NOTES
Clinic Note     Assessment/Plan:  63 year old female    Left elbow cubital tunnel in situ release with endoscopic carpal tunnel release index middle and ring finger A1 pulley release 2/16/2024-  continue to increase activities tolerated. HEP. Scar sensitivity will continue to improve. Continue to follow for sensory/motor improvement.  If patient fails to make any significant improvement in the cramping in her hands would recommend exploration for cervical pathology.  She previously has a CT scan that shows multilevel degenerative disc disease with possible spinal stenosis on CT scan.  She does have a spinal cord stimulator which does not preclude a MRI of her cervical spine.  She will message me and we will be referred to Dr. Thakur  CMC arthritis right side : She is on blood thinners and can't take advil and aleve. She reports that he can't take ibruprofen due to swelling. She can use voltaren and CBD for pain relief. Rx medrol dosepak    Follow Up: 6-8 weeks    Diagnostic Studies:  X-rays are negative for acute fracture dislocation or deformity  EMG/NCV: Bilateral carpal tunnel syndrome with moderate to severe symptoms in the right hand moderate in the left.  Left ulnar mononeuropathy nonspecific    Physical Exam:    Ht 5' 5\" (1.651 m)   Wt 182 lb (82.6 kg)   BMI 30.29 kg/m²     Constitutional: NAD. AOx3. Well-developed and Well-nourished.   Psychiatric: Normal mood/ affect/ behavior. Judgment and thought content normal.     Left upper Extremity:   Inspection: Skin Intact. No skin lesions. No gross deformity.   Palpation: Mildly tender over the incisions   Motion: Elbow: normal bilateral symmetric ext/flex  Wrist: normal bilateral symmetric ext/flex/sup/pro  Finger: 1 cm tip to palm passively correctable     Sensory: Positive Tinel's at the wrist.    CC: Post-Op (LEFT INDEX & RING FINGER RELEASE, LEFT CTR; LT CUTR; DOS: 2/16/24)    HPI: This 63 year old female presents with complaints of pain to her left hand.   She states there is cramping and stiffness.  Is been ongoing since April.  She has throbbing pains along the wrist extending to the fingertips.  Of note she did have bilateral carpal tunnel releases done in 2014.  She did have complete resolution of her symptoms at that point.  But last June 2022 her symptoms started of pain in the wrist as well as numbness and tingling.  She underwent FCR tenosynovectomy on her right wrist by another provider in January 2023.  She is now almost 8 months out from surgery and states she does not feel any better and still has pain along the right wrist.  Today she is requesting be seen mostly for the left hand.  She has numbness and tingling extending into the fingertips on all the fingers.  She has cramping and pain that limits her from doing her job.  She was off work following surgery on her right side and went back to work light duty.  During her light duty she was doing a lot of repetitive use where she felt like her left hand pain was becoming more of an issue.  She did have an ROSALIND more recently but it is unclear if that was for her right or left side.    Interval history: Patient underwent index middle and ring finger A1 pulley release as well as endoscopic carpal tunnel release and cubital tunnel in situ release.  Her postop pain is improving     Interval history (4/8/24): Patient is in office for a follow-up regarding reoccurring pain within her left index and ring finger pain. Patient is concerned that her fingers get very swollen. Slight numbness and constant pain. She describes the pain \"someone hitting with hammer\".    H/o Neuropathy in her feet. Hurts her to wear shoes.    Interval history (5/12/24): Patient mentions pain and locking/cramping has been consistent when engaging in daily activities along with tenderness. She mentions her fingers constantly hurt and she activity moves them around to help. The pain gets worse and feels sort of like a cramping sensation that  travels up.     Past Medical History:    Arthritis    Back pain    Calculus of kidney    Carpal tunnel syndrome    Log Date: 11/28/2012     Cervical stenosis of spine    COPD (chronic obstructive pulmonary disease) (HCC)    Degeneration of lumbar or lumbosacral intervertebral disc    DJD (degenerative joint disease) of hip    Bilateral     Esophageal reflux    Essential hypertension    Follicular cyst of ovary    left    Heartburn    High blood pressure    High cholesterol    Hx of motion sickness    Hyperlipidemia    Leg swelling    Migraines    Neuropathy    LUQ    Opiate use    Osteoporosis    Other chronic pain    Presence of other cardiac implants and grafts    Recurrent pneumonia    Renal stones    Rib fracture    Sciatica    Spinal stenosis, lumbar region, without neurogenic claudication    Trigger finger of right thumb    Trigger finger, left middle finger    Trigger finger, right index finger    Trigger finger, right ring finger    Varicose vein    Ventral hernia    Visual impairment    glasses    Wears glasses     Past Surgical History:   Procedure Laterality Date    Arthrocentesis aspir&/inj major jt/bursa w/us N/A 3/26/2015    Procedure: HIP INJECTION (PAIN);  Surgeon: Nima Jameson MD;  Location: [facility]    Arthrocentesis aspir&/inj major jt/bursa w/us  7/30/2015    Procedure: ;  Surgeon: Nima Jameson MD;  Location: [facility]    Carpal tunnel release      08/2014    Colonoscopy  2010    Colonoscopy N/A 11/12/2014    Procedure: COLONOSCOPY;  Surgeon: Valentin Ramos MD;  Location:  ENDOSCOPY    D & c      Drain/inject medium joint/bursa  3/8/2012    Procedure: COCCYX;  Surgeon: Karlos Barron MD;  Location: [facility]    Drain/inject medium joint/bursa  3/29/2012    Procedure: COCCYX;  Surgeon: Karlos Barron MD;  Location: [facility]    Drain/inject medium joint/bursa  3/29/2012    Procedure: COCCYX;  Surgeon:  Karlos Barron MD;  [facility] [facility]    Drain/inject medium joint/bursa  1/25/2013    Procedure: COCCYX;  Surgeon: Terry Torres MD;  [facility] [facility]    Drain/inject medium joint/bursa  2/15/2013    Procedure: COCCYX;  Surgeon: Karlos Barron MD;  [facility] [facility]    Drain/inject medium joint/bursa  2/15/2013    Procedure: COCCYX;  Surgeon: Karlos Barron MD;  [facility] [facility]    Drain/inject medium joint/bursa  3/1/2013    Procedure: COCCYX;  Surgeon: Karlos Barron MD;  [facility] [facility]    Drain/inject medium joint/bursa  3/1/2013    Procedure: COCCYX;  Surgeon: Karlos Barron MD;  [facility] [facility]    Fluor gid & loclzj ndl/cath spi dx/ther njx  11/8/2013    Procedure: COCCYX;  Surgeon: Nima Jameson MD;  [facility]: [facility]    Fluor gid & loclzj ndl/cath spi dx/ther njx N/A 11/21/2014    Procedure: STELLATE GANGLION INJECTION;  Surgeon: Nima Jameson MD;  [facility]: [facility]    Fluor gid & loclzj ndl/cath spi dx/ther njx N/A 12/29/2014    Procedure: LUMBAR EPIDURAL;  Surgeon: Nima Jameson MD;  [facility]: [facility]    Fluor gid & loclzj ndl/cath spi dx/ther njx N/A 1/26/2015    Procedure: GANGLION IMPAR BLOCK;  Surgeon: Nima Jameson MD;  [facility]: [facility]    Fluor gid & loclzj ndl/cath spi dx/ther njx N/A 9/10/2015    Procedure: CAUDAL;  Surgeon: Nima Jameson MD;  [facility]: [facility]    Fluor gid & loclzj ndl/cath spi dx/ther njx N/A 11/30/2015    Procedure: CAUDAL;  Surgeon: Nima Jameson MD;  [facility]: [facility]    Fluoroscopic guidance needle placement  3/8/2012    Procedure: COCCYX;  Surgeon: Karlos Barron MD;  [facility]: [facility]    Fluoroscopic guidance needle placement  3/29/2012    Procedure:  COCCYX;  Surgeon: Karlos Barron MD;  [place] [facility]    Fluoroscopic guidance needle placement  1/25/2013    Procedure: COCCYX;  Surgeon: Terry Torres MD;  Location: [facility]    Fluoroscopic guidance needle placement  2/15/2013    Procedure: COCCYX;  Surgeon: Karlos Barron MD;  Location: [facility]    Fluoroscopic guidance needle placement  10/18/2013    Procedure: COCCYX;  Surgeon: Nima Jameson MD;  Location: [facility]    Fluoroscopic guidance needle placement  11/8/2013    Procedure: COCCYX;  Surgeon: Nima Jameson MD;  Location: [facility]    Hysterectomy      partial, at age 34    Inj tendon/ligament/cyst  9/25/2013    Procedure: COCCYX;  Surgeon: Nima Jameson MD;  Location: [facility]    Inj tendon/ligament/cyst  10/18/2013    Procedure: COCCYX;  Surgeon: Nima Jameson MD;  Location: [facility]    Inj tendon/ligament/cyst  11/8/2013    Procedure: COCCYX;  Surgeon: Nima Jameson MD;  Location: [facility]    Inject nerv blck,paravert sympath N/A 11/21/2014    Procedure: STELLATE GANGLION INJECTION;  Surgeon: Nima Jameson MD;  Location: [facility]    Inject nerv blck,paravert sympath N/A 12/12/2014    Procedure: STELLATE GANGLION INJECTION;  Surgeon: Nima Jameson MD;  Location: [facility]    Inject nerv blck,paravert sympath N/A 12/29/2014    Procedure: LUMBAR SYMPATHETIC INJECTION;  Surgeon: Nima Jameson MD;  Location: [facility]    Inject nerv blck,paravert sympath N/A 1/26/2015    Procedure: GANGLION IMPAR BLOCK;  Surgeon: Nima Jameson MD;  Location: [facility]    Inject nerv blck,paravert sympath N/A 3/12/2015    Procedure: GANGLION IMPAR BLOCK;  Surgeon: Nima Jameson MD;  Location: [facility]    Inject nerv  blck,paravert sympath N/A 7/30/2015    Procedure: GANGLION IMPAR BLOCK;  Surgeon: Nima Jameson MD;  [place]    Inject nerv blck,paravert sympath N/A 12/22/2015    Procedure: GANGLION IMPAR BLOCK;  Surgeon: Nima Jameson MD;  [facility]: [facility]    Inject nerv blck,paravert sympath  1/11/2016    Procedure: ;  Surgeon: Nima Jameson MD;  [facility]: [facility]    Injection, anesthetic/steroid, transforaminal epidural; lumbar/sacral, single level  9/25/2013    Procedure: TRANSFORAMINAL EPIDURAL - LUMBAR;  Surgeon: Nima Jameson MD;  Location: [facility]    Injection, anesthetic/steroid, transforaminal epidural; lumbar/sacral, single level  10/18/2013    Procedure: TRANSFORAMINAL EPIDURAL - LUMBAR;  Surgeon: Nima Jameson MD;  Location: [facility]    Injection, w/wo contrast, dx/therapeutic substance, epidural/subarachnoid; lumbar/sacral  11/8/2013    Procedure: LUMBAR EPIDURAL;  Surgeon: Nima Jameson MD;  Location: [facility]    Injection, w/wo contrast, dx/therapeutic substance, epidural/subarachnoid; lumbar/sacral N/A 11/21/2014    Procedure: LUMBAR EPIDURAL;  Surgeon: Nima Jameson MD;  Location: [facility]    Injection, w/wo contrast, dx/therapeutic substance, epidural/subarachnoid; lumbar/sacral N/A 12/29/2014    Procedure: LUMBAR EPIDURAL;  Surgeon: Nima Jameson MD;  Location: [facility]    Injection, w/wo contrast, dx/therapeutic substance, epidural/subarachnoid; lumbar/sacral N/A 1/26/2015    Procedure: LUMBAR EPIDURAL;  Surgeon: Nima Jameson MD;  Location: [facility]    Injection, w/wo contrast, dx/therapeutic substance, epidural/subarachnoid; lumbar/sacral N/A 9/10/2015    Procedure: CAUDAL;  Surgeon: Nima Jameson MD;  Location: [facility]    Injection, w/wo contrast,  dx/therapeutic substance, epidural/subarachnoid; lumbar/sacral N/A 11/30/2015    Procedure: CAUDAL;  Surgeon: Nima Jameson MD;  Location: [facility]    Laparoscopic cholecystectomy  4/2010    Laparoscopy,diagnostic  2002,2011    M-sedaj by  phys perfrmg svc 5+ yr  3/8/2012    Procedure: COCCYX;  Surgeon: Karlos Barron MD;  Location: [facility]    M-sedaj by  phys perfrmg svc 5+ yr  3/29/2012    Procedure: COCCYX;  Surgeon: Karlos Barron MD;  Location: [facility]    M-sedaj by  phys perfrmg svc 5+ yr  1/25/2013    Procedure: COCCYX;  Surgeon: Terry Torres MD;  Location: [facility]    M-sedaj by  phys perfrmg svc 5+ yr  2/15/2013    Procedure: COCCYX;  Surgeon: Karlos Barron MD;  Location: [facility]    M-sedaj by  phys perfrmg svc 5+ yr  3/1/2013    Procedure: COCCYX;  Surgeon: Karlos Barron MD;  Location: [facility]    M-sedaj by  phys perfrmg svc 5+ yr 9/25/2013    Procedure: COCCYX;  Surgeon: Nima Jameson MD;  Location: [facility]    M-sedaj by  phys perfrmg svc 5+ yr  10/18/2013    Procedure: TRANSFORAMINAL EPIDURAL - LUMBAR;  Surgeon: Nima Jameson MD;  Location: [facility]    M-sedaj by  phys perfrmg svc 5+ yr 11/8/2013    Procedure: LUMBAR EPIDURAL;  Surgeon: Nima Jameson MD;  Location: [facility]    M-sedaj by  phys perfrmg svc 5+ yr N/A 11/21/2014    Procedure: STELLATE GANGLION INJECTION;  Surgeon: Nima Jameson MD;  Location: [facility]    M-sedaj by  phys perfrmg svc 5+ yr N/A 12/12/2014    Procedure: STELLATE GANGLION INJECTION;  Surgeon: Nima Jameson MD;  Location: [facility]    M-sedaj by andrea corey [facility] 5+ yr N/A 12/29/2014    Procedure: LUMBAR EPIDURAL;  Surgeon: Nima Jameson MD;  Location: [facility]     M-sedaj by  phys perfrmg svc 5+ yr N/A 2015    Procedure: GANGLION IMPAR BLOCK;  Surgeon: Nima Jameson MD;  Location: [facility]    M-sedaj by  phys perfrmg svc 5+ yr N/A 3/12/2015    Procedure: GANGLION IMPAR BLOCK;  Surgeon: Nima Jameson MD;  Location: [facility]    M-sedaj by  phys perfrmg svc 5+ yr  3/26/2015    Procedure: GANGLION IMPAR BLOCK;  Surgeon: Nima Jameson MD;  Location: [facility]    M-sedaj by  phys perfrmg svc 5+ yr  2015    Procedure: ;  Surgeon: Nima Jameson MD;  Location: [facility]    M-sedaj by  phys perfrmg svc 5+ yr N/A 9/10/2015    Procedure: CAUDAL;  Surgeon: Nima Jameson MD;  Location: [facility]    M-sedaj by  phys perfrmg svc 5+ yr N/A 2015    Procedure: CAUDAL;  Surgeon: Nima Jameson MD;  Location: [facility]    M-sedaj by  phys perfrmg svc 5+ yr N/A 2015    Procedure: GANGLION IMPAR BLOCK;  Surgeon: Nima Jameson MD;  Location: [facility]    M-sedaj by  phys perfrmg svc 5+ yr  2016    Procedure: ;  Surgeon: Nima Jameson MD;  Location: [facility]    Nervous system surgery unlisted  3/26/2015    Procedure: GANGLION IMPAR BLOCK;  Surgeon: Nima Jameson MD;  Location: [facility]          Other surgical history      RT shoulder    Other surgical history      Wrist     Other surgical history      kidney stones    Repair epigastric hernia,reduc  2002    Thoracotomy,remv pulm foreign body  2003    left lung mass-benign    Tonsillectomy      w/adenoids    Ventral hernia repair  2012     Current Outpatient Medications   Medication Sig Dispense Refill    atorvastatin 20 MG Oral Tab Take 1 tablet (20 mg total) by mouth nightly. 90 tablet 3    HYDROcodone-acetaminophen (NORCO)  MG Oral Tab Take 1 tablet by mouth every 8 (eight) hours as needed  for Pain. 90 tablet 0    lidocaine 5 % External Patch Place 1 patch onto the skin daily. 30 patch 0    ondansetron (ZOFRAN) 4 mg tablet Take 1 tablet (4 mg total) by mouth every 8 (eight) hours as needed for Nausea. 20 tablet 0    methylPREDNISolone (MEDROL) 4 MG Oral Tablet Therapy Pack As directed. 1 each 0    HYDROcodone-acetaminophen (NORCO)  MG Oral Tab Take 1 tablet by mouth every 6 (six) hours as needed for Pain. 30 tablet 0    metoprolol succinate ER 50 MG Oral Tablet 24 Hr TAKE 1 TABLET(50 MG) BY MOUTH DAILY 30 tablet 0    potassium chloride 20 MEQ Oral Tab CR Take 1 tablet (20 mEq total) by mouth daily. 90 tablet 2    valACYclovir 1 G Oral Tab Take 1 tablet (1,000 mg total) by mouth 2 (two) times a day. (Patient taking differently: Take 1 tablet (1,000 mg total) by mouth as needed.) 180 tablet 0    Penciclovir (DENAVIR) 1 % External Cream Apply 1 Application topically every 2 (two) hours as needed. 5 g 0    Calcium Polycarbophil (FIBER) 625 MG Oral Tab Take by mouth.      Naloxone HCl 4 MG/0.1ML Nasal Liquid 4 mg by Nasal route as needed. If patient remains unresponsive, repeat dose in other nostril 2-5 minutes after first dose. (Patient not taking: Reported on 2024) 1 kit 0     Allergies   Allergen Reactions    Motrin [Ibuprofen]      Edema     Other      IVP dye -unknown reation     Family History   Problem Relation Age of Onset    Cancer Father         lung    Diabetes Maternal Grandfather      Social History     Occupational History    Not on file   Tobacco Use    Smoking status: Former     Current packs/day: 0.00     Average packs/day: 1 pack/day for 32.0 years (32.0 ttl pk-yrs)     Types: Cigarettes     Start date: 3/29/1976     Quit date: 3/29/2008     Years since quittin.1    Smokeless tobacco: Never   Vaping Use    Vaping status: Never Used   Substance and Sexual Activity    Alcohol use: No     Alcohol/week: 0.0 standard drinks of alcohol    Drug use: No    Sexual activity: Never       Review of Systems (negative unless bolded):  General: fevers, chills, fatigue  CV:  chest pain, palpitations, leg swelling  Msk: bodyaches, neck pain, neck stiffness  Skin: rashes, open wounds, nonhealing ulcers  Hem: bleeds easily, bruise easily, immunocompromised  Neuro: dizziness, light headedness, headaches  Psych: anxious, depressed, anger issues    Attention: This note has been scribed by Dominga Reed under the supervision of Amilcar Dawn MD.      Amilcar Dawn MD   Hand, Wrist, & Elbow Surgery  [email or web address]  t: [phone]  f: [phone]

## 2024-05-14 ENCOUNTER — TELEPHONE (OUTPATIENT)
Dept: INTERNAL MEDICINE CLINIC | Facility: CLINIC | Age: 64
End: 2024-05-14

## 2024-05-14 ENCOUNTER — TELEPHONE (OUTPATIENT)
Dept: ORTHOPEDICS CLINIC | Facility: CLINIC | Age: 64
End: 2024-05-14

## 2024-05-14 DIAGNOSIS — G89.29 OTHER CHRONIC PAIN: ICD-10-CM

## 2024-05-14 DIAGNOSIS — G56.40 COMPLEX REGIONAL PAIN SYNDROME TYPE 2, AFFECTING UNSPECIFIED SITE: Primary | Chronic | ICD-10-CM

## 2024-05-14 DIAGNOSIS — I10 ESSENTIAL HYPERTENSION: Chronic | ICD-10-CM

## 2024-05-14 RX ORDER — PANTOPRAZOLE SODIUM 40 MG/1
40 TABLET, DELAYED RELEASE ORAL
Qty: 30 TABLET | Refills: 11 | Status: SHIPPED | OUTPATIENT
Start: 2024-05-14

## 2024-05-14 RX ORDER — METOPROLOL SUCCINATE 50 MG/1
50 TABLET, EXTENDED RELEASE ORAL DAILY
Qty: 30 TABLET | Refills: 0 | Status: SHIPPED | OUTPATIENT
Start: 2024-05-14

## 2024-05-14 RX ORDER — LIDOCAINE 50 MG/G
1 PATCH TOPICAL DAILY
Qty: 30 PATCH | Refills: 0 | Status: SHIPPED | OUTPATIENT
Start: 2024-05-14

## 2024-05-14 RX ORDER — HYDROCODONE BITARTRATE AND ACETAMINOPHEN 10; 325 MG/1; MG/1
1 TABLET ORAL EVERY 8 HOURS PRN
Qty: 90 TABLET | Refills: 0 | Status: SHIPPED | OUTPATIENT
Start: 2024-05-14

## 2024-05-14 RX ORDER — ONDANSETRON 4 MG/1
4 TABLET, FILM COATED ORAL EVERY 8 HOURS PRN
Qty: 20 TABLET | Refills: 0 | Status: SHIPPED | OUTPATIENT
Start: 2024-05-14

## 2024-05-14 NOTE — TELEPHONE ENCOUNTER
Patient is calling regarding her unsigned notes from her visit with Dr. Dawn on 5/13. Please advise

## 2024-05-14 NOTE — TELEPHONE ENCOUNTER
S/w patient and informed of recommendations from Dr Alanis. Provided contact info for Dr Thompson. Patient verbalized understanding.

## 2024-05-14 NOTE — TELEPHONE ENCOUNTER
Patient called to request refill:   HYDROcodone-acetaminophen (NORCO)  MG Oral Tab 90 tablet     lidocaine 5 % External Patch 30 patch     ondansetron (ZOFRAN) 4 mg tablet 20 tablet         Call Back: [phone]     Preferred Pharmacy:   [id number] [place], [place] - [address] AT [facility] [address], [phone], [phone]   [address] [facility] [address]   Phone: [phone] Fax: [phone]   Hours: Not open 24 hours

## 2024-05-14 NOTE — TELEPHONE ENCOUNTER
Please let patient know that from here on close advisable for to see a pain service team.  The referral has been placed.  This is in the best interest of the patient and to avoid any interruption in her prescriptions.  It is best from here on that this prescription comes from the pain service

## 2024-06-10 ENCOUNTER — MED REC SCAN ONLY (OUTPATIENT)
Dept: PAIN CLINIC | Facility: CLINIC | Age: 64
End: 2024-06-10

## 2024-06-10 ENCOUNTER — LAB ENCOUNTER (OUTPATIENT)
Dept: LAB | Age: 64
End: 2024-06-10
Attending: ANESTHESIOLOGY
Payer: COMMERCIAL

## 2024-06-10 ENCOUNTER — OFFICE VISIT (OUTPATIENT)
Dept: PAIN CLINIC | Facility: CLINIC | Age: 64
End: 2024-06-10
Payer: COMMERCIAL

## 2024-06-10 VITALS
OXYGEN SATURATION: 97 % | HEART RATE: 85 BPM | DIASTOLIC BLOOD PRESSURE: 84 MMHG | BODY MASS INDEX: 30 KG/M2 | SYSTOLIC BLOOD PRESSURE: 132 MMHG | WEIGHT: 182 LBS

## 2024-06-10 DIAGNOSIS — M53.3 COCCYDYNIA: Chronic | ICD-10-CM

## 2024-06-10 DIAGNOSIS — M54.12 CERVICAL RADICULITIS: ICD-10-CM

## 2024-06-10 DIAGNOSIS — F11.20 CHRONIC NARCOTIC DEPENDENCE (HCC): ICD-10-CM

## 2024-06-10 DIAGNOSIS — G56.03 BILATERAL CARPAL TUNNEL SYNDROME: ICD-10-CM

## 2024-06-10 DIAGNOSIS — Z96.89 SPINAL CORD STIMULATOR STATUS: Primary | Chronic | ICD-10-CM

## 2024-06-10 PROCEDURE — 99205 OFFICE O/P NEW HI 60 MIN: CPT | Performed by: ANESTHESIOLOGY

## 2024-06-10 PROCEDURE — 3079F DIAST BP 80-89 MM HG: CPT | Performed by: ANESTHESIOLOGY

## 2024-06-10 PROCEDURE — 80307 DRUG TEST PRSMV CHEM ANLYZR: CPT | Performed by: ANESTHESIOLOGY

## 2024-06-10 PROCEDURE — 3075F SYST BP GE 130 - 139MM HG: CPT | Performed by: ANESTHESIOLOGY

## 2024-06-10 RX ORDER — HYDROCODONE BITARTRATE AND ACETAMINOPHEN 10; 325 MG/1; MG/1
1 TABLET ORAL EVERY 8 HOURS PRN
Qty: 30 TABLET | Refills: 0 | Status: SHIPPED | OUTPATIENT
Start: 2024-06-14

## 2024-06-10 NOTE — PROGRESS NOTES
Subjective:   Patient ID: Radha Jones is a 63 year old female.    HPI    History/Other:   Review of Systems  Current Outpatient Medications   Medication Sig Dispense Refill   • METOPROLOL SUCCINATE ER 50 MG Oral Tablet 24 Hr TAKE 1 TABLET(50 MG) BY MOUTH DAILY 30 tablet 0   • PANTOPRAZOLE 40 MG Oral Tab EC TAKE 1 TABLET(40 MG) BY MOUTH EVERY MORNING BEFORE BREAKFAST 30 tablet 11   • HYDROcodone-acetaminophen (NORCO)  MG Oral Tab Take 1 tablet by mouth every 8 (eight) hours as needed for Pain. 90 tablet 0   • ondansetron (ZOFRAN) 4 mg tablet Take 1 tablet (4 mg total) by mouth every 8 (eight) hours as needed for Nausea. 20 tablet 0   • lidocaine 5 % External Patch Place 1 patch onto the skin daily. 30 patch 0   • atorvastatin 20 MG Oral Tab Take 1 tablet (20 mg total) by mouth nightly. 90 tablet 3   • methylPREDNISolone (MEDROL) 4 MG Oral Tablet Therapy Pack As directed. 1 each 0   • HYDROcodone-acetaminophen (NORCO)  MG Oral Tab Take 1 tablet by mouth every 6 (six) hours as needed for Pain. 30 tablet 0   • potassium chloride 20 MEQ Oral Tab CR Take 1 tablet (20 mEq total) by mouth daily. 90 tablet 2   • valACYclovir 1 G Oral Tab Take 1 tablet (1,000 mg total) by mouth 2 (two) times a day. (Patient taking differently: Take 1 tablet (1,000 mg total) by mouth as needed.) 180 tablet 0   • Penciclovir (DENAVIR) 1 % External Cream Apply 1 Application topically every 2 (two) hours as needed. 5 g 0   • Calcium Polycarbophil (FIBER) 625 MG Oral Tab Take by mouth.     • Naloxone HCl 4 MG/0.1ML Nasal Liquid 4 mg by Nasal route as needed. If patient remains unresponsive, repeat dose in other nostril 2-5 minutes after first dose. (Patient not taking: Reported on 2/29/2024) 1 kit 0     Allergies:  Allergies   Allergen Reactions   • Motrin [Ibuprofen]      Edema    • Other      IVP dye -unknown reation       Objective:   Physical Exam  Constitutional:          Assessment & Plan:   No diagnosis found.    No orders of  the defined types were placed in this encounter.      Meds This Visit:  Requested Prescriptions      No prescriptions requested or ordered in this encounter       Imaging & Referrals:  None    Location of Pain: center of low back, left side mid back, left ip, cramping in bilateral hands    Date Pain Began: 1990s          Work Related:   No        Receiving Work Comp/Disability:   No    Numeric Rating Scale:  Pain at Present:  6/10                                                                                                            (No Pain) 0  to  10 (Worst Pain)                 Minimum Pain:   2  Maximum Pain  10    Distribution of Pain:    left    Quality of Pain:   numbness, sharp/stabbing, and tingling    Origin of Pain:    Lifting, Repetitive motion, and Surgical complications    Aggravating Factors:    Cold, Sitting, Standing, and Walking    Past Treatments for Current Pain Condition:   Other spinal cord stimulator    Prior diagnostic testing for your pain:  CT cervical 2022

## 2024-06-10 NOTE — PATIENT INSTRUCTIONS
Refill policies:    Allow 2-3 business days for refills; controlled substances may take longer.  Contact your pharmacy at least 5 days prior to running out of medication and have them send an electronic request or submit request through the “request refill” option in your [employer] account.  Refills are not addressed on weekends; covering physicians do not authorize routine medications on weekends.  No narcotics or controlled substances are refilled after noon on Fridays or by on call physicians.  By law, narcotics must be electronically prescribed.  A 30 day supply with no refills is the maximum allowed.  If your prescription is due for a refill, you may be due for a follow up appointment.  To best provide you care, patients receiving routine medications need to be seen at least once a year.  Patients receiving narcotic/controlled substance medications need to be seen at least once every 3 months.  In the event that your preferred pharmacy does not have the requested medication in stock (e.g. Backordered), it is your responsibility to find another pharmacy that has the requested medication available.  We will gladly send a new prescription to that pharmacy at your request.    Scheduling Tests:    If your physician has ordered radiology tests such as MRI or CT scans, please contact Central Scheduling at [phone] right away to schedule the test.  Once scheduled, the [facility] Centralized Referral Team will work with your insurance carrier to obtain pre-certification or prior authorization.  Depending on your insurance carrier, approval may take 3-10 days.  It is highly recommended patients assure they have received an authorization before having a test performed.  If test is done without insurance authorization, patient may be responsible for the entire amount billed.      Precertification and Prior Authorizations:  If your physician has recommended that you have a procedure or additional testing performed the [facility]  Centralized Referral Team will contact your insurance carrier to obtain pre-certification or prior authorization.    You are strongly encouraged to contact your insurance carrier to verify that your procedure/test has been approved and is a COVERED benefit.  Although the [facility] Centralized Referral Team does its due diligence, the insurance carrier gives the disclaimer that \"Although the procedure is authorized, this does not guarantee payment.\"    Ultimately the patient is responsible for payment.   Thank you for your understanding in this matter.  Paperwork Completion:  If you require FMLA or disability paperwork for your recovery, please make sure to either drop it off or have it faxed to our office at [phone]. Be sure the form has your name and date of birth on it.  The form will be faxed to our Forms Department and they will complete it for you.  There is a 25$ fee for all forms that need to be filled out.  Please be aware there is a 10-14 day turnaround time.  You will need to sign a release of information (TRAN) form if your paperwork does not come with one.  You may call the Forms Department with any questions at [phone].  Their fax number is [phone].

## 2024-06-10 NOTE — H&P
Name: Radha Jones   : 1960   DOS: 6/10/2024     Chief complaint: Low back     History of present illness:  Radha Jones is a 63 year old female with a longstanding history of chronic pain here to establish care.  The patient reports a history of previous thoracotomy for removal of a mass.  Since then, has been having pain along the chest wall and was diagnosed with CRPS.  As a result of this, has been prescribed hydrocodone and has been taking hydrocodone for the past 20 years.  This has been managed by her primary care physician who is planning to retire.  Therefore she was referred here to discuss continuation of medication.    Patient also was seen by [facility] in the past for low back pain, lumbar degenerative disc disease, and radicular symptoms.  She had multiple injections and ultimately had a spinal cord stimulator placed in .  This is a [employer] system.  She continues to use the stimulator but states the battery is not holding charge.    Patient also complains of neck pain with bilateral upper extremity radicular symptoms.  Complains of cramping in both hands.    She denies any chills, fever or weakness. She denies any bladder or bowel incontinence.      Past Medical History:    Arthritis    Back pain    Calculus of kidney    Carpal tunnel syndrome    Log Date: 2012     Cervical stenosis of spine    COPD (chronic obstructive pulmonary disease) (HCC)    Degeneration of lumbar or lumbosacral intervertebral disc    DJD (degenerative joint disease) of hip    Bilateral     Esophageal reflux    Essential hypertension    Follicular cyst of ovary    left    Heartburn    High blood pressure    High cholesterol    Hx of motion sickness    Hyperlipidemia    Leg swelling    Migraines    Neuropathy    LUQ    Opiate use    Osteoporosis    Other chronic pain    Presence of other cardiac implants and grafts    Recurrent pneumonia    Renal stones    Rib fracture    Sciatica    Spinal  stenosis, lumbar region, without neurogenic claudication    Trigger finger of right thumb    Trigger finger, left middle finger    Trigger finger, right index finger    Trigger finger, right ring finger    Varicose vein    Ventral hernia    Visual impairment    glasses    Wears glasses      Current Outpatient Medications   Medication Sig Dispense Refill    [START ON 6/14/2024] HYDROcodone-acetaminophen (NORCO)  MG Oral Tab Take 1 tablet by mouth every 8 (eight) hours as needed for Pain. 30 tablet 0    METOPROLOL SUCCINATE ER 50 MG Oral Tablet 24 Hr TAKE 1 TABLET(50 MG) BY MOUTH DAILY 30 tablet 0    PANTOPRAZOLE 40 MG Oral Tab EC TAKE 1 TABLET(40 MG) BY MOUTH EVERY MORNING BEFORE BREAKFAST 30 tablet 11    HYDROcodone-acetaminophen (NORCO)  MG Oral Tab Take 1 tablet by mouth every 8 (eight) hours as needed for Pain. 90 tablet 0    ondansetron (ZOFRAN) 4 mg tablet Take 1 tablet (4 mg total) by mouth every 8 (eight) hours as needed for Nausea. 20 tablet 0    atorvastatin 20 MG Oral Tab Take 1 tablet (20 mg total) by mouth nightly. 90 tablet 3    potassium chloride 20 MEQ Oral Tab CR Take 1 tablet (20 mEq total) by mouth daily. 90 tablet 2    valACYclovir 1 G Oral Tab Take 1 tablet (1,000 mg total) by mouth 2 (two) times a day. (Patient taking differently: Take 1 tablet (1,000 mg total) by mouth as needed.) 180 tablet 0    Penciclovir (DENAVIR) 1 % External Cream Apply 1 Application topically every 2 (two) hours as needed. 5 g 0    Calcium Polycarbophil (FIBER) 625 MG Oral Tab Take by mouth.      lidocaine 5 % External Patch Place 1 patch onto the skin daily. (Patient not taking: Reported on 6/10/2024) 30 patch 0    Naloxone HCl 4 MG/0.1ML Nasal Liquid 4 mg by Nasal route as needed. If patient remains unresponsive, repeat dose in other nostril 2-5 minutes after first dose. (Patient not taking: Reported on 2/29/2024) 1 kit 0     Past Surgical History:   Procedure Laterality Date    Arthrocentesis aspir&/inj  major jt/bursa w/us N/A 3/26/2015    Procedure: HIP INJECTION (PAIN);  Surgeon: Nima Jameson MD;  Location: [facility]    Arthrocentesis aspir&/inj major jt/bursa w/us  7/30/2015    Procedure: ;  Surgeon: Nima Jameson MD;  Location: [facility]    Carpal tunnel release      08/2014    Colonoscopy  2010    Colonoscopy N/A 11/12/2014    Procedure: COLONOSCOPY;  Surgeon: Valentin Ramos MD;  Location:  ENDOSCOPY    D & c      Drain/inject medium joint/bursa  3/8/2012    Procedure: COCCYX;  Surgeon: Karlos Barron MD;  Location: [facility]    Drain/inject medium joint/bursa  3/29/2012    Procedure: COCCYX;  Surgeon: Karlos Barron MD;  Location: [facility]    Drain/inject medium joint/bursa  3/29/2012    Procedure: COCCYX;  Surgeon: Karlos Barron MD;  Location: [facility]    Drain/inject medium joint/bursa  1/25/2013    Procedure: COCCYX;  Surgeon: Terry Torres MD;  Location: [facility]    Drain/inject medium joint/bursa  2/15/2013    Procedure: COCCYX;  Surgeon: Karlos Barron MD;  Location: [facility]    Drain/inject medium joint/bursa  2/15/2013    Procedure: COCCYX;  Surgeon: Karlos Barron MD;  Location: [facility]    Drain/inject medium joint/bursa  3/1/2013    Procedure: COCCYX;  Surgeon: Karlos Barron MD;  Location: [facility]    Drain/inject medium joint/bursa  3/1/2013    Procedure: COCCYX;  Surgeon: Karlos Barron MD;  Location: [facility]    Fluor gid & loclzj ndl/cath spi dx/ther njx  11/8/2013    Procedure: COCCYX;  Surgeon: Nima Jameson MD;  Location: [facility]    Fluor gid & loclzj ndl/cath spi dx/ther njx N/A 11/21/2014    Procedure: STELLATE GANGLION INJECTION;  Surgeon: Nima Jameson MD;  Location: [facility]    Fluor gid & [facility]  ndl/cath spi dx/ther njx N/A 12/29/2014    Procedure: LUMBAR EPIDURAL;  Surgeon: Nima Jameson MD;  Location: [facility]    Fluor gid & loclzj ndl/cath spi dx/ther njx N/A 1/26/2015    Procedure: GANGLION IMPAR BLOCK;  Surgeon: Nima Jameson MD;  Location: [facility]    Fluor gid & loclzj ndl/cath spi dx/ther njx N/A 9/10/2015    Procedure: CAUDAL;  Surgeon: Nima Jameson MD;  Location: [facility]    Fluor gid & loclzj ndl/cath spi dx/ther njx N/A 11/30/2015    Procedure: CAUDAL;  Surgeon: Nima Jameson MD;  Location: [facility]    Fluoroscopic guidance needle placement  3/8/2012    Procedure: COCCYX;  Surgeon: Karlos Barron MD;  Location: [facility]    Fluoroscopic guidance needle placement  3/29/2012    Procedure: COCCYX;  Surgeon: aKrlos Barron MD;  Location: [facility]    Fluoroscopic guidance needle placement  1/25/2013    Procedure: COCCYX;  Surgeon: Terry Torres MD;  Location: [facility]    Fluoroscopic guidance needle placement  2/15/2013    Procedure: COCCYX;  Surgeon: Karlos Barron MD;  Location: [facility]    Fluoroscopic guidance needle placement  10/18/2013    Procedure: COCCYX;  Surgeon: Nima Jameson MD;  Location: [facility]    Fluoroscopic guidance needle placement  11/8/2013    Procedure: COCCYX;  Surgeon: Nima Jameson MD;  Location: [facility]    Hysterectomy      partial, at age 34    Inj tendon/ligament/cyst  9/25/2013    Procedure: COCCYX;  Surgeon: Nima Jameson MD;  Location: [facility]    Inj tendon/ligament/cyst  10/18/2013    Procedure: COCCYX;  Surgeon: Nima Jameson MD;  Location: [facility]    Inj tendon/ligament/cyst  11/8/2013    Procedure: COCCYX;  Surgeon: Nima Jameson MD;  Location: [facility]     Inject nerv blck,paravert sympath N/A 11/21/2014    Procedure: STELLATE GANGLION INJECTION;  Surgeon: Nima Jameson MD;  Location: [facility]    Inject nerv blck,paravert sympath N/A 12/12/2014    Procedure: STELLATE GANGLION INJECTION;  Surgeon: Nima Jameson MD;  Location: [facility]    Inject nerv blck,paravert sympath N/A 12/29/2014    Procedure: LUMBAR SYMPATHETIC INJECTION;  Surgeon: Nima Jameson MD;  Location: [facility]    Inject nerv blck,paravert sympath N/A 1/26/2015    Procedure: GANGLION IMPAR BLOCK;  Surgeon: Nima Jameson MD;  Location: [facility]    Inject nerv blck,paravert sympath N/A 3/12/2015    Procedure: GANGLION IMPAR BLOCK;  Surgeon: Nima Jameson MD;  Location: [facility]    Inject nerv blck,paravert sympath N/A 7/30/2015    Procedure: GANGLION IMPAR BLOCK;  Surgeon: Nima Jameson MD;  Location: [facility]    Inject nerv blck,paravert sympath N/A 12/22/2015    Procedure: GANGLION IMPAR BLOCK;  Surgeon: Nima Jameson MD;  Location: [facility]    Inject nerv blck,paravert sympath  1/11/2016    Procedure: ;  Surgeon: Nima Jameson MD;  Location: [facility]    Injection, anesthetic/steroid, transforaminal epidural; lumbar/sacral, single level  9/25/2013    Procedure: TRANSFORAMINAL EPIDURAL - LUMBAR;  Surgeon: Nima Jameson MD;  Location: [facility]    Injection, anesthetic/steroid, transforaminal epidural; lumbar/sacral, single level  10/18/2013    Procedure: TRANSFORAMINAL EPIDURAL - LUMBAR;  Surgeon: Nima Jameson MD;  Location: [facility]    Injection, w/wo contrast, dx/therapeutic substance, epidural/subarachnoid; lumbar/sacral  11/8/2013    Procedure: LUMBAR EPIDURAL;  Surgeon: Nima Jameson MD;  Location: [facility]    Injection, w/wo contrast,  dx/therapeutic substance, epidural/subarachnoid; lumbar/sacral N/A 11/21/2014    Procedure: LUMBAR EPIDURAL;  Surgeon: Nima Jameson MD;  [place] [facility]    Injection, w/wo contrast, dx/therapeutic substance, epidural/subarachnoid; lumbar/sacral N/A 12/29/2014    Procedure: LUMBAR EPIDURAL;  Surgeon: Nima Jameson MD;  [place] [facility]    Injection, w/wo contrast, dx/therapeutic substance, epidural/subarachnoid; lumbar/sacral N/A 1/26/2015    Procedure: LUMBAR EPIDURAL;  Surgeon: Nima Jameson MD;  [place] [facility]    Injection, w/wo contrast, dx/therapeutic substance, epidural/subarachnoid; lumbar/sacral N/A 9/10/2015    Procedure: CAUDAL;  Surgeon: Nima Jameson MD;  [place] [facility]    Injection, w/wo contrast, dx/therapeutic substance, epidural/subarachnoid; lumbar/sacral N/A 11/30/2015    Procedure: CAUDAL;  Surgeon: Nima Jameson MD;  [place] [facility]    Laparoscopic cholecystectomy  4/2010    Laparoscopy,diagnostic  2002,2011    M-sedaj by  phys perfrmg svc 5+ yr  3/8/2012    Procedure: COCCYX;  Surgeon: Karlos Barron MD;  [place] [facility]    M-sedaj by  phys perfrmg svc 5+ yr  3/29/2012    Procedure: COCCYX;  Surgeon: Karlos Barron MD;  [place] [facility]    M-sedaj by  phys perfrmg svc 5+ yr  1/25/2013    Procedure: COCCYX;  Surgeon: Terry Torres MD;  [place] [facility]    M-sedaj by  phys perfrmg svc 5+ yr  2/15/2013    Procedure: COCCYX;  Surgeon: Karlos Barron MD;  [place] [facility]    M-sedaj by  phys perfrmg svc 5+ yr  3/1/2013    Procedure: COCCYX;  Surgeon: Karlos Barron MD;  [place] [facility]    M-sedaj by  phys perfrmg svc 5+ yr  9/25/2013    Procedure: COCCYX;  Surgeon: Nima Jameson MD;  Location: [facility]     M-sedaj by  phys perfrmg svc 5+ yr  10/18/2013    Procedure: TRANSFORAMINAL EPIDURAL - LUMBAR;  Surgeon: Nima Jameson MD;  Location: [facility] [place] [facility]    M-sedaj by  phys perfrmg svc 5+ yr  11/8/2013    Procedure: LUMBAR EPIDURAL;  Surgeon: Nima Jameson MD;  Location: [facility] [place] [facility]    M-sedaj by  phys perfrmg svc 5+ yr N/A 11/21/2014    Procedure: STELLATE GANGLION INJECTION;  Surgeon: Nima Jameson MD;  Location: [facility]    M-sedaj by  phys perfrmg svc 5+ yr N/A 12/12/2014    Procedure: STELLATE GANGLION INJECTION;  Surgeon: Nima Jameson MD;  Location: [facility] [place] [facility]    M-sedaj by  phys perfrmg svc 5+ yr N/A 12/29/2014    Procedure: LUMBAR EPIDURAL;  Surgeon: Nima Jameson MD;  Location: [facility] [place] [facility]    M-sedaj by  phys perfrmg svc 5+ yr N/A 1/26/2015    Procedure: GANGLION IMPAR BLOCK;  Surgeon: Nima Jameson MD;  Location: [facility] [place] [facility]    M-sedaj by  phys perfrmg svc 5+ yr N/A 3/12/2015    Procedure: GANGLION IMPAR BLOCK;  Surgeon: iNma Jameson MD;  Location: [facility] [place] [facility]    M-sedaj by  phys perfrmg svc 5+ yr  3/26/2015    Procedure: GANGLION IMPAR BLOCK;  Surgeon: Nima Jameson MD;  Location: [facility] [place] [facility]    M-sedaj by  phys perfrmg svc 5+ yr  7/30/2015    Procedure: ;  Surgeon: Nima Jameson MD;  Location: [facility]    M-sedaj by  phys perfrmg svc 5+ yr N/A 9/10/2015    Procedure: CAUDAL;  Surgeon: Nima Jameson MD;  Location: [facility]    M-sedaj by  phys perfrmg svc 5+ yr N/A 11/30/2015    Procedure: CAUDAL;  Surgeon: Nima Jameson MD;  Location: [facility]    M-seda by [facility] 5+ yr N/A 12/22/2015    Procedure: GANGLION IMPAR BLOCK;  Surgeon: Nima Jameson MD;  Location: [facility]    M-seda by [facility] 5+ yr   2016    Procedure: ;  Surgeon: Nima Jameson MD;  Location: [facility]    Nervous system surgery unlisted  3/26/2015    Procedure: GANGLION IMPAR BLOCK;  Surgeon: Nima Jameson MD;  Location: [facility]          Other surgical history      RT shoulder    Other surgical history      Wrist     Other surgical history      kidney stones    Repair epigastric hernia,reduc  2002    Thoracotomy,remv pulm foreign body  2003    left lung mass-benign    Tonsillectomy      w/adenoids    Ventral hernia repair  2012      Family History   Problem Relation Age of Onset    Cancer Father         lung    Diabetes Maternal Grandfather      Social History     Socioeconomic History    Marital status:    Tobacco Use    Smoking status: Former     Current packs/day: 0.00     Average packs/day: 1 pack/day for 32.0 years (32.0 ttl pk-yrs)     Types: Cigarettes     Start date: 3/29/1976     Quit date: 3/29/2008     Years since quittin.2    Smokeless tobacco: Never   Vaping Use    Vaping status: Never Used   Substance and Sexual Activity    Alcohol use: No     Alcohol/week: 0.0 standard drinks of alcohol    Drug use: No    Sexual activity: Never   Other Topics Concern    Caffeine Concern Yes     Comment: 3 cups/cans daily.     Stress Concern No    Weight Concern No    Special Diet No    Exercise No    Seat Belt Yes     Social Determinants of Health      Received from [facility]       Review of  other systems:  10 point ROS otherwise negative    Physical examination: Radha is a 63 year old female not in acute distress  /84 (BP Location: Left arm, Patient Position: Sitting, Cuff Size: large)   Pulse 85   Wt 182 lb (82.6 kg)   SpO2 97%   BMI 30.29 kg/m²    The patient is awake, alert, oriented and corporative. She has a normal affect. The patient ambulates with normal gait.  HEENT: No gross lesion noted. PEERL. No icterus.  Neck and Upper Extremity: Supple.  No thyromegaly or lymphadenopathy. S  Motor Examination:    (R)   (L)  Deltoid:      5    5  Biceps:       5    5  Triceps:      5    5  Wrist Extension:     5    5  Wrist Flexsion:     5    5  Finger Extension:     5    5  Finger Flexsion:     5    5     Cardiovascular system: Regular rate and rhythm.    Respiratory system: Breath sounds equal bilaterally.    Abdomen: Soft, nontender   Neurologic:  Cranial nerves II through XII are grossly intact.       Examination of the lower back:    Motor Examination:   (R)   (L)   Hip Abduction:   5    5   Hip Flexion:    5    5   Knee Extension:   5    5   Knee Flexion:    5    5   Ant. Tibialis:    5    5  Extensor Hallucis Longus:   5    5  Peroneals:     5    5  Gastrocsoleus:     5    5       Radiology diagnostic studies:   Cervical CT from 2022 reviewed with evidence of posterior disc osteophyte complex leading to moderate central canal stenosis at C5-6    Assessment:  Encounter Diagnoses   Name Primary?    Spinal cord stimulator status Yes    Cervical radiculitis     Chronic narcotic dependence (HCC)     Bilateral carpal tunnel syndrome     Coccydynia    .      Plan:     The patient is a 63-year-old female with a longstanding history of chronic pain.  The patient is here to establish [facility].  The patient was diagnosed with CRPS following thoracotomy to remove a thoracic mass.  Has been on chronic pain medications, primarily hydrocodone.  This is managed by Dr. Alanis.  Dr. Alanis may be retiring soon and refer patient here to the establish [facility].  Had a detailed discussion with patient regarding the role for pain management through the pain clinic.  I will have the patient undergo UDS and sign a narcotic contract.  I will send a refill for the patient dated June 14, 2024.  This will hopefully avoid any lapse in medication coverage for the patient.  However, it is not the role for the pain clinic to manage her medications on a chronic basis as she does not meet the pain  clinic prescribing guidelines.  She is well below the 50 morphine daily equivalents needed for pain clinic to take over her medications.  Encouraged the patient to address this issue with her new PCP.  Patient is in agreement and would like to continue her pain medications from her PCP as this was how she was treated for the past 20 years and will save her a co-pay.    From a cervical pain standpoint, will need updated cervical CT scan.  Potential role for cervical epidural steroid injection.    Patient also has a [employer] spinal cord stimulator placed in 2015.  The patient states that while the simulation system is still functional, it is not holding charge.  Likely battery is at end-of-life.  Did refer patient to Dr. Ventura to discuss changing battery system.        Christ Thompson MD MPH  Pain Management

## 2024-06-12 ENCOUNTER — TELEPHONE (OUTPATIENT)
Dept: PAIN CLINIC | Facility: CLINIC | Age: 64
End: 2024-06-12

## 2024-06-12 NOTE — TELEPHONE ENCOUNTER
Contacted pharmacy at this time and confirmed that pt only received 30 tablets. Spoke to pharmacy and states that pt received 90 last May 15 and not due for refill til 6/14. No further questions at this time,

## 2024-06-12 NOTE — TELEPHONE ENCOUNTER
Patient placed a refill for Norco but only received a 15 day supply and patient actually needs the month supply of 90 tablets.

## 2024-06-12 NOTE — TELEPHONE ENCOUNTER
RN  contacted PT at this time and advised that  only prescribed her 30 tablets and wants him to establish care for her new PCP. Per PT, she's not  found a new PCP at this time, and she needs her medication. Per pt, she needs 90 tablets, and she does not want to keep calling every week for her medication. Advised pt that we will reach out to  for feedback.

## 2024-06-13 NOTE — TELEPHONE ENCOUNTER
Christ Thompson MD   to Me       6/13/24  9:28 AM  Will wait until UDS is back prior to prescribing more.    Contacted pt at this time and relayed the message from . Pt vu and no further questions at this time.

## 2024-06-27 ENCOUNTER — HOSPITAL ENCOUNTER (OUTPATIENT)
Dept: CT IMAGING | Age: 64
Discharge: HOME | End: 2024-06-27
Attending: ANESTHESIOLOGY
Payer: COMMERCIAL

## 2024-06-27 DIAGNOSIS — M54.12 CERVICAL RADICULITIS: ICD-10-CM

## 2024-06-27 PROCEDURE — 72125 CT NECK SPINE W/O DYE: CPT | Performed by: ANESTHESIOLOGY

## 2024-07-09 ENCOUNTER — OFFICE VISIT (OUTPATIENT)
Dept: INTERNAL MEDICINE CLINIC | Facility: CLINIC | Age: 64
End: 2024-07-09
Payer: COMMERCIAL

## 2024-07-09 ENCOUNTER — TELEPHONE (OUTPATIENT)
Dept: INTERNAL MEDICINE CLINIC | Facility: CLINIC | Age: 64
End: 2024-07-09

## 2024-07-09 VITALS
HEIGHT: 65 IN | TEMPERATURE: 97 F | HEART RATE: 65 BPM | WEIGHT: 178 LBS | BODY MASS INDEX: 29.66 KG/M2 | DIASTOLIC BLOOD PRESSURE: 100 MMHG | SYSTOLIC BLOOD PRESSURE: 160 MMHG | RESPIRATION RATE: 16 BRPM | OXYGEN SATURATION: 97 %

## 2024-07-09 DIAGNOSIS — Z86.19 H/O COLD SORES: Primary | ICD-10-CM

## 2024-07-09 DIAGNOSIS — Z12.31 ENCOUNTER FOR SCREENING MAMMOGRAM FOR MALIGNANT NEOPLASM OF BREAST: ICD-10-CM

## 2024-07-09 DIAGNOSIS — G89.29 OTHER CHRONIC PAIN: Primary | ICD-10-CM

## 2024-07-09 DIAGNOSIS — Z00.00 LABORATORY EXAMINATION ORDERED AS PART OF A ROUTINE GENERAL MEDICAL EXAMINATION: ICD-10-CM

## 2024-07-09 DIAGNOSIS — R20.2 PARESTHESIA OF BOTH HANDS: ICD-10-CM

## 2024-07-09 PROCEDURE — 3077F SYST BP >= 140 MM HG: CPT | Performed by: INTERNAL MEDICINE

## 2024-07-09 PROCEDURE — 3008F BODY MASS INDEX DOCD: CPT | Performed by: INTERNAL MEDICINE

## 2024-07-09 PROCEDURE — 99213 OFFICE O/P EST LOW 20 MIN: CPT | Performed by: INTERNAL MEDICINE

## 2024-07-09 PROCEDURE — 3080F DIAST BP >= 90 MM HG: CPT | Performed by: INTERNAL MEDICINE

## 2024-07-09 RX ORDER — VALACYCLOVIR HYDROCHLORIDE 500 MG/1
500 TABLET, FILM COATED ORAL 2 TIMES DAILY
Qty: 6 TABLET | Refills: 0 | Status: SHIPPED | OUTPATIENT
Start: 2024-07-09 | End: 2024-07-12

## 2024-07-09 NOTE — PROGRESS NOTES
Radha Jones  1960 is a 63 year old female.    Chief Complaint   Patient presents with    Medication Follow-Up       HPI:   Here because she is frustrated with inability to get her prescriptions.  The patient has been trying to get a lidocaine however there is a prior authorization being required by her insurance company.  She also did see the pain service who pretty much stated that they would not handle this case the daily narcotic should come from the primary care physician.  Current Outpatient Medications   Medication Sig Dispense Refill    valACYclovir 500 MG Oral Tab Take 1 tablet (500 mg total) by mouth in the morning and 1 tablet (500 mg total) before bedtime. Do all this for 3 days. 6 tablet 0    HYDROcodone-acetaminophen (NORCO)  MG Oral Tab Take 1 tablet by mouth every 8 (eight) hours as needed for Pain. 90 tablet 0    HYDROcodone-acetaminophen (NORCO)  MG Oral Tab Take 1 tablet by mouth every 8 (eight) hours as needed for Pain. 30 tablet 0    METOPROLOL SUCCINATE ER 50 MG Oral Tablet 24 Hr TAKE 1 TABLET(50 MG) BY MOUTH DAILY 30 tablet 0    PANTOPRAZOLE 40 MG Oral Tab EC TAKE 1 TABLET(40 MG) BY MOUTH EVERY MORNING BEFORE BREAKFAST 30 tablet 11    ondansetron (ZOFRAN) 4 mg tablet Take 1 tablet (4 mg total) by mouth every 8 (eight) hours as needed for Nausea. 20 tablet 0    atorvastatin 20 MG Oral Tab Take 1 tablet (20 mg total) by mouth nightly. 90 tablet 3    potassium chloride 20 MEQ Oral Tab CR Take 1 tablet (20 mEq total) by mouth daily. 90 tablet 2    Penciclovir (DENAVIR) 1 % External Cream Apply 1 Application topically every 2 (two) hours as needed. 5 g 0    Calcium Polycarbophil (FIBER) 625 MG Oral Tab Take by mouth.      Naloxone HCl 4 MG/0.1ML Nasal Liquid 4 mg by Nasal route as needed. If patient remains unresponsive, repeat dose in other nostril 2-5 minutes after first dose. 1 kit 0    lidocaine 5 % External Patch Place 1 patch onto the skin daily. (Patient not taking:  Reported on 6/10/2024) 30 patch 0      Past Medical History:    Arthritis    Back pain    Calculus of kidney    Carpal tunnel syndrome    Log Date: 2012     Cervical stenosis of spine    COPD (chronic obstructive pulmonary disease) (HCC)    Degeneration of lumbar or lumbosacral intervertebral disc    DJD (degenerative joint disease) of hip    Bilateral     Esophageal reflux    Essential hypertension    Follicular cyst of ovary    left    Heartburn    High blood pressure    High cholesterol    Hx of motion sickness    Hyperlipidemia    Leg swelling    Migraines    Neuropathy    LUQ    Opiate use    Osteoporosis    Other chronic pain    Presence of other cardiac implants and grafts    Recurrent pneumonia    Renal stones    Rib fracture    Sciatica    Spinal stenosis, lumbar region, without neurogenic claudication    Trigger finger of right thumb    Trigger finger, left middle finger    Trigger finger, right index finger    Trigger finger, right ring finger    Varicose vein    Ventral hernia    Visual impairment    glasses    Wears glasses      Social History:  Social History     Socioeconomic History    Marital status:    Tobacco Use    Smoking status: Former     Current packs/day: 0.00     Average packs/day: 1 pack/day for 32.0 years (32.0 ttl pk-yrs)     Types: Cigarettes     Start date: 3/29/1976     Quit date: 3/29/2008     Years since quittin.2    Smokeless tobacco: Never   Vaping Use    Vaping status: Never Used   Substance and Sexual Activity    Alcohol use: No     Alcohol/week: 0.0 standard drinks of alcohol    Drug use: No    Sexual activity: Never   Other Topics Concern    Caffeine Concern Yes     Comment: 3 cups/cans daily.     Stress Concern No    Weight Concern No    Special Diet No    Exercise No    Seat Belt Yes     Social Determinants of Health      Received from [facility]        REVIEW OF SYSTEMS:   na        EXAM:   BP (!) 160/100   Pulse 65   Temp  97.1 °F (36.2 °C) (Temporal)   Resp 16   Ht 5' 5\" (1.651 m)   Wt 178 lb (80.7 kg)   SpO2 97%   BMI 29.62 kg/m²     na      ASSESSMENT AND PLAN:   Radha was seen today for medication follow-up.    Diagnoses and all orders for this visit:    H/O cold sores  -     valACYclovir 500 MG Oral Tab; Take 1 tablet (500 mg total) by mouth in the morning and 1 tablet (500 mg total) before bedtime. Do all this for 3 days.    Laboratory examination ordered as part of a routine general medical examination  -     Assay, Thyroid Stim Hormone; Future  -     Hemoglobin A1C; Future  -     Lipid Panel; Future  -     Comp Metabolic Panel (14); Future  -     CBC With Differential With Platelet; Future  -     Urinalysis, Routine; Future    Encounter for screening mammogram for malignant neoplasm of breast  -     [facility] NANY 2D+3D SCREENING BILAT (CPT=77067/30767); Future    CT scan C-spine discussed with the patient C-spine which was ordered by Dr. Langford.  He wanted to see Dr. Ventura.    Patient Instructions   This was essentially an administrative encounter.  Patient advised to get a complete physical.   The patient indicates understanding of these issues and agrees to the plan.  The patient is asked to No follow-ups on file.  .      Julian Alanis MD

## 2024-07-12 NOTE — TELEPHONE ENCOUNTER
Pended LIT, please review and sign if agree. Thanks!    S/w pt.   She stated based off her symptoms she would like an LIT added to her lab work. She stated her daughter is having similar sympotms and her doctor ordered an LIT.  Pt states in the past she's had one positive Lupus test and one negative.

## 2024-07-25 DIAGNOSIS — G56.03 BILATERAL CARPAL TUNNEL SYNDROME: ICD-10-CM

## 2024-07-25 RX ORDER — HYDROCODONE BITARTRATE AND ACETAMINOPHEN 10; 325 MG/1; MG/1
1 TABLET ORAL EVERY 8 HOURS PRN
Qty: 30 TABLET | Refills: 0 | OUTPATIENT
Start: 2024-07-25

## 2024-07-25 NOTE — TELEPHONE ENCOUNTER
HYDROcodone-acetaminophen (NORCO)  MG     [id number] - [place], [place] - [address] AT [facility] [address], [phone], [phone]

## 2024-08-13 ENCOUNTER — LAB ENCOUNTER (OUTPATIENT)
Dept: LAB | Age: 64
End: 2024-08-13
Attending: INTERNAL MEDICINE
Payer: COMMERCIAL

## 2024-08-13 DIAGNOSIS — G89.29 OTHER CHRONIC PAIN: ICD-10-CM

## 2024-08-13 DIAGNOSIS — R20.2 PARESTHESIA OF BOTH HANDS: ICD-10-CM

## 2024-08-13 DIAGNOSIS — Z00.00 LABORATORY EXAMINATION ORDERED AS PART OF A ROUTINE GENERAL MEDICAL EXAMINATION: ICD-10-CM

## 2024-08-13 LAB
ALBUMIN SERPL-MCNC: 4.6 G/DL (ref 3.2–4.8)
ALBUMIN/GLOB SERPL: 1.6 {RATIO} (ref 1–2)
ALP LIVER SERPL-CCNC: 98 U/L
ALT SERPL-CCNC: 20 U/L
ANION GAP SERPL CALC-SCNC: 4 MMOL/L (ref 0–18)
AST SERPL-CCNC: 21 U/L (ref ?–34)
BASOPHILS # BLD AUTO: 0.04 X10(3) UL (ref 0–0.2)
BASOPHILS NFR BLD AUTO: 0.4 %
BILIRUB SERPL-MCNC: 0.5 MG/DL (ref 0.2–1.1)
BILIRUB UR QL STRIP.AUTO: NEGATIVE
BUN BLD-MCNC: 18 MG/DL (ref 9–23)
CALCIUM BLD-MCNC: 9.7 MG/DL (ref 8.7–10.4)
CHLORIDE SERPL-SCNC: 109 MMOL/L (ref 98–112)
CHOLEST SERPL-MCNC: 203 MG/DL (ref ?–200)
CLARITY UR REFRACT.AUTO: CLEAR
CO2 SERPL-SCNC: 26 MMOL/L (ref 21–32)
COLOR UR AUTO: YELLOW
CREAT BLD-MCNC: 0.89 MG/DL
EGFRCR SERPLBLD CKD-EPI 2021: 72 ML/MIN/1.73M2 (ref 60–?)
EOSINOPHIL # BLD AUTO: 0.21 X10(3) UL (ref 0–0.7)
EOSINOPHIL NFR BLD AUTO: 2.1 %
ERYTHROCYTE [DISTWIDTH] IN BLOOD BY AUTOMATED COUNT: 12.5 %
EST. AVERAGE GLUCOSE BLD GHB EST-MCNC: 128 MG/DL (ref 68–126)
FASTING PATIENT LIPID ANSWER: YES
FASTING STATUS PATIENT QL REPORTED: YES
GLOBULIN PLAS-MCNC: 2.8 G/DL (ref 2–3.5)
GLUCOSE BLD-MCNC: 116 MG/DL (ref 70–99)
GLUCOSE UR STRIP.AUTO-MCNC: NORMAL MG/DL
HBA1C MFR BLD: 6.1 % (ref ?–5.7)
HCT VFR BLD AUTO: 43.8 %
HDLC SERPL-MCNC: 43 MG/DL (ref 40–59)
HGB BLD-MCNC: 14.3 G/DL
IMM GRANULOCYTES # BLD AUTO: 0.02 X10(3) UL (ref 0–1)
IMM GRANULOCYTES NFR BLD: 0.2 %
KETONES UR STRIP.AUTO-MCNC: NEGATIVE MG/DL
LDLC SERPL CALC-MCNC: 127 MG/DL (ref ?–100)
LEUKOCYTE ESTERASE UR QL STRIP.AUTO: 25
LYMPHOCYTES # BLD AUTO: 2.59 X10(3) UL (ref 1–4)
LYMPHOCYTES NFR BLD AUTO: 25.9 %
MCH RBC QN AUTO: 31 PG (ref 26–34)
MCHC RBC AUTO-ENTMCNC: 32.6 G/DL (ref 31–37)
MCV RBC AUTO: 95 FL
MONOCYTES # BLD AUTO: 0.65 X10(3) UL (ref 0.1–1)
MONOCYTES NFR BLD AUTO: 6.5 %
NEUTROPHILS # BLD AUTO: 6.48 X10 (3) UL (ref 1.5–7.7)
NEUTROPHILS # BLD AUTO: 6.48 X10(3) UL (ref 1.5–7.7)
NEUTROPHILS NFR BLD AUTO: 64.9 %
NITRITE UR QL STRIP.AUTO: NEGATIVE
NONHDLC SERPL-MCNC: 160 MG/DL (ref ?–130)
OSMOLALITY SERPL CALC.SUM OF ELEC: 291 MOSM/KG (ref 275–295)
PH UR STRIP.AUTO: 5.5 [PH] (ref 5–8)
PLATELET # BLD AUTO: 356 10(3)UL (ref 150–450)
POTASSIUM SERPL-SCNC: 4.4 MMOL/L (ref 3.5–5.1)
PROT SERPL-MCNC: 7.4 G/DL (ref 5.7–8.2)
RBC # BLD AUTO: 4.61 X10(6)UL
SODIUM SERPL-SCNC: 139 MMOL/L (ref 136–145)
SP GR UR STRIP.AUTO: 1.02 (ref 1–1.03)
TRIGL SERPL-MCNC: 187 MG/DL (ref 30–149)
TSI SER-ACNC: 1.26 MIU/ML (ref 0.55–4.78)
UROBILINOGEN UR STRIP.AUTO-MCNC: NORMAL MG/DL
VLDLC SERPL CALC-MCNC: 34 MG/DL (ref 0–30)
WBC # BLD AUTO: 10 X10(3) UL (ref 4–11)

## 2024-08-13 PROCEDURE — 81001 URINALYSIS AUTO W/SCOPE: CPT

## 2024-08-13 PROCEDURE — 83036 HEMOGLOBIN GLYCOSYLATED A1C: CPT

## 2024-08-13 PROCEDURE — 80061 LIPID PANEL: CPT

## 2024-08-13 PROCEDURE — 86038 ANTINUCLEAR ANTIBODIES: CPT

## 2024-08-13 PROCEDURE — 80053 COMPREHEN METABOLIC PANEL: CPT

## 2024-08-13 PROCEDURE — 86225 DNA ANTIBODY NATIVE: CPT

## 2024-08-13 PROCEDURE — 84443 ASSAY THYROID STIM HORMONE: CPT

## 2024-08-13 PROCEDURE — 36415 COLL VENOUS BLD VENIPUNCTURE: CPT

## 2024-08-13 PROCEDURE — 85025 COMPLETE CBC W/AUTO DIFF WBC: CPT

## 2024-08-14 LAB
DSDNA IGG SERPL IA-ACNC: 9.7 IU/ML
ENA AB SER QL IA: 0.3 UG/L
ENA AB SER QL IA: NEGATIVE

## 2024-08-19 DIAGNOSIS — G56.03 BILATERAL CARPAL TUNNEL SYNDROME: ICD-10-CM

## 2024-08-19 RX ORDER — HYDROCODONE BITARTRATE AND ACETAMINOPHEN 10; 325 MG/1; MG/1
1 TABLET ORAL EVERY 8 HOURS PRN
Qty: 90 TABLET | Refills: 0 | Status: SHIPPED | OUTPATIENT
Start: 2024-08-19

## 2024-10-24 ENCOUNTER — HOSPITAL ENCOUNTER (OUTPATIENT)
Dept: MAMMOGRAPHY | Age: 64
Discharge: HOME | End: 2024-10-24
Attending: INTERNAL MEDICINE
Payer: COMMERCIAL

## 2024-10-24 DIAGNOSIS — Z12.31 ENCOUNTER FOR SCREENING MAMMOGRAM FOR MALIGNANT NEOPLASM OF BREAST: ICD-10-CM

## 2024-10-24 PROCEDURE — 77063 BREAST TOMOSYNTHESIS BI: CPT | Performed by: INTERNAL MEDICINE

## 2024-10-24 PROCEDURE — 77067 SCR MAMMO BI INCL CAD: CPT | Performed by: INTERNAL MEDICINE

## 2024-12-23 DIAGNOSIS — G56.40 COMPLEX REGIONAL PAIN SYNDROME TYPE 2, AFFECTING UNSPECIFIED SITE: Chronic | ICD-10-CM

## 2024-12-24 RX ORDER — LIDOCAINE 50 MG/G
1 PATCH TOPICAL DAILY
Qty: 30 PATCH | Refills: 0 | Status: SHIPPED | OUTPATIENT
Start: 2024-12-24

## 2025-03-19 NOTE — TELEPHONE ENCOUNTER
Instructions:    Take steroids as prescribed, take with food.  Do not take non-steroidal medication when taking this.  May use Tylenol 1000 mg every 8 hours as needed. Do not exceed 3000 mg/day.    2.  Continue to use heat to your back, do this several times per day for about 20 minutes each session.    3.  Avoid activities that aggravate your back    4.  Follow up with PT    5.  If you have worsening of pain, loss of bowel or bladder, fever, chills, loss of sensation in your legs, go to ER.     6. Can cancel appointment in 2 weeks if you have improvement in your symptoms.      7.  XR will take about 1 week to result.  Will call with this.     8. Can try topical patches as directed.  TENS unit can help as well.  Can buy this over the counter.    Medication is not part of protocol list. Please approve if appropriate. Last OV 6/21/17. Last refill 8/29/17 #90.

## 2025-04-16 NOTE — TELEPHONE ENCOUNTER
Protocol passed   LOV: 7/9/24  RTC: none   Filled: 5/4/24 #30   No future appointments.     
3
The patient has received written discharge instructions for Warfarin/Coumadin, including the Warfarin/Coumadin discharge booklet, which contains all of the information listed below. Warfarin/Coumadin is used to prevent new blood clots and keep existing ones from getting bigger. Never skip a dose of Warfarin/Coumadin. If you forget to take your Warfarin/Coumadin, DO NOT take an extra pill to 'catch up'. NEVER TAKE A DOUBLE DOSE. Notify your doctor that you missed a dose. Take Warfarin/Coumadin in the evening at the same time. Warfarin/Coumadin may be taken with other medications or food.

## 2025-06-14 ENCOUNTER — APPOINTMENT (OUTPATIENT)
Dept: CT IMAGING | Facility: HOSPITAL | Age: 65
End: 2025-06-14
Attending: EMERGENCY MEDICINE
Payer: COMMERCIAL

## 2025-06-14 ENCOUNTER — HOSPITAL ENCOUNTER (INPATIENT)
Facility: HOSPITAL | Age: 65
LOS: 1 days | Discharge: HOME | End: 2025-06-15
Attending: EMERGENCY MEDICINE | Admitting: INTERNAL MEDICINE
Payer: COMMERCIAL

## 2025-06-14 ENCOUNTER — APPOINTMENT (OUTPATIENT)
Dept: GENERAL RADIOLOGY | Facility: HOSPITAL | Age: 65
End: 2025-06-14
Attending: UROLOGY
Payer: COMMERCIAL

## 2025-06-14 ENCOUNTER — HOSPITAL ENCOUNTER (OUTPATIENT)
Age: 65
Discharge: ANOTHER HOSPITAL | End: 2025-06-14
Attending: EMERGENCY MEDICINE
Payer: COMMERCIAL

## 2025-06-14 VITALS
TEMPERATURE: 98 F | DIASTOLIC BLOOD PRESSURE: 63 MMHG | HEART RATE: 60 BPM | RESPIRATION RATE: 40 BRPM | SYSTOLIC BLOOD PRESSURE: 146 MMHG | OXYGEN SATURATION: 100 %

## 2025-06-14 DIAGNOSIS — N13.30 HYDRONEPHROSIS, LEFT: ICD-10-CM

## 2025-06-14 DIAGNOSIS — R10.9 LEFT FLANK PAIN: Primary | ICD-10-CM

## 2025-06-14 DIAGNOSIS — R10.9 FLANK PAIN: Primary | ICD-10-CM

## 2025-06-14 LAB
ALBUMIN SERPL-MCNC: 4.7 G/DL (ref 3.2–4.8)
ALBUMIN/GLOB SERPL: 1.5 {RATIO} (ref 1–2)
ALP LIVER SERPL-CCNC: 128 U/L (ref 50–130)
ALT SERPL-CCNC: 28 U/L (ref 10–49)
ANION GAP SERPL CALC-SCNC: 11 MMOL/L (ref 0–18)
AST SERPL-CCNC: 26 U/L (ref ?–34)
BASOPHILS # BLD AUTO: 0.03 X10(3) UL (ref 0–0.2)
BASOPHILS NFR BLD AUTO: 0.2 %
BILIRUB SERPL-MCNC: 0.6 MG/DL (ref 0.2–1.1)
BILIRUB UR QL STRIP.AUTO: NEGATIVE
BILIRUB UR QL STRIP: NEGATIVE
BUN BLD-MCNC: 15 MG/DL (ref 9–23)
CALCIUM BLD-MCNC: 9.9 MG/DL (ref 8.7–10.6)
CHLORIDE SERPL-SCNC: 105 MMOL/L (ref 98–112)
CLARITY UR REFRACT.AUTO: CLEAR
CLARITY UR: CLEAR
CO2 SERPL-SCNC: 26 MMOL/L (ref 21–32)
COLOR UR: YELLOW
CREAT BLD-MCNC: 1.18 MG/DL (ref 0.55–1.02)
EGFRCR SERPLBLD CKD-EPI 2021: 52 ML/MIN/1.73M2 (ref 60–?)
EOSINOPHIL # BLD AUTO: 0.02 X10(3) UL (ref 0–0.7)
EOSINOPHIL NFR BLD AUTO: 0.1 %
ERYTHROCYTE [DISTWIDTH] IN BLOOD BY AUTOMATED COUNT: 12.6 %
GLOBULIN PLAS-MCNC: 3.1 G/DL (ref 2–3.5)
GLUCOSE BLD-MCNC: 153 MG/DL (ref 70–99)
GLUCOSE UR STRIP-MCNC: NEGATIVE MG/DL
GLUCOSE UR STRIP.AUTO-MCNC: NORMAL MG/DL
HCT VFR BLD AUTO: 40 % (ref 35–48)
HGB BLD-MCNC: 13.6 G/DL (ref 12–16)
IMM GRANULOCYTES # BLD AUTO: 0.07 X10(3) UL (ref 0–1)
IMM GRANULOCYTES NFR BLD: 0.4 %
KETONES UR STRIP-MCNC: NEGATIVE MG/DL
KETONES UR STRIP.AUTO-MCNC: NEGATIVE MG/DL
LEUKOCYTE ESTERASE UR QL STRIP.AUTO: NEGATIVE
LYMPHOCYTES # BLD AUTO: 1.96 X10(3) UL (ref 1–4)
LYMPHOCYTES NFR BLD AUTO: 11.5 %
MCH RBC QN AUTO: 31.1 PG (ref 26–34)
MCHC RBC AUTO-ENTMCNC: 34 G/DL (ref 31–37)
MCV RBC AUTO: 91.3 FL (ref 80–100)
MONOCYTES # BLD AUTO: 1.12 X10(3) UL (ref 0.1–1)
MONOCYTES NFR BLD AUTO: 6.5 %
NEUTROPHILS # BLD AUTO: 13.91 X10 (3) UL (ref 1.5–7.7)
NEUTROPHILS # BLD AUTO: 13.91 X10(3) UL (ref 1.5–7.7)
NEUTROPHILS NFR BLD AUTO: 81.3 %
NITRITE UR QL STRIP.AUTO: NEGATIVE
NITRITE UR QL STRIP: NEGATIVE
OSMOLALITY SERPL CALC.SUM OF ELEC: 298 MOSM/KG (ref 275–295)
PH UR STRIP.AUTO: 6 [PH] (ref 5–8)
PH UR STRIP: 5.5 [PH]
PLATELET # BLD AUTO: 359 10(3)UL (ref 150–450)
POTASSIUM SERPL-SCNC: 4.4 MMOL/L (ref 3.5–5.1)
PROT SERPL-MCNC: 7.8 G/DL (ref 5.7–8.2)
PROT UR STRIP-MCNC: NEGATIVE MG/DL
PROT UR STRIP.AUTO-MCNC: NEGATIVE MG/DL
RBC # BLD AUTO: 4.38 X10(6)UL (ref 3.8–5.3)
RBC #/AREA URNS AUTO: >10 /HPF
SODIUM SERPL-SCNC: 142 MMOL/L (ref 136–145)
SP GR UR STRIP.AUTO: 1.01 (ref 1–1.03)
SP GR UR STRIP: >=1.03
UROBILINOGEN UR STRIP-ACNC: <2 MG/DL
UROBILINOGEN UR STRIP.AUTO-MCNC: NORMAL MG/DL
WBC # BLD AUTO: 17.1 X10(3) UL (ref 4–11)

## 2025-06-14 PROCEDURE — 99223 1ST HOSP IP/OBS HIGH 75: CPT | Performed by: INTERNAL MEDICINE

## 2025-06-14 PROCEDURE — 99213 OFFICE O/P EST LOW 20 MIN: CPT

## 2025-06-14 PROCEDURE — 74176 CT ABD & PELVIS W/O CONTRAST: CPT | Performed by: EMERGENCY MEDICINE

## 2025-06-14 PROCEDURE — 74018 RADEX ABDOMEN 1 VIEW: CPT | Performed by: UROLOGY

## 2025-06-14 PROCEDURE — 81002 URINALYSIS NONAUTO W/O SCOPE: CPT

## 2025-06-14 PROCEDURE — S0119 ONDANSETRON 4 MG: HCPCS

## 2025-06-14 RX ORDER — HYDROCODONE BITARTRATE AND ACETAMINOPHEN 10; 325 MG/1; MG/1
1 TABLET ORAL EVERY 8 HOURS PRN
Status: DISCONTINUED | OUTPATIENT
Start: 2025-06-14 | End: 2025-06-15

## 2025-06-14 RX ORDER — CEFDINIR 300 MG/1
300 CAPSULE ORAL 2 TIMES DAILY
Qty: 14 CAPSULE | Refills: 0 | Status: SHIPPED | OUTPATIENT
Start: 2025-06-14 | End: 2025-06-15

## 2025-06-14 RX ORDER — HYDROMORPHONE HYDROCHLORIDE 1 MG/ML
0.5 INJECTION, SOLUTION INTRAMUSCULAR; INTRAVENOUS; SUBCUTANEOUS ONCE
Refills: 0 | Status: COMPLETED | OUTPATIENT
Start: 2025-06-14 | End: 2025-06-14

## 2025-06-14 RX ORDER — HYDROMORPHONE HYDROCHLORIDE 1 MG/ML
0.8 INJECTION, SOLUTION INTRAMUSCULAR; INTRAVENOUS; SUBCUTANEOUS EVERY 2 HOUR PRN
Status: DISCONTINUED | OUTPATIENT
Start: 2025-06-14 | End: 2025-06-15

## 2025-06-14 RX ORDER — BISACODYL 10 MG
10 SUPPOSITORY, RECTAL RECTAL
Status: DISCONTINUED | OUTPATIENT
Start: 2025-06-14 | End: 2025-06-15

## 2025-06-14 RX ORDER — PANTOPRAZOLE SODIUM 40 MG/1
40 TABLET, DELAYED RELEASE ORAL
Status: DISCONTINUED | OUTPATIENT
Start: 2025-06-14 | End: 2025-06-15

## 2025-06-14 RX ORDER — BENZONATATE 100 MG/1
200 CAPSULE ORAL 3 TIMES DAILY PRN
Status: DISCONTINUED | OUTPATIENT
Start: 2025-06-14 | End: 2025-06-15

## 2025-06-14 RX ORDER — PROCHLORPERAZINE EDISYLATE 5 MG/ML
5 INJECTION INTRAMUSCULAR; INTRAVENOUS EVERY 8 HOURS PRN
Status: DISCONTINUED | OUTPATIENT
Start: 2025-06-14 | End: 2025-06-15

## 2025-06-14 RX ORDER — ECHINACEA PURPUREA EXTRACT 125 MG
1 TABLET ORAL
Status: DISCONTINUED | OUTPATIENT
Start: 2025-06-14 | End: 2025-06-15

## 2025-06-14 RX ORDER — SENNOSIDES 8.6 MG
17.2 TABLET ORAL NIGHTLY PRN
Status: DISCONTINUED | OUTPATIENT
Start: 2025-06-14 | End: 2025-06-15

## 2025-06-14 RX ORDER — ONDANSETRON 2 MG/ML
4 INJECTION INTRAMUSCULAR; INTRAVENOUS EVERY 6 HOURS PRN
Status: DISCONTINUED | OUTPATIENT
Start: 2025-06-14 | End: 2025-06-15

## 2025-06-14 RX ORDER — HYDROMORPHONE HYDROCHLORIDE 1 MG/ML
1 INJECTION, SOLUTION INTRAMUSCULAR; INTRAVENOUS; SUBCUTANEOUS ONCE
Refills: 0 | Status: COMPLETED | OUTPATIENT
Start: 2025-06-14 | End: 2025-06-14

## 2025-06-14 RX ORDER — ENOXAPARIN SODIUM 100 MG/ML
40 INJECTION SUBCUTANEOUS NIGHTLY
Status: DISCONTINUED | OUTPATIENT
Start: 2025-06-14 | End: 2025-06-15

## 2025-06-14 RX ORDER — ONDANSETRON 2 MG/ML
4 INJECTION INTRAMUSCULAR; INTRAVENOUS ONCE
Status: COMPLETED | OUTPATIENT
Start: 2025-06-14 | End: 2025-06-14

## 2025-06-14 RX ORDER — ACETAMINOPHEN 500 MG
1000 TABLET ORAL EVERY 8 HOURS PRN
Status: DISCONTINUED | OUTPATIENT
Start: 2025-06-14 | End: 2025-06-15

## 2025-06-14 RX ORDER — HYDROMORPHONE HYDROCHLORIDE 1 MG/ML
0.4 INJECTION, SOLUTION INTRAMUSCULAR; INTRAVENOUS; SUBCUTANEOUS EVERY 2 HOUR PRN
Status: DISCONTINUED | OUTPATIENT
Start: 2025-06-14 | End: 2025-06-15

## 2025-06-14 RX ORDER — POLYETHYLENE GLYCOL 3350 17 G/17G
17 POWDER, FOR SOLUTION ORAL DAILY PRN
Status: DISCONTINUED | OUTPATIENT
Start: 2025-06-14 | End: 2025-06-15

## 2025-06-14 RX ORDER — HYDROMORPHONE HYDROCHLORIDE 1 MG/ML
0.2 INJECTION, SOLUTION INTRAMUSCULAR; INTRAVENOUS; SUBCUTANEOUS EVERY 2 HOUR PRN
Status: DISCONTINUED | OUTPATIENT
Start: 2025-06-14 | End: 2025-06-15

## 2025-06-14 RX ORDER — SODIUM PHOSPHATE, DIBASIC AND SODIUM PHOSPHATE, MONOBASIC 7; 19 G/230ML; G/230ML
1 ENEMA RECTAL ONCE AS NEEDED
Status: DISCONTINUED | OUTPATIENT
Start: 2025-06-14 | End: 2025-06-15

## 2025-06-14 RX ORDER — SODIUM CHLORIDE 9 MG/ML
INJECTION, SOLUTION INTRAVENOUS CONTINUOUS
Status: DISCONTINUED | OUTPATIENT
Start: 2025-06-14 | End: 2025-06-15

## 2025-06-14 RX ORDER — ATORVASTATIN CALCIUM 20 MG/1
20 TABLET, FILM COATED ORAL NIGHTLY
Status: DISCONTINUED | OUTPATIENT
Start: 2025-06-14 | End: 2025-06-15

## 2025-06-14 RX ORDER — ONDANSETRON 4 MG/1
4 TABLET, ORALLY DISINTEGRATING ORAL ONCE
Status: COMPLETED | OUTPATIENT
Start: 2025-06-14 | End: 2025-06-14

## 2025-06-14 RX ORDER — DIPHENHYDRAMINE HYDROCHLORIDE 50 MG/ML
25 INJECTION, SOLUTION INTRAMUSCULAR; INTRAVENOUS ONCE
Status: COMPLETED | OUTPATIENT
Start: 2025-06-14 | End: 2025-06-14

## 2025-06-14 RX ORDER — METOPROLOL SUCCINATE 50 MG/1
50 TABLET, EXTENDED RELEASE ORAL
Status: DISCONTINUED | OUTPATIENT
Start: 2025-06-15 | End: 2025-06-15

## 2025-06-14 NOTE — H&P
[facility]     Radha Jones Patient Status:  Emergency    1960 MRN [id number]   Location [facility] EMERGENCY DEPARTMENT Attending Emiliana Padron MD   Hosp Day # 0 PCP Jad Summers     Chief Complaint: Flank Pain     Subjective:    History of Present Illness:     Radha Jones is a 64 year old female with past medical history of chronic back pain, nephrolithiasis presented to the emergency department with left flank pain.    Patient endorses severe onset of left flank/abdominal pain radiating to her left groin earlier today.  She initially went to immediate care.  She took her usual 10 mg of Norco which she has for her chronic back pain and it made no difference.  She sat on the toilet for a while and was unable to urinate.  Otherwise endorses no urinary issues prior to this.  Normal state of health.  No fever or chills.    History/Other:    Past Medical History:  Past Medical History:    Arthritis    Back pain    Calculus of kidney    Carpal tunnel syndrome    Log Date: 2012     Cervical stenosis of spine    COPD (chronic obstructive pulmonary disease) (HCC)    Degeneration of lumbar or lumbosacral intervertebral disc    DJD (degenerative joint disease) of hip    Bilateral     Esophageal reflux    Essential hypertension    Follicular cyst of ovary    left    Heartburn    High blood pressure    High cholesterol    Hx of motion sickness    Hyperlipidemia    Leg swelling    Migraines    Neuropathy    LUQ    Opiate use    Osteoporosis    Other chronic pain    Presence of other cardiac implants and grafts    Recurrent pneumonia    Renal stones    Rib fracture    Sciatica    Spinal stenosis, lumbar region, without neurogenic claudication    Trigger finger of right thumb    Trigger finger, left middle finger    Trigger finger, right index finger    Trigger finger, right ring finger    Varicose vein    Ventral hernia    Visual impairment    glasses    Wears glasses      Past Surgical History:   Past Surgical History:   Procedure Laterality Date    Arthrocentesis aspir&/inj major jt/bursa w/us N/A 3/26/2015    Procedure: HIP INJECTION (PAIN);  Surgeon: Nima Jameson MD;  Location: [facility]    Arthrocentesis aspir&/inj major jt/bursa w/us  7/30/2015    Procedure: ;  Surgeon: Nima Jameson MD;  Location: [facility]    Carpal tunnel release      08/2014    Colonoscopy  2010    Colonoscopy N/A 11/12/2014    Procedure: COLONOSCOPY;  Surgeon: Valentin Ramos MD;  Location:  ENDOSCOPY    D & c      Drain/inject medium joint/bursa  3/8/2012    Procedure: COCCYX;  Surgeon: Karlos Barron MD;  Location: [facility]    Drain/inject medium joint/bursa  3/29/2012    Procedure: COCCYX;  Surgeon: Karlos Barron MD;  Location: [facility]    Drain/inject medium joint/bursa  3/29/2012    Procedure: COCCYX;  Surgeon: Karlos Barron MD;  Location: [facility]    Drain/inject medium joint/bursa  1/25/2013    Procedure: COCCYX;  Surgeon: Terry Torres MD;  Location: [facility]    Drain/inject medium joint/bursa  2/15/2013    Procedure: COCCYX;  Surgeon: Karlos Barron MD;  Location: [facility]    Drain/inject medium joint/bursa  2/15/2013    Procedure: COCCYX;  Surgeon: Karlos Barron MD;  Location: [facility]    Drain/inject medium joint/bursa  3/1/2013    Procedure: COCCYX;  Surgeon: Karlos Barron MD;  Location: [facility]    Drain/inject medium joint/bursa  3/1/2013    Procedure: COCCYX;  Surgeon: Karlos Barron MD;  Location: [facility]    Fluor gid & loclzj ndl/cath spi dx/ther njx  11/8/2013    Procedure: COCCYX;  Surgeon: Nima Jameson MD;  Location: [facility]    Fluor gid & loclzj ndl/cath spi dx/ther njx N/A 11/21/2014    Procedure: STELLATE GANGLION  INJECTION;  Surgeon: Nima Jameson MD;  Location: [facility]    Fluor gid & loclzj ndl/cath spi dx/ther njx N/A 12/29/2014    Procedure: LUMBAR EPIDURAL;  Surgeon: Nima Jameson MD;  Location: [facility] [place] [facility]    Fluor gid & loclzj ndl/cath spi dx/ther njx N/A 1/26/2015    Procedure: GANGLION IMPAR BLOCK;  Surgeon: Nima Jameson MD;  Location: [facility] [place] [facility]    Fluor gid & loclzj ndl/cath spi dx/ther njx N/A 9/10/2015    Procedure: CAUDAL;  Surgeon: Nima Jameson MD;  Location: [facility] [place] [facility]    Fluor gid & loclzj ndl/cath spi dx/ther njx N/A 11/30/2015    Procedure: CAUDAL;  Surgeon: Nima Jameson MD;  Location: [facility]    Fluoroscopic guidance needle placement  3/8/2012    Procedure: COCCYX;  Surgeon: Karlos Barron MD;  Location: [facility]    Fluoroscopic guidance needle placement  3/29/2012    Procedure: COCCYX;  Surgeon: Karlos Barron MD;  Location: [facility]    Fluoroscopic guidance needle placement  1/25/2013    Procedure: COCCYX;  Surgeon: Terry Torres MD;  Location: [facility]    Fluoroscopic guidance needle placement  2/15/2013    Procedure: COCCYX;  Surgeon: Karlos Barron MD;  Location: [facility]    Fluoroscopic guidance needle placement  10/18/2013    Procedure: COCCYX;  Surgeon: Nima Jameson MD;  Location: [facility]    Fluoroscopic guidance needle placement  11/8/2013    Procedure: COCCYX;  Surgeon: Nima Jameson MD;  Location: [facility]    Hysterectomy      partial, at age 34    Inj tendon/ligament/cyst  9/25/2013    Procedure: COCCYX;  Surgeon: Nima Jameson MD;  Location: [facility]    Inj tendon/ligament/cyst  10/18/2013    Procedure: COCCYX;  Surgeon: Nima Jameson MD;  Location: [facility]    Inj tendon/ligament/cyst   11/8/2013    Procedure: COCCYX;  Surgeon: Nima Jameson MD;  Location: [facility]    Inject nerv blck,paravert sympath N/A 11/21/2014    Procedure: STELLATE GANGLION INJECTION;  Surgeon: Nima Jamseon MD;  Location: [facility]    Inject nerv blck,paravert sympath N/A 12/12/2014    Procedure: STELLATE GANGLION INJECTION;  Surgeon: Nima Jameson MD;  Location: [facility]    Inject nerv blck,paravert sympath N/A 12/29/2014    Procedure: LUMBAR SYMPATHETIC INJECTION;  Surgeon: Nima Jameson MD;  Location: [facility]    Inject nerv blck,paravert sympath N/A 1/26/2015    Procedure: GANGLION IMPAR BLOCK;  Surgeon: Nima Jameson MD;  Location: [facility]    Inject nerv blck,paravert sympath N/A 3/12/2015    Procedure: GANGLION IMPAR BLOCK;  Surgeon: Nima Jameson MD;  Location: [facility]    Inject nerv blck,paravert sympath N/A 7/30/2015    Procedure: GANGLION IMPAR BLOCK;  Surgeon: Nima Jameson MD;  Location: [facility]    Inject nerv blck,paravert sympath N/A 12/22/2015    Procedure: GANGLION IMPAR BLOCK;  Surgeon: Nima Jameson MD;  Location: [facility]    Inject nerv blck,paravert sympath  1/11/2016    Procedure: ;  Surgeon: Nima Jameson MD;  Location: [facility]    Injection, anesthetic/steroid, transforaminal epidural; lumbar/sacral, single level  9/25/2013    Procedure: TRANSFORAMINAL EPIDURAL - LUMBAR;  Surgeon: Nima Jameson MD;  Location: [facility]    Injection, anesthetic/steroid, transforaminal epidural; lumbar/sacral, single level  10/18/2013    Procedure: TRANSFORAMINAL EPIDURAL - LUMBAR;  Surgeon: Nima Jameson MD;  Location: [facility]    Injection, w/wo contrast, dx/therapeutic substance, epidural/subarachnoid; lumbar/sacral  11/8/2013    Procedure: LUMBAR EPIDURAL;   Surgeon: Nima Jameson MD;  Location: [facility]    Injection, w/wo contrast, dx/therapeutic substance, epidural/subarachnoid; lumbar/sacral N/A 11/21/2014    Procedure: LUMBAR EPIDURAL;  Surgeon: Nima Jameson MD;  [facility]: [facility]    Injection, w/wo contrast, dx/therapeutic substance, epidural/subarachnoid; lumbar/sacral N/A 12/29/2014    Procedure: LUMBAR EPIDURAL;  Surgeon: Nima Jameson MD;  [facility]: [facility]    Injection, w/wo contrast, dx/therapeutic substance, epidural/subarachnoid; lumbar/sacral N/A 1/26/2015    Procedure: LUMBAR EPIDURAL;  Surgeon: Nima Jameson MD;  [facility]: [facility]    Injection, w/wo contrast, dx/therapeutic substance, epidural/subarachnoid; lumbar/sacral N/A 9/10/2015    Procedure: CAUDAL;  Surgeon: Nima Jameson MD;  Location: [facility]    Injection, w/wo contrast, dx/therapeutic substance, epidural/subarachnoid; lumbar/sacral N/A 11/30/2015    Procedure: CAUDAL;  Surgeon: Nima Jameson MD;  Location: [facility]    Laparoscopic cholecystectomy  4/2010    Laparoscopy,diagnostic  2002,2011    M-sedaj by  phys perfrmg svc 5+ yr  3/8/2012    Procedure: COCCYX;  Surgeon: Karlos Barron MD;  Location: [facility]    M-sedaj by  phys perfrmg svc 5+ yr  3/29/2012    Procedure: COCCYX;  Surgeon: Karlos Barron MD;  Location: [facility]    M-sedaj by  phys perfrmg svc 5+ yr  1/25/2013    Procedure: COCCYX;  Surgeon: Terry Torres MD;  Location: [facility]    M-sedaj by  phys perfrmg svc 5+ yr  2/15/2013    Procedure: COCCYX;  Surgeon: Karlos Barron MD;  Location: [facility]    M-sedaj by  phys perfrmg svc 5+ yr  3/1/2013    Procedure: COCCYX;  Surgeon: Karlos Barron MD;  Location: [facility]    M-sedaj by andrea phys perfrmg svc 5+ yr   9/25/2013    Procedure: COCCYX;  Surgeon: Nima Jameson MD;  Location: [facility]    M-sedaj by  phys perfrmg svc 5+ yr  10/18/2013    Procedure: TRANSFORAMINAL EPIDURAL - LUMBAR;  Surgeon: Nima Jameson MD;  Location: [facility]    M-sedaj by  phys perfrmg svc 5+ yr  11/8/2013    Procedure: LUMBAR EPIDURAL;  Surgeon: Nima Jameson MD;  Location: [facility]    M-sedaj by  phys perfrmg svc 5+ yr N/A 11/21/2014    Procedure: STELLATE GANGLION INJECTION;  Surgeon: Nima Jameson MD;  Location: [facility]    M-sedaj by  phys perfrmg svc 5+ yr N/A 12/12/2014    Procedure: STELLATE GANGLION INJECTION;  Surgeon: Nima Jameson MD;  Location: [facility]    M-sedaj by  phys perfrmg svc 5+ yr N/A 12/29/2014    Procedure: LUMBAR EPIDURAL;  Surgeon: Nima Jameson MD;  Location: [facility]    M-sedaj by  phys perfrmg svc 5+ yr N/A 1/26/2015    Procedure: GANGLION IMPAR BLOCK;  Surgeon: Nima Jameson MD;  Location: [facility]    M-sedaj by  phys perfrmg svc 5+ yr N/A 3/12/2015    Procedure: GANGLION IMPAR BLOCK;  Surgeon: Nima Jameson MD;  Location: [facility]    M-sedaj by  phys perfrmg svc 5+ yr  3/26/2015    Procedure: GANGLION IMPAR BLOCK;  Surgeon: Nima Jameson MD;  Location: [facility]    M-sedaj by  phys perfrmg svc 5+ yr  7/30/2015    Procedure: ;  Surgeon: Nima Jameson MD;  Location: [facility]    M-sedaj by  phys perfrmg svc 5+ yr N/A 9/10/2015    Procedure: CAUDAL;  Surgeon: Nima Jameson MD;  Location: [facility]    M-sedaj by  phys perfrmg [facility] 5+ yr N/A 11/30/2015    Procedure: CAUDAL;  Surgeon: Nima Jameson MD;  Location: [facility]    M-sedaj by  phys perfrmg [facility] 5+ yr N/A 12/22/2015    Procedure: GANGLION IMPAR BLOCK;  Surgeon:  Nima Jameson MD;  Location: [facility]    M-sedaj by andrea metcalf perfrefugiog svc 5+ yr  2016    Procedure: ;  Surgeon: Nima Jameson MD;  Location: [facility]    Nervous system surgery unlisted  3/26/2015    Procedure: GANGLION IMPAR BLOCK;  Surgeon: Nima Jameson MD;  Location: [facility]          Other surgical history      RT shoulder    Other surgical history      Wrist     Other surgical history      kidney stones    Repair epigastric hernia,reduc  2002    Thoracotomy,remv pulm foreign body      left lung mass-benign    Tonsillectomy      w/adenoids    Ventral hernia repair        Family History:   Family History   Problem Relation Age of Onset    Cancer Father         lung    Diabetes Maternal Grandfather      Social History:    reports that she quit smoking about 17 years ago. Her smoking use included cigarettes. She started smoking about 49 years ago. She has a 32 pack-year smoking history. She has never used smokeless tobacco. She reports that she does not drink alcohol and does not use drugs.     Allergies:   Allergies   Allergen Reactions    Motrin [Ibuprofen]      Edema     Other      IVP dye -unknown reation       Medications:    Current Facility-Administered Medications on File Prior to Encounter   Medication Dose Route Frequency Provider Last Rate Last Admin    [COMPLETED] ondansetron (Zofran-ODT) disintegrating tab 4 mg  4 mg Oral Once OhFlorian MD   4 mg at 25 1121     Current Outpatient Medications on File Prior to Encounter   Medication Sig Dispense Refill    HYDROcodone-acetaminophen (NORCO)  MG Oral Tab Take 1 tablet by mouth every 8 (eight) hours as needed for Pain. 90 tablet 0    LIDOCAINE 5 % External Patch APPLY 1 PATCH TOPICALLY TO THE SKIN DAILY FOR 12 HOURS ON AND 12 HOURS OFF 30 patch 0    METOPROLOL SUCCINATE ER 50 MG Oral Tablet 24 Hr TAKE 1 TABLET(50 MG) BY MOUTH DAILY 30 tablet 0    PANTOPRAZOLE  40 MG Oral Tab EC TAKE 1 TABLET(40 MG) BY MOUTH EVERY MORNING BEFORE BREAKFAST 30 tablet 11    ondansetron (ZOFRAN) 4 mg tablet Take 1 tablet (4 mg total) by mouth every 8 (eight) hours as needed for Nausea. 20 tablet 0    atorvastatin 20 MG Oral Tab Take 1 tablet (20 mg total) by mouth nightly. 90 tablet 3    potassium chloride 20 MEQ Oral Tab CR Take 1 tablet (20 mEq total) by mouth daily. 90 tablet 2    Penciclovir (DENAVIR) 1 % External Cream Apply 1 Application topically every 2 (two) hours as needed. 5 g 0    Calcium Polycarbophil (FIBER) 625 MG Oral Tab Take by mouth.      Naloxone HCl 4 MG/0.1ML Nasal Liquid 4 mg by Nasal route as needed. If patient remains unresponsive, repeat dose in other nostril 2-5 minutes after first dose. 1 kit 0       Review of Systems:   A comprehensive review of systems was completed.    Pertinent positives and negatives noted in the HPI.    Objective:   Physical Exam:    /61   Pulse 56   Temp 97.9 °F (36.6 °C) (Temporal)   Resp 16   SpO2 95%   General: No acute distress, Alert  Respiratory: No rhonchi, no wheezes  Cardiovascular: S1, S2. Regular rate and rhythm  Abdomen: Soft, Non-tender, non-distended, positive bowel sounds  Neuro: No new focal deficits  Extremities: No edema      Results:    Labs:      Labs Last 24 Hours:    Recent Labs   Lab 06/14/25  1203   RBC 4.38   HGB 13.6   HCT 40.0   MCV 91.3   MCH 31.1   MCHC 34.0   RDW 12.6   NEPRELIM 13.91*   WBC 17.1*   .0       Recent Labs   Lab 06/14/25  1203   *   BUN 15   CREATSERUM 1.18*   EGFRCR 52*   CA 9.9   ALB 4.7      K 4.4      CO2 26.0   ALKPHO 128   AST 26   ALT 28   BILT 0.6   TP 7.8       Estimated Glomerular Filtration Rate: 52 mL/min/1.73m2 (A) (result from lab).    Lab Results   Component Value Date    INR 0.97 03/08/2018       No results for input(s): \"TROP\", \"TROPHS\", \"CK\" in the last 168 hours.    No results for input(s): \"TROP\", \"PBNP\" in the last 168 hours.    No results for  input(s): \"PCT\" in the last 168 hours.    Imaging: Imaging data reviewed in Epic.    Assessment & Plan:      #Mild left hydronephrosis with left perinephric stranding   -no stone visualized, passed?  -Urine without evidence of infection  -Urology consult  -IVF, pain control    #Leukocytosis/SIRS - no sepsis   -likely stress/reactive, monitor for any infectious sx and trend     #HTN  -Metoprolol         Plan of care discussed with patient, ED MD Arthur Diaz, DO    Supplementary Documentation:     The 21st Century Cures Act makes medical notes like these available to patients in the interest of transparency. Please be advised this is a medical document. Medical documents are intended to carry relevant information, facts as evident, and the clinical opinion of the practitioner. The medical note is intended as peer to peer communication and may appear blunt or direct. It is written in medical language and may contain abbreviations or verbiage that are unfamiliar.

## 2025-06-14 NOTE — ED QUICK NOTES
Patient states that she changed her mind and would like to stay over night for pain control. MD notified.

## 2025-06-14 NOTE — ED PROVIDER NOTES
Patient Seen in: [facility]        History  Chief Complaint   Patient presents with    Abdomen/Flank Pain     Stated Complaint: Kidney stone    Subjective:   HPI            64-year-old female with a previous history of kidney stones presents to the immediate care with complaints of intractable left flank pain associated nausea and vomiting.  Denies any fever.  Patient states the symptoms started several hours ago.  Patient took 10 mg of Norco at home without relief.  She now presents here to the immediate care.  Patient states that she has allergies to NSAIDs.  She denies having any fever or chills.  Denies any dysuria hematuria or frequency.      Objective:     No pertinent past medical history.            No pertinent past surgical history.              No pertinent social history.            Review of Systems    Positive for stated complaint: Kidney stone  Other systems are as noted in HPI.  Constitutional and vital signs reviewed.      All other systems reviewed and negative except as noted above.                  Physical Exam    ED Triage Vitals [06/14/25 1108]   /63   Pulse 60   Resp (!) 40   Temp 97.7 °F (36.5 °C)   Temp src Oral   SpO2 100 %   O2 Device None (Room air)       Current Vitals:   Vital Signs  BP: 146/63  Pulse: 60  Resp: (!) 40  Temp: 97.7 °F (36.5 °C)  Temp src: Oral    Oxygen Therapy  SpO2: 100 %  O2 Device: None (Room air)            Physical Exam  General: Alert and oriented.  Patient appears to be in moderate to severe distress from pain  HEENT: Normocephalic. No evidence of trauma. Extraocular movements are intact.  Cardiovascular exam: Regular rate and rhythm  Lungs: Clear to auscultation bilaterally.  Abdomen: Soft, nondistended, nontender.  Extremities: No evidence of deformity. No clubbing or cyanosis.  Neuro: No focal deficit is noted.        ED Course  Labs Reviewed   [facility] POCT URINALYSIS DIPSTICK - Abnormal; Notable for the following components:       Result  Value    Blood, Urine Small (*)     Leukocyte esterase urine Trace (*)     All other components within normal limits        Patient was administered oral Zofran ODT.  Discussed with the patient that if she has an acute allergic reaction to NSAIDs and she states that Toradol has not helped her in the past, I discussed with her that we do not have any stronger pain medications other than Norco which she is already taken.  I have stated to her that I can continue to work her up.  But she would like something stronger for pain control.  It was at this point it was recommended that patient then proceed to the emergency department to be evaluated and treated for her pain as well as being worked up for her flank pain.  Patient's daughter states that she will take her by private car.                  MDM  64-year-old female presents to the immediate care with complaints of acute onset left flank pain associated nausea vomiting.  Patient's pain is not tolerated at this time.  She will require a period of observation and better pain control.  She will be referred to the emergency department.        Medical Decision Making      Disposition and Plan     Clinical Impression:  1. Flank pain         Disposition:  Ic to ed  6/14/2025 11:22 am    Follow-up:  Julian Alanis MD  [address] [place] [id number] [phone]                Medications Prescribed:  Current Discharge Medication List                Supplementary Documentation:

## 2025-06-14 NOTE — PROGRESS NOTES
NURSING ADMISSION NOTE      Patient admitted via Cart  Oriented to room.  Safety precautions initiated.  Bed in low position.  Call light in reach.    Two person skin check completed with GUILLERMO Paz. All skin C/D/I with no breakdown noted besides previous surgical scar on abdomen (per patient, from past gallbladder surgery). Patient updated on POC.

## 2025-06-14 NOTE — ED INITIAL ASSESSMENT (HPI)
Pt c/o left sided flank pain that radiates to the abdomen and the need to urinate frequently since 0530. Pt states went to UC but sent here for pain control d/t toradol allergy.

## 2025-06-14 NOTE — ED QUICK NOTES
Assumed care. Rounding Completed    Plan of Care reviewed. Fluids infusing. Patient requesting pain medication.     Bed is locked and in lowest position. Call light within reach.

## 2025-06-14 NOTE — ED PROVIDER NOTES
Patient Seen in: [facility]        History  Chief Complaint   Patient presents with    Flank Pain     Stated Complaint: Possible kidney stone -    Subjective:   HPI            64-year-old female with a past medical history as below including kidney stones and CRPS status post spinal cord stimulator presents from immediate care due to left flank pain radiating to left lower quadrant that started last night.  She reports nausea with 2-3 episodes of vomiting.  Patient reports some increased urinary frequency and feels like she needs to have a bowel movement.  Denies dysuria or hematuria.  Denies fever or chills.      Objective:     Past Medical History:    Arthritis    Back pain    Calculus of kidney    Carpal tunnel syndrome    Log Date: 11/28/2012     Cervical stenosis of spine    COPD (chronic obstructive pulmonary disease) (HCC)    Degeneration of lumbar or lumbosacral intervertebral disc    DJD (degenerative joint disease) of hip    Bilateral     Esophageal reflux    Essential hypertension    Follicular cyst of ovary    left    Heartburn    High blood pressure    High cholesterol    Hx of motion sickness    Hyperlipidemia    Leg swelling    Migraines    Neuropathy    LUQ    Opiate use    Osteoporosis    Other chronic pain    Presence of other cardiac implants and grafts    Recurrent pneumonia    Renal stones    Rib fracture    Sciatica    Spinal stenosis, lumbar region, without neurogenic claudication    Trigger finger of right thumb    Trigger finger, left middle finger    Trigger finger, right index finger    Trigger finger, right ring finger    Varicose vein    Ventral hernia    Visual impairment    glasses    Wears glasses              Past Surgical History:   Procedure Laterality Date    Arthrocentesis aspir&/inj major jt/bursa w/us N/A 3/26/2015    Procedure: HIP INJECTION (PAIN);  Surgeon: Nima Jameson MD;  [facility]: [facility]    Arthrocentesis aspir&/inj  major jt/bursa w/us  7/30/2015    Procedure: ;  Surgeon: Nima Jameson MD;  [place] [facility]    Carpal tunnel release      08/2014    Colonoscopy  2010    Colonoscopy N/A 11/12/2014    Procedure: COLONOSCOPY;  Surgeon: Valentin Ramos MD;  Location:  ENDOSCOPY    D & c      Drain/inject medium joint/bursa  3/8/2012    Procedure: COCCYX;  Surgeon: Karlos Barron MD;  [place] [facility]    Drain/inject medium joint/bursa  3/29/2012    Procedure: COCCYX;  Surgeon: Karlos Barron MD;  Location: [facility]    Drain/inject medium joint/bursa  3/29/2012    Procedure: COCCYX;  Surgeon: Karlos Barron MD;  Location: [facility]    Drain/inject medium joint/bursa  1/25/2013    Procedure: COCCYX;  Surgeon: Teryr Torres MD;  Location: [facility]    Drain/inject medium joint/bursa  2/15/2013    Procedure: COCCYX;  Surgeon: Karlos Barron MD;  Location: [facility]    Drain/inject medium joint/bursa  2/15/2013    Procedure: COCCYX;  Surgeon: Karlos Barron MD;  Location: [facility]    Drain/inject medium joint/bursa  3/1/2013    Procedure: COCCYX;  Surgeon: Karlos Barron MD;  Location: [facility]    Drain/inject medium joint/bursa  3/1/2013    Procedure: COCCYX;  Surgeon: Karlos Barron MD;  Location: [facility]    Fluor gid & loclzj ndl/cath spi dx/ther njx  11/8/2013    Procedure: COCCYX;  Surgeon: Nima Jameson MD;  [place] [facility]    Fluor gid & loclzj ndl/cath spi dx/ther njx N/A 11/21/2014    Procedure: STELLATE GANGLION INJECTION;  Surgeon: Nima Jameson MD;  Location: [facility]    Fluor gid & loclzj ndl/cath spi dx/ther njx N/A 12/29/2014    Procedure: LUMBAR EPIDURAL;  Surgeon: Nima Jameson MD;  [place] [facility]    Fluor gid & loclzj ndl/cath spi  dx/ther njx N/A 1/26/2015    Procedure: GANGLION IMPAR BLOCK;  Surgeon: Nima Jameson MD;  [place] [facility]    Fluor gid & loclzj ndl/cath spi dx/ther njx N/A 9/10/2015    Procedure: CAUDAL;  Surgeon: Nima Jameson MD;  [place] [facility]    Fluor gid & loclzj ndl/cath spi dx/ther njx N/A 11/30/2015    Procedure: CAUDAL;  Surgeon: Nima Jameson MD;  [place] [facility]    Fluoroscopic guidance needle placement  3/8/2012    Procedure: COCCYX;  Surgeon: Karlos Barron MD;  Location: [facility]    Fluoroscopic guidance needle placement  3/29/2012    Procedure: COCCYX;  Surgeon: Karlos Barron MD;  Location: [facility]    Fluoroscopic guidance needle placement  1/25/2013    Procedure: COCCYX;  Surgeon: Terry Torres MD;  Location: [facility]    Fluoroscopic guidance needle placement  2/15/2013    Procedure: COCCYX;  Surgeon: Karlos Barron MD;  Location: [facility]    Fluoroscopic guidance needle placement  10/18/2013    Procedure: COCCYX;  Surgeon: Nima Jameson MD;  Location: [facility]    Fluoroscopic guidance needle placement  11/8/2013    Procedure: COCCYX;  Surgeon: Nima Jameson MD;  [place] [facility]    Hysterectomy      partial, at age 34    Inj tendon/ligament/cyst  9/25/2013    Procedure: COCCYX;  Surgeon: Nima Jameson MD;  [place] [facility]    Inj tendon/ligament/cyst  10/18/2013    Procedure: COCCYX;  Surgeon: Nima Jameson MD;  [place] [facility]    Inj tendon/ligament/cyst  11/8/2013    Procedure: COCCYX;  Surgeon: Nima Jameson MD;  Location: [facility]    Inject nerv blck,paravert sympath N/A 11/21/2014    Procedure: STELLATE GANGLION INJECTION;  Surgeon: Nima Jameson MD;  Location: [facility]    Inject  nerv blck,paravert sympath N/A 12/12/2014    Procedure: STELLATE GANGLION INJECTION;  Surgeon: Nima Jameson MD;  [place] [facility]    Inject nerv blck,paravert sympath N/A 12/29/2014    Procedure: LUMBAR SYMPATHETIC INJECTION;  Surgeon: Nima Jameson MD;  [place] [facility]    Inject nerv blck,paravert sympath N/A 1/26/2015    Procedure: GANGLION IMPAR BLOCK;  Surgeon: Nima Jameson MD;  [facility]    Inject nerv blck,paravert sympath N/A 3/12/2015    Procedure: GANGLION IMPAR BLOCK;  Surgeon: Nima Jameson MD;  [place] [facility]    Inject nerv blck,paravert sympath N/A 7/30/2015    Procedure: GANGLION IMPAR BLOCK;  Surgeon: Nima Jameson MD;  [place] [facility]    Inject nerv blck,paravert sympath N/A 12/22/2015    Procedure: GANGLION IMPAR BLOCK;  Surgeon: Nima Jameson MD;  [place] [facility]    Inject nerv blck,paravert sympath  1/11/2016    Procedure: ;  Surgeon: Nima Jameson MD;  Location: [facility]    Injection, anesthetic/steroid, transforaminal epidural; lumbar/sacral, single level  9/25/2013    Procedure: TRANSFORAMINAL EPIDURAL - LUMBAR;  Surgeon: Nima Jameson MD;  Location: [facility]    Injection, anesthetic/steroid, transforaminal epidural; lumbar/sacral, single level  10/18/2013    Procedure: TRANSFORAMINAL EPIDURAL - LUMBAR;  Surgeon: Nima Jameson MD;  Location: [facility]    Injection, w/wo contrast, dx/therapeutic substance, epidural/subarachnoid; lumbar/sacral  11/8/2013    Procedure: LUMBAR EPIDURAL;  Surgeon: Nima Jameson MD;  Location: [facility]    Injection, w/wo contrast, dx/therapeutic substance, epidural/subarachnoid; lumbar/sacral N/A 11/21/2014    Procedure: LUMBAR EPIDURAL;  Surgeon: Nima Jameson MD;  Location: [facility]    Injection, w/wo contrast, dx/therapeutic substance, epidural/subarachnoid; lumbar/sacral N/A 12/29/2014    Procedure: LUMBAR EPIDURAL;  Surgeon: Nima Jameson MD;  Location: [facility]    Injection, w/wo contrast, dx/therapeutic substance, epidural/subarachnoid; lumbar/sacral N/A 1/26/2015    Procedure: LUMBAR EPIDURAL;  Surgeon: Nima Jameson MD;  Location: [facility]    Injection, w/wo contrast, dx/therapeutic substance, epidural/subarachnoid; lumbar/sacral N/A 9/10/2015    Procedure: CAUDAL;  Surgeon: Nima Jameson MD;  Location: [facility]    Injection, w/wo contrast, dx/therapeutic substance, epidural/subarachnoid; lumbar/sacral N/A 11/30/2015    Procedure: CAUDAL;  Surgeon: Nima Jameson MD;  Location: [facility]    Laparoscopic cholecystectomy  4/2010    Laparoscopy,diagnostic  2002,2011    M-sedaj by  phys perfrmg svc 5+ yr  3/8/2012    Procedure: COCCYX;  Surgeon: Karlos Barron MD;  Location: [facility]    M-sedaj by  phys perfrmg svc 5+ yr  3/29/2012    Procedure: COCCYX;  Surgeon: Karlos Barron MD;  Location: [facility]    M-sedaj by  phys perfrmg svc 5+ yr  1/25/2013    Procedure: COCCYX;  Surgeon: Terry Torres MD;  Location: [facility]    M-sedaj by  phys perfrmg svc 5+ yr  2/15/2013    Procedure: COCCYX;  Surgeon: Karlos Barron MD;  Location: [facility]    M-sedaj by  phys perfrmg svc 5+ yr  3/1/2013    Procedure: COCCYX;  Surgeon: Karlos Barron MD;  Location: [facility]    M-sedaj by  phys perfrmg svc 5+ yr  9/25/2013    Procedure: COCCYX;  Surgeon: Nima Jameson MD;  Location: [facility]    M-sedaj by  phys perfrmg svc 5+ yr  10/18/2013    Procedure: TRANSFORAMINAL EPIDURAL - LUMBAR;  Surgeon: Nima Jameson MD;  Location: [facility]     M-sedaj by  phys perfrmg svc 5+ yr  11/8/2013    Procedure: LUMBAR EPIDURAL;  Surgeon: Nima Jameson MD;  [place] [facility]    M-sedaj by  phys perfrmg svc 5+ yr N/A 11/21/2014    Procedure: STELLATE GANGLION INJECTION;  Surgeon: Nima Jameson MD;  [place] [facility]    M-sedaj by  phys perfrmg svc 5+ yr N/A 12/12/2014    Procedure: STELLATE GANGLION INJECTION;  Surgeon: Nima Jameson MD;  [place] [facility]    M-sedaj by  phys perfrmg svc 5+ yr N/A 12/29/2014    Procedure: LUMBAR EPIDURAL;  Surgeon: Nima Jameson MD;  [place] [facility]    M-sedaj by  phys perfrmg svc 5+ yr N/A 1/26/2015    Procedure: GANGLION IMPAR BLOCK;  Surgeon: Nima Jameson MD;  [place]: [facility]    M-sedaj by  phys perfrmg svc 5+ yr N/A 3/12/2015    Procedure: GANGLION IMPAR BLOCK;  Surgeon: Nima Jameson MD;  [facility] [facility]    M-sedaj by  phys perfrmg svc 5+ yr  3/26/2015    Procedure: GANGLION IMPAR BLOCK;  Surgeon: Nima Jameson MD;  [facility] [facility]    M-sedaj by  phys perfrmg svc 5+ yr  7/30/2015    Procedure: ;  Surgeon: Nima Jameson MD;  [facility]: [facility]    M-sedaj by  phys perfrmg svc 5+ yr N/A 9/10/2015    Procedure: CAUDAL;  Surgeon: Nima Jameson MD;  [facility]: [facility]    M-sedaj by  phys perfrmg svc 5+ yr N/A 11/30/2015    Procedure: CAUDAL;  Surgeon: Nima Jameson MD;  [facility]    M-sedaj by  phys perfrmg svc 5+ yr N/A 12/22/2015    Procedure: GANGLION IMPAR BLOCK;  Surgeon: Nima Jameson MD;  Location: [facility]    [facility]  1/11/2016    Procedure: ;  Surgeon: Nima Jameson MD;  Location: [facility]    Nervous system surgery unlisted  3/26/2015    Procedure: GANGLION IMPAR  BLOCK;  Surgeon: Nima Jameson MD;  Location: [facility]          Other surgical history      RT shoulder    Other surgical history      Wrist     Other surgical history      kidney stones    Repair epigastric hernia,reduc  2002    Thoracotomy,remv pulm foreign body  2003    left lung mass-benign    Tonsillectomy      w/adenoids    Ventral hernia repair                  No pertinent social history.                              Physical Exam    ED Triage Vitals [25 1148]   BP (!) 165/104   Pulse 94   Resp 19   Temp 97.9 °F (36.6 °C)   Temp src Temporal   SpO2 97 %   O2 Device None (Room air)       Current Vitals:   Vital Signs  BP: 106/61  Pulse: 56  Resp: 16  Temp: 97.9 °F (36.6 °C)  Temp src: Temporal  MAP (mmHg): 75    Oxygen Therapy  SpO2: 95 %  O2 Device: None (Room air)            Physical Exam  Vitals and nursing note reviewed.   Constitutional:       Appearance: She is well-developed.   HENT:      Head: Normocephalic and atraumatic.      Mouth/Throat:      Mouth: Mucous membranes are moist.   Eyes:      General: No scleral icterus.  Abdominal:      General: Bowel sounds are normal. There is no distension.      Palpations: Abdomen is soft.      Tenderness: There is abdominal tenderness. There is no guarding or rebound.      Comments: Left CVA tenderness  Mild left-sided abdominal tenderness   Skin:     General: Skin is warm and dry.   Neurological:      General: No focal deficit present.      Mental Status: She is alert and oriented to person, place, and time.      Cranial Nerves: No cranial nerve deficit.      Motor: No weakness.   Psychiatric:         Mood and Affect: Mood normal.         Behavior: Behavior normal.                 ED Course  Labs Reviewed   COMP METABOLIC PANEL (14) - Abnormal; Notable for the following components:       Result Value    Glucose 153 (*)     Creatinine 1.18 (*)     Calculated Osmolality 298 (*)     eGFR-Cr 52 (*)     All other components  within normal limits   CBC WITH DIFFERENTIAL WITH PLATELET - Abnormal; Notable for the following components:    WBC 17.1 (*)     Neutrophil Absolute Prelim 13.91 (*)     Neutrophil Absolute 13.91 (*)     Monocyte Absolute 1.12 (*)     All other components within normal limits   URINALYSIS WITH CULTURE REFLEX - Abnormal; Notable for the following components:    Blood Urine 2+ (*)     RBC Urine >10 (*)     All other components within normal limits   RAINBOW DRAW LAVENDER   RAINBOW DRAW LIGHT GREEN   RAINBOW DRAW GOLD   RAINBOW DRAW BLUE   URINE CULTURE, ROUTINE          CT ABDOMEN+PELVIS KIDNEYSTONE 2D RNDR(NO IV,NO ORAL)(CPT=74176)  Result Date: 6/14/2025  CONCLUSION:  1. Mild left hydronephrosis with left perinephric stranding.  These findings may represent recently passed calculus versus pyelonephritis.  Correlate clinically.    LOCATION:  [facility]   Dictated by (CST): López Adams MD on 6/14/2025 at 1:45 PM     Finalized by (CST): López Adams MD on 6/14/2025 at 1:50 PM                         MDM     64-year-old female with a past medical history as below including kidney stones and CRPS status post spinal cord stimulator presents from immediate care due to left flank pain radiating to left lower quadrant that started last night.     Differential includes but is not limited to ureteral calculus, pyelonephritis, diverticulitis, colitis    Labs show elevated WBC of 17K with normal hemoglobin.  Creatinine minimally elevated at 1.18 with normal BUN and electrolytes.  UA shows large blood and greater than 10 RBCs without any significant evidence of UTI.    Independent interpretation of CT abdomen/pelvis shows some inflammatory changes along the left kidney.  Radiology report reviewed as above noting mild left hydronephrosis with left perinephric stranding.  Report notes findings may represent recently passed calculus versus pyelonephritis.    Patient was treated with Dilaudid but still reports significant left flank  pain.  Will admit for further workup and management along with pain control.  Discussed with hospitalist Dr. Diaz.  Urology placed on consult.    Medical Decision Making  Amount and/or Complexity of Data Reviewed  Labs: ordered. Decision-making details documented in ED Course.  Radiology: ordered and independent interpretation performed. Decision-making details documented in ED Course.  Discussion of management or test interpretation with external provider(s): Hospitalist, urology    Risk  Parenteral controlled substances.  Decision regarding hospitalization.        Disposition and Plan     Clinical Impression:  1. Left flank pain         Disposition:  Discharge  6/14/2025  3:46 pm    Follow-up:  Jad Summers  [address] [place] [id number] [phone]    Schedule an appointment as soon as possible for a visit      Terry Prieto MD  [address]  [facility] [address]  [facility] [address] [phone]    Schedule an appointment as soon as possible for a visit            Medications Prescribed:  Current Discharge Medication List        START taking these medications    Details   cefdinir 300 MG Oral Cap Take 1 capsule (300 mg total) by mouth 2 (two) times daily for 7 days.  Qty: 14 capsule, Refills: 0                   Supplementary Documentation:

## 2025-06-14 NOTE — ED QUICK NOTES
Orders for admission, patient is aware of plan and ready to go upstairs. Any questions, please call ED RN Cydney at extension . B pod    Patient Covid vaccination status: Fully vaccinated     COVID Test Ordered in ED: None    COVID Suspicion at Admission: N/A    Running Infusions: see mar     Mental Status/LOC at time of transport: a/ox3    Other pertinent information:   CIWA score: N/A   NIH score:  N/A

## 2025-06-14 NOTE — CONSULTS
[facility] CONSULT NOTE     Radha Jones Patient Status:  Inpatient    1960 MRN [id number]   Location [facility] 3NW-A Attending Arthur Diaz,    Hosp Day # 0 PCP Jad Summers     Reason for Consultation:  L renal colic  and NV     History of Present Illness:  Radha Jones is a a(n) 64 year old female.   NV and L flank pain   Urgency to void     History:  Past Medical History[1]  Past Surgical History[2]  Family History[3]   reports that she quit smoking about 17 years ago. Her smoking use included cigarettes. She started smoking about 49 years ago. She has a 32 pack-year smoking history. She has never used smokeless tobacco. She reports that she does not drink alcohol and does not use drugs.    Allergies:  Allergies[4]    Medications:  Current Hospital Medications[5]    Review of Systems:  Pertinent items are noted in HPI.    Physical Exam:  /73 (BP Location: Left arm)   Pulse 63   Temp 98.1 °F (36.7 °C) (Oral)   Resp 16   SpO2 97%   GENERAL: well developed, well nourished, no apparent distress  EYES: sclera non-icteric, no redness   MOUTH:  moist oral mucosa, no lesions  LUNGS: normal respiratory motion without distress  GI: Abdomen soft without organomegally, no tenderness, normal bowel sounds, No CVAT       NEURO: Alert and Oriented, motor and sensory grossly non-focal   LYMPH:  No appreciable axillary, neck, or inguinal  Adenopathy  SKIN: No rashes, no discoloration  EXTREMITIES: No edema or cyanosis, no calf pain, no appreciable joint deformities     Laboratory Data:  Lab Results   Component Value Date    WBC 17.1 2025    HGB 13.6 2025    HCT 40.0 2025    .0 2025    CREATSERUM 1.18 2025    BUN 15 2025     2025    K 4.4 2025     2025    CO2 26.0 2025     2025    CA 9.9 2025    ALB 4.7 2025    ALKPHO 128 2025    BILT 0.6 2025    TP 7.8 2025    AST 26 2025     ALT 28 06/14/2025     Lab Results   Component Value Date    COLORUR Light-Yellow 06/14/2025    CLARITY Clear 06/14/2025    SPECGRAVITY 1.013 06/14/2025    GLUUR Normal 06/14/2025    BILUR Negative 06/14/2025    KETUR Negative 06/14/2025    BLOODURINE 2+ 06/14/2025    PHURINE 6.0 06/14/2025    PROUR Negative 06/14/2025    UROBILINOGEN Normal 06/14/2025    NITRITE Negative 06/14/2025    LEUUR Negative 06/14/2025     No results found for: \"PSA\"      Intake/Output Summary (Last 24 hours) at 6/14/2025 1745  Last data filed at 6/14/2025 1658  Gross per 24 hour   Intake 1100 ml   Output --   Net 1100 ml         Imaging:    CT personal review -- LEFT  K standing and mild hydro -- she probabaly just passed the stone   Additional stone in the  LLP  5 mm -- not obstructing     Impression and Plan:  Problem List[6]    Left Renal Colic -- probably just passed a distal stone and explains all te stranding around the kidney     2. LLP  K  stone - non obstructing and not the cause of her pain or NV - more incidental .  KUB to see if radiopaque.   May be a ESWL candidate     Can eat for now       UA  negative --  NO SX of UTI     Elevated WBC   reactive     Thank you for allowing me to participate in the care of your patient.    Please give me a call on my cell if you have any questions of concerns.     Justin Malagon MD   [facility]  Department of Urology  Cell: [phone]  Office :[phone]        6/14/2025  5:45 PM          [1]   Past Medical History:   Arthritis    Back pain    Calculus of kidney    Carpal tunnel syndrome    Log Date: 11/28/2012     Cervical stenosis of spine    COPD (chronic obstructive pulmonary disease) (HCC)    Degeneration of lumbar or lumbosacral intervertebral disc    DJD (degenerative joint disease) of hip    Bilateral     Esophageal reflux    Essential hypertension    Follicular cyst of ovary    left    Heartburn    High blood pressure    High cholesterol    Hx of motion sickness     Hyperlipidemia    Leg swelling    Migraines    Neuropathy    LUQ    Opiate use    Osteoporosis    Other chronic pain    Presence of other cardiac implants and grafts    Recurrent pneumonia    Renal stones    Rib fracture    Sciatica    Spinal stenosis, lumbar region, without neurogenic claudication    Trigger finger of right thumb    Trigger finger, left middle finger    Trigger finger, right index finger    Trigger finger, right ring finger    Varicose vein    Ventral hernia    Visual impairment    glasses    Wears glasses   [2]   Past Surgical History:  Procedure Laterality Date    Arthrocentesis aspir&/inj major jt/bursa w/us N/A 3/26/2015    Procedure: HIP INJECTION (PAIN);  Surgeon: Nima Jameson MD;  Location: [facility] PAIN MANAGEMENT    Arthrocentesis aspir&/inj major jt/bursa w/us  7/30/2015    Procedure: ;  Surgeon: Nima Jameson MD;  Location: [facility] FOR PAIN MANAGEMENT    Carpal tunnel release      08/2014    Colonoscopy  2010    Colonoscopy N/A 11/12/2014    Procedure: COLONOSCOPY;  Surgeon: Valentin Ramos MD;  Location:  ENDOSCOPY    D & c      Drain/inject medium joint/bursa  3/8/2012    Procedure: COCCYX;  Surgeon: Karlos Barron MD;  Location: [facility] PAIN [facility]    Drain/inject medium joint/bursa  3/29/2012    Procedure: COCCYX;  Surgeon: Karlos Barron MD;  Location: [facility]    Drain/inject medium joint/bursa  3/29/2012    Procedure: COCCYX;  Surgeon: Karlos Barron MD;  Location: [facility] PAIN [facility]    Drain/inject medium joint/bursa  1/25/2013    Procedure: COCCYX;  Surgeon: Terry Torres MD;  Location: [facility]IN [facility]    Drain/inject medium joint/bursa  2/15/2013    Procedure: COCCYX;  Surgeon: Karlos Barron MD;  Location: [facility]    Drain/inject medium joint/bursa  2/15/2013    Procedure: COCCYX;  Surgeon: Karlos Barron MD;  Location: [facility]IN [facility]    Drain/inject  medium joint/bursa  3/1/2013    Procedure: COCCYX;  Surgeon: Karlos Barron MD;  Location: [facility]    Drain/inject medium joint/bursa  3/1/2013    Procedure: COCCYX;  Surgeon: Karlos Barron MD;  [facility]    Fluor gid & loclzj ndl/cath spi dx/ther njx  11/8/2013    Procedure: COCCYX;  Surgeon: Nima Jameson MD;  [facility]    Fluor gid & loclzj ndl/cath spi dx/ther njx N/A 11/21/2014    Procedure: STELLATE GANGLION INJECTION;  Surgeon: Nima Jameson MD;  Location: [facility]    Fluor gid & loclzj ndl/cath spi dx/ther njx N/A 12/29/2014    Procedure: LUMBAR EPIDURAL;  Surgeon: Nima Jameson MD;  Location: [facility]    Fluor gid & loclzj ndl/cath spi dx/ther njx N/A 1/26/2015    Procedure: GANGLION IMPAR BLOCK;  Surgeon: Nima Jameson MD;  Location: [facility]    Fluor gid & loclzj ndl/cath spi dx/ther njx N/A 9/10/2015    Procedure: CAUDAL;  Surgeon: Nima Jameson MD;  Location: [facility]    Fluor gid & loclzj ndl/cath spi dx/ther njx N/A 11/30/2015    Procedure: CAUDAL;  Surgeon: Nima Jameson MD;  Location: [facility]    Fluoroscopic guidance needle placement  3/8/2012    Procedure: COCCYX;  Surgeon: Karlos Barron MD;  Location: [facility]    Fluoroscopic guidance needle placement  3/29/2012    Procedure: COCCYX;  Surgeon: Karlos Barron MD;  Location: [facility]    Fluoroscopic guidance needle placement  1/25/2013    Procedure: COCCYX;  Surgeon: Terry Torres MD;  Location: [facility]    Fluoroscopic guidance needle placement  2/15/2013    Procedure: COCCYX;  Surgeon: Karlos Barron MD;  Location: [facility]    Fluoroscopic guidance needle placement  10/18/2013    Procedure: COCCYX;  Surgeon: Nima Jameson MD;  Location: [facility]  [place] [facility]    Fluoroscopic guidance needle placement  11/8/2013    Procedure: COCCYX;  Surgeon: Nima Jameson MD;  Location: [facility] PAIN MANA[facility]EMENT    Hysterectomy      partial, at age 34    Inj tendon/ligament/cyst  9/25/2013    Procedure: COCCYX;  Surgeon: Nima Jameson MD;  Location: [facility]    Inj tendon/ligament/cyst  10/18/2013    Procedure: COCCYX;  Surgeon: Nima Jameson MD;  Location: [facility]    Inj tendon/ligament/cyst  11/8/2013    Procedure: COCCYX;  Surgeon: Nima Jameson MD;  Location: [facility]    Inject nerv blck,paravert sympath N/A 11/21/2014    Procedure: STELLATE GANGLION INJECTION;  Surgeon: Nima Jameson MD;  Location: [facility]    Inject nerv blck,paravert sympath N/A 12/12/2014    Procedure: STELLATE GANGLION INJECTION;  Surgeon: Nima Jameson MD;  Location: [facility]    Inject nerv blck,paravert sympath N/A 12/29/2014    Procedure: LUMBAR SYMPATHETIC INJECTION;  Surgeon: Nima Jameson MD;  Location: [facility]IN [facility]    Inject nerv blck,paravert sympath N/A 1/26/2015    Procedure: GANGLION IMPAR BLOCK;  Surgeon: Nima Jameson MD;  Location: [facility]IN [facility]    Inject nerv blck,paravert sympath N/A 3/12/2015    Procedure: GANGLION IMPAR BLOCK;  Surgeon: Nima Jameson MD;  Location: [facility]    Inject nerv blck,paravert sympath N/A 7/30/2015    Procedure: GANGLION IMPAR BLOCK;  Surgeon: Nima Jameson MD;  Location: [facility]    Inject nerv blck,paravert sympath N/A 12/22/2015    Procedure: GANGLION IMPAR BLOCK;  Surgeon: Nima Jameson MD;  Location: [facility]    Inject nerv blck,paravert sympath  1/11/2016    Procedure: ;  Surgeon: Nima Jameson MD;  Location: [facility]    Injection, anesthetic/steroid, transforaminal epidural;  lumbar/sacral, single level  9/25/2013    Procedure: TRANSFORAMINAL EPIDURAL - LUMBAR;  Surgeon: Nima Jameson MD;  Location: [facility]    Injection, anesthetic/steroid, transforaminal epidural; lumbar/sacral, single level  10/18/2013    Procedure: TRANSFORAMINAL EPIDURAL - LUMBAR;  Surgeon: Nima Jameson MD;  Location: [facility]    Injection, w/wo contrast, dx/therapeutic substance, epidural/subarachnoid; lumbar/sacral  11/8/2013    Procedure: LUMBAR EPIDURAL;  Surgeon: Nima Jameson MD;  Location: [facility] PAIN [facility]    Injection, w/wo contrast, dx/therapeutic substance, epidural/subarachnoid; lumbar/sacral N/A 11/21/2014    Procedure: LUMBAR EPIDURAL;  Surgeon: Nima Jameson MD;  Location: [facility]    Injection, w/wo contrast, dx/therapeutic substance, epidural/subarachnoid; lumbar/sacral N/A 12/29/2014    Procedure: LUMBAR EPIDURAL;  Surgeon: Nima Jameson MD;  Location: [facility]    Injection, w/wo contrast, dx/therapeutic substance, epidural/subarachnoid; lumbar/sacral N/A 1/26/2015    Procedure: LUMBAR EPIDURAL;  Surgeon: Nima Jameson MD;  Location: [facility]    Injection, w/wo contrast, dx/therapeutic substance, epidural/subarachnoid; lumbar/sacral N/A 9/10/2015    Procedure: CAUDAL;  Surgeon: Nima Jameson MD;  Location: [facility]    Injection, w/wo contrast, dx/therapeutic substance, epidural/subarachnoid; lumbar/sacral N/A 11/30/2015    Procedure: CAUDAL;  Surgeon: Nima Jameson MD;  Location: [facility]    Laparoscopic cholecystectomy  4/2010    Laparoscopy,diagnostic  2002,2011    M-sedaj by  phys perfrmg svc 5+ yr  3/8/2012    Procedure: COCCYX;  Surgeon: Karlos Barron MD;  Location: [facility]    M-sedaj by  phys perfrmg svc 5+ yr  3/29/2012    Procedure: COCCYX;  Surgeon: Karlos Barron MD;  Location:  [facility]    M-sedaj by  phys perfrmg svc 5+ yr  1/25/2013    Procedure: COCCYX;  Surgeon: Terry Torres MD;  Location: [facility]    M-sedaj by  phys perfrmg svc 5+ yr  2/15/2013    Procedure: COCCYX;  Surgeon: Karlos Barron MD;  Location: [facility]    M-sedaj by  phys perfrmg svc 5+ yr  3/1/2013    Procedure: COCCYX;  Surgeon: Karlos Barron MD;  Location: [facility]    M-sedaj by  phys perfrmg svc 5+ yr  9/25/2013    Procedure: COCCYX;  Surgeon: Nima Jameson MD;  Location: [facility]    M-sedaj by  phys perfrmg svc 5+ yr  10/18/2013    Procedure: TRANSFORAMINAL EPIDURAL - LUMBAR;  Surgeon: Nima Jameson MD;  Location: [facility]    M-sedaj by  phys perfrmg svc 5+ yr  11/8/2013    Procedure: LUMBAR EPIDURAL;  Surgeon: Nima Jameson MD;  Location: [facility]    M-sedaj by  phys perfrmg svc 5+ yr N/A 11/21/2014    Procedure: STELLATE GANGLION INJECTION;  Surgeon: Nima Jameson MD;  Location: [facility]    M-sedaj by  phys perfrmg svc 5+ yr N/A 12/12/2014    Procedure: STELLATE GANGLION INJECTION;  Surgeon: Nima Jameson MD;  Location: [facility]    M-sedaj by  phys perfrmg svc 5+ yr N/A 12/29/2014    Procedure: LUMBAR EPIDURAL;  Surgeon: Nima Jameson MD;  Location: [facility]    M-sedaj by  phys perfrmg svc 5+ yr N/A 1/26/2015    Procedure: GANGLION IMPAR BLOCK;  Surgeon: Nima Jameson MD;  Location: [facility]    M-sedaj by  phys perfrmg svc 5+ yr N/A 3/12/2015    Procedure: GANGLION IMPAR BLOCK;  Surgeon: Nima Jameson MD;  Location: [facility]    M-seda by  phys perfrmg [facility] 5+ yr  3/26/2015    Procedure: GANGLION IMPAR BLOCK;  Surgeon: Nima Jameson MD;  Location: [facility]    M-seda by  phys perfrmg  svc 5+ yr  2015    Procedure: ;  Surgeon: Nima Jameson MD;  [place] [facility]    M-sedaj by  phys perfrmg svc 5+ yr N/A 9/10/2015    Procedure: CAUDAL;  Surgeon: Nima Jameson MD;  Location: [facility]    M-sedaj by  phys perfrmg svc 5+ yr N/A 2015    Procedure: CAUDAL;  Surgeon: Nima Jameson MD;  [place] [facility]    M-sedaj by  phys perfrmg svc 5+ yr N/A 2015    Procedure: GANGLION IMPAR BLOCK;  Surgeon: Nima Jameson MD;  [place] [facility]    M-sedaj by  phys perfrmg svc 5+ yr  2016    Procedure: ;  Surgeon: Nima Jameson MD;  Location: [facility]    Nervous system surgery unlisted  3/26/2015    Procedure: GANGLION IMPAR BLOCK;  Surgeon: Nima Jameson MD;  Location: [facility]          Other surgical history      RT shoulder    Other surgical history      Wrist     Other surgical history      kidney stones    Repair epigastric hernia,reduc      Thoracotomy,remv pulm foreign body      left lung mass-benign    Tonsillectomy      w/adenoids    Ventral hernia repair     [3]   Family History  Problem Relation Age of Onset    Cancer Father         lung    Diabetes Maternal Grandfather    [4]   Allergies  Allergen Reactions    Motrin [Ibuprofen]      Edema     Other      IVP dye -unknown reation    Toradol [Ketorolac Tromethamine] UNKNOWN     Allergy to Ibuprofen   [5]   Current Facility-Administered Medications:     atorvastatin (Lipitor) tab 20 mg, 20 mg, Oral, Nightly    HYDROcodone-acetaminophen (Norco)  MG per tab 1 tablet, 1 tablet, Oral, Q8H PRN    [START ON 6/15/2025] metoprolol succinate ER (Toprol XL) 24 hr tab 50 mg, 50 mg, Oral, Daily Beta Blocker    pantoprazole (Protonix) DR tab 40 mg, 40 mg, Oral, Before breakfast    acetaminophen (Tylenol Extra Strength) tab 1,000 mg, 1,000 mg, Oral, Q8H PRN    melatonin tab 3  mg, 3 mg, Oral, Nightly PRN    polyethylene glycol (PEG 3350) (Miralax) 17 g oral packet 17 g, 17 g, Oral, Daily PRN    sennosides (Senokot) tab 17.2 mg, 17.2 mg, Oral, Nightly PRN    bisacodyl (Dulcolax) 10 MG rectal suppository 10 mg, 10 mg, Rectal, Daily PRN    fleet enema (Fleet) rectal enema 133 mL, 1 enema, Rectal, Once PRN    ondansetron (Zofran) 4 MG/2ML injection 4 mg, 4 mg, Intravenous, Q6H PRN    prochlorperazine (Compazine) 10 MG/2ML injection 5 mg, 5 mg, Intravenous, Q8H PRN    benzonatate (Tessalon) cap 200 mg, 200 mg, Oral, TID PRN    guaiFENesin (Robitussin) 100 MG/5 ML oral liquid 200 mg, 200 mg, Oral, Q4H PRN    glycerin-hypromellose- (Artificial Tears) 0.2-0.2-1 % ophthalmic solution 1 drop, 1 drop, Both Eyes, QID PRN    sodium chloride (Saline Mist) 0.65 % nasal solution 1 spray, 1 spray, Each Nare, Q3H PRN    sodium chloride 0.9% infusion, , Intravenous, Continuous    enoxaparin (Lovenox) 40 MG/0.4ML SUBQ injection 40 mg, 40 mg, Subcutaneous, Nightly    HYDROmorphone (Dilaudid) 1 MG/ML injection 0.2 mg, 0.2 mg, Intravenous, Q2H PRN **OR** HYDROmorphone (Dilaudid) 1 MG/ML injection 0.4 mg, 0.4 mg, Intravenous, Q2H PRN **OR** HYDROmorphone (Dilaudid) 1 MG/ML injection 0.8 mg, 0.8 mg, Intravenous, Q2H PRN  [6]   Patient Active Problem List  Diagnosis    Coccydynia    Bilateral carpal tunnel syndrome    Degeneration of lumbar or lumbosacral intervertebral disc    Migraines    Neuropathy    Essential hypertension    GERD (gastroesophageal reflux disease)    Osteopenia of spine    Complex regional pain syndrome type 2    Spinal cord stimulator status    Renal stones    Pure hypercholesterolemia    Left flank pain    Hydronephrosis, left

## 2025-06-15 VITALS
WEIGHT: 173.69 LBS | SYSTOLIC BLOOD PRESSURE: 132 MMHG | OXYGEN SATURATION: 98 % | TEMPERATURE: 98 F | BODY MASS INDEX: 29 KG/M2 | DIASTOLIC BLOOD PRESSURE: 51 MMHG | HEART RATE: 62 BPM | RESPIRATION RATE: 16 BRPM

## 2025-06-15 LAB
ANION GAP SERPL CALC-SCNC: 8 MMOL/L (ref 0–18)
BASOPHILS # BLD AUTO: 0.03 X10(3) UL (ref 0–0.2)
BASOPHILS NFR BLD AUTO: 0.3 %
BUN BLD-MCNC: 13 MG/DL (ref 9–23)
CALCIUM BLD-MCNC: 8.6 MG/DL (ref 8.7–10.6)
CHLORIDE SERPL-SCNC: 109 MMOL/L (ref 98–112)
CO2 SERPL-SCNC: 24 MMOL/L (ref 21–32)
CREAT BLD-MCNC: 0.79 MG/DL (ref 0.55–1.02)
EGFRCR SERPLBLD CKD-EPI 2021: 83 ML/MIN/1.73M2 (ref 60–?)
EOSINOPHIL # BLD AUTO: 0.14 X10(3) UL (ref 0–0.7)
EOSINOPHIL NFR BLD AUTO: 1.4 %
ERYTHROCYTE [DISTWIDTH] IN BLOOD BY AUTOMATED COUNT: 12.9 %
GLUCOSE BLD-MCNC: 105 MG/DL (ref 70–99)
HCT VFR BLD AUTO: 34.8 % (ref 35–48)
HGB BLD-MCNC: 11.4 G/DL (ref 12–16)
IMM GRANULOCYTES # BLD AUTO: 0.03 X10(3) UL (ref 0–1)
IMM GRANULOCYTES NFR BLD: 0.3 %
LYMPHOCYTES # BLD AUTO: 3.42 X10(3) UL (ref 1–4)
LYMPHOCYTES NFR BLD AUTO: 34.4 %
MAGNESIUM SERPL-MCNC: 1.9 MG/DL (ref 1.6–2.6)
MCH RBC QN AUTO: 30.8 PG (ref 26–34)
MCHC RBC AUTO-ENTMCNC: 32.8 G/DL (ref 31–37)
MCV RBC AUTO: 94.1 FL (ref 80–100)
MONOCYTES # BLD AUTO: 0.59 X10(3) UL (ref 0.1–1)
MONOCYTES NFR BLD AUTO: 5.9 %
NEUTROPHILS # BLD AUTO: 5.74 X10 (3) UL (ref 1.5–7.7)
NEUTROPHILS # BLD AUTO: 5.74 X10(3) UL (ref 1.5–7.7)
NEUTROPHILS NFR BLD AUTO: 57.7 %
OSMOLALITY SERPL CALC.SUM OF ELEC: 292 MOSM/KG (ref 275–295)
PLATELET # BLD AUTO: 264 10(3)UL (ref 150–450)
POTASSIUM SERPL-SCNC: 3.7 MMOL/L (ref 3.5–5.1)
RBC # BLD AUTO: 3.7 X10(6)UL (ref 3.8–5.3)
SODIUM SERPL-SCNC: 141 MMOL/L (ref 136–145)
WBC # BLD AUTO: 10 X10(3) UL (ref 4–11)

## 2025-06-15 PROCEDURE — 99239 HOSP IP/OBS DSCHRG MGMT >30: CPT | Performed by: INTERNAL MEDICINE

## 2025-06-15 RX ORDER — POTASSIUM CHLORIDE 1500 MG/1
40 TABLET, EXTENDED RELEASE ORAL ONCE
Status: COMPLETED | OUTPATIENT
Start: 2025-06-15 | End: 2025-06-15

## 2025-06-15 NOTE — DISCHARGE SUMMARY
[facility]     Radha Jones Patient Status:  Inpatient    1960 MRN [id number]   Location [facility] 3NW-A Attending No att. providers found   Hosp Day # 1 PCP Jad Summers     Date of Admission: 2025  Date of Discharge:  6/15/2025     Discharge Disposition: Home or Self Care    Discharge Diagnosis:  #Mild left hydronephrosis with left perinephric stranding     History of Present Illness:      Radha Jones is a 64 year old female with past medical history of chronic back pain, nephrolithiasis presented to the emergency department with left flank pain.     Patient endorses severe onset of left flank/abdominal pain radiating to her left groin earlier today.  She initially went to immediate care.  She took her usual 10 mg of Norco which she has for her chronic back pain and it made no difference.  She sat on the toilet for a while and was unable to urinate.  Otherwise endorses no urinary issues prior to this.  Normal state of health.  No fever or chills.    Brief Synopsis: Patient was admitted to the hospital, no further pain, evaluated by urology, suspected passed stone in ED, discharged home stable condition.    Lace+ Score: 34  59-90 High Risk  29-58 Medium Risk  0-28   Low Risk       TCM Follow-Up Recommendation:  LACE 29-58: Moderate Risk of readmission after discharge from the hospital.      Procedures during hospitalization:   N/a    Incidental or significant findings and recommendations (brief descriptions):  N/a    Lab/Test results pending at Discharge:   N/a    Consultants:  Urology     Discharge Medication List:     Discharge Medications        CONTINUE taking these medications        Instructions Prescription details   atorvastatin 20 MG Tabs  Commonly known as: Lipitor      Take 1 tablet (20 mg total) by mouth nightly.   Quantity: 90 tablet  Refills: 3     Denavir 1 % Crea  Generic drug: Penciclovir      Apply 1 Application topically every 2 (two) hours as  needed.   Quantity: 5 g  Refills: 0     Fiber 625 MG Tabs      Take by mouth.   Refills: 0     HYDROcodone-acetaminophen  MG Tabs  Commonly known as: Norco      Take 1 tablet by mouth every 8 (eight) hours as needed for Pain.   Quantity: 90 tablet  Refills: 0     lidocaine 5 % Ptch  Commonly known as: Lidoderm      APPLY 1 PATCH TOPICALLY TO THE SKIN DAILY FOR 12 HOURS ON AND 12 HOURS OFF   Quantity: 30 patch  Refills: 0     metoprolol succinate ER 50 MG Tb24  Commonly known as: Toprol XL      TAKE 1 TABLET(50 MG) BY MOUTH DAILY   Quantity: 30 tablet  Refills: 0     Naloxone HCl 4 MG/0.1ML Liqd      4 mg by Nasal route as needed. If patient remains unresponsive, repeat dose in other nostril 2-5 minutes after first dose.   Quantity: 1 kit  Refills: 0     ondansetron 4 mg tablet  Commonly known as: Zofran      Take 1 tablet (4 mg total) by mouth every 8 (eight) hours as needed for Nausea.   Quantity: 20 tablet  Refills: 0     pantoprazole 40 MG Tbec  Commonly known as: Protonix      TAKE 1 TABLET(40 MG) BY MOUTH EVERY MORNING BEFORE BREAKFAST   Quantity: 30 tablet  Refills: 11            ASK your doctor about these medications        Instructions Prescription details   potassium chloride 20 MEQ Tbcr  Commonly known as: Klor-Con M20      Take 1 tablet (20 mEq total) by mouth daily.   Quantity: 90 tablet  Refills: 2              [place] n[place]    Follow-up appointment:   Jad Summers  [address]  [facility] [address] [phone]    Schedule an appointment as soon as possible for a visit      Aidan Malagon MD  [address] [address] [phone]    Follow up in 6 month(s)      Appointments for Next 30 Days 6/15/2025 - 7/15/2025      None            Vital signs:  Temp:  [97.7 °F (36.5 °C)-98.3 °F (36.8 °C)] 97.8 °F (36.6 °C)  Pulse:  [50-71] 62  Resp:  [13-18] 16  BP: ()/(47-83) 132/51  SpO2:  [90 %-100 %] 98 %    Physical Exam:    General: No acute distress    Lungs: clear to auscultation  Cardiovascular: S1, S2  Abdomen: Soft      -----------------------------------------------------------------------------------------------  PATIENT DISCHARGE INSTRUCTIONS: See electronic chart    Arthur Diaz DO    Total time spent on discharge plannin minutes     The  [employer] Cures Act makes medical notes like these available to patients in the interest of transparency. Please be advised this is a medical document. Medical documents are intended to carry relevant information, facts as evident, and the clinical opinion of the practitioner. The medical note is intended as peer to peer communication and may appear blunt or direct. It is written in medical language and may contain abbreviations or verbiage that are unfamiliar.

## 2025-06-15 NOTE — PROGRESS NOTES
A&Ox4. VSS. RA. .  Telemetry: NSR  GI: Abdomen soft, nondistended. Denies passing gas.  Denies nausea, SOB, and chest pain.   : Voids.  Medicated per MAR.   Up ad yenni.   Diet: regular  IVF running per order.  All appropriate safety measures in place. All questions and concerns addressed. Plan of care ongoing.

## 2025-06-15 NOTE — PROGRESS NOTES
[facility] PROGRESS NOTE     Radha Jones Patient Status:  Inpatient    1960 MRN [id number]   [facility] 3NW-A Attending Arthur Diaz,    Hosp Day # 1 PCP Jad Summers     Subjective:  Radha Jones is a(n) 64 year old female, Pssed  stone 0- feels  OK - min pain   Void OK     Still has  a  5 mm stone in the LLP       Objective:  Blood pressure 132/51, pulse 62, temperature 97.8 °F (36.6 °C), temperature source Oral, resp. rate 16, weight 173 lb 11.2 oz (78.8 kg), SpO2 98%, not currently breastfeeding.  General appearance: alert, appears stated age and cooperative  Eyes: sclera non-icteric, no redness   Mouth:  moist oral mucosa, no lesions  Lungs: Unlabored respirations  Abdomen: Soft, non-tender, not distended, NO CVAT   Extremities: no edema, no calf pain, no skin discoloration      Lab Results   Component Value Date    WBC 10.0 06/15/2025    HGB 11.4 06/15/2025    HCT 34.8 06/15/2025    .0 06/15/2025    CREATSERUM 0.79 06/15/2025    BUN 13 06/15/2025     06/15/2025    K 3.7 06/15/2025     06/15/2025    CO2 24.0 06/15/2025     06/15/2025    CA 8.6 06/15/2025    ALB 4.7 2025    ALKPHO 128 2025    BILT 0.6 2025    TP 7.8 2025    AST 26 2025    ALT 28 2025    MG 1.9 06/15/2025     Lab Results   Component Value Date    COLORUR Light-Yellow 2025    CLARITY Clear 2025    SPECGRAVITY 1.013 2025    GLUUR Normal 2025    BILUR Negative 2025    KETUR Negative 2025    BLOODURINE 2+ 2025    PHURINE 6.0 2025    PROUR Negative 2025    UROBILINOGEN Normal 2025    NITRITE Negative 2025    LEUUR Negative 2025     No results found for: \"PSA\"      Intake/Output Summary (Last 24 hours) at 6/15/2025 1014  Last data filed at 6/15/2025 0718  Gross per 24 hour   Intake 2234 ml   Output 290 ml   Net 1944 ml       Images: CT - passed stone LLP  5 mm stone     Assessment and  Plan:       Long time stone former   SP  passed stone   LLP  5 mm stone     PLAN     DC home   FU in  6 mo with Kidney US   NO DC meds needed         Please give me a call on my cell if you have any questions of concerns.     Justin Malagon MD   [facility]  Department of Urology  Cell: [phone]  Office :[phone]    10:14 AM

## 2025-06-15 NOTE — PROGRESS NOTES
Neuro: Alert and oriented x4.   Vital signs stable  Respiratory: On room air. Continuous pulse oximeter.   Cardiac: Tele-NSR  GI: Abdomen soft, nondistended. Denies nausea. Passing gas. Last BM: 6/13  : Voids.  Integumentary: intact  Pain: controlled with PRN pain medications  Activity: Up ad yenni  Diet: Regular   IV Fluids: infusing per order.  All appropriate safety measures in place. All questions and concerns addressed.

## 2025-06-15 NOTE — PLAN OF CARE
Assumed care 0730.  Alert and oriented x4.  RA  Electrolyte Cardiac protocol- k replaced  Lovenox for VTE prophylaxis  PIV infusing 0.9 @ 125 ml/hr.  Reg diet  PRN Norco given for pain.  Up at yenni  Possible discharge today

## 2025-06-17 NOTE — PAYOR COMM NOTE
--------------  ADMISSION REVIEW     Payor: MAMIE DÍAZ  Subscriber #:  [id number]  Authorization Number: [id number]    Admit date: 6/14/25  Admit time:  5:12 PM     REVIEW DOCUMENTATION:  ED Provider Notes signed by Emiliana Padron MD at 6/14/2025  4:24 PM       Author: Emiliana Padron MD Service: -- Author Type: Physician    Filed: 6/14/2025  4:24 PM Date of Service: 6/14/2025 12:14 PM Status: Addendum     Patient Seen in: [facility] Emergency Department    History  Chief Complaint   Patient presents with    Flank Pain     Stated Complaint: Possible kidney stone -    HPI  64-year-old female with a past medical history as below including kidney stones and CRPS status post spinal cord stimulator presents from immediate care due to left flank pain radiating to left lower quadrant that started last night.  She reports nausea with 2-3 episodes of vomiting.  Patient reports some increased urinary frequency and feels like she needs to have a bowel movement.  Denies dysuria or hematuria.  Denies fever or chills.    Physical Exam  ED Triage Vitals [06/14/25 1148]   BP (!) 165/104   Pulse 94   Resp 19   Temp 97.9 °F (36.6 °C)   Temp src Temporal   SpO2 97 %   O2 Device None (Room air)     Vital Signs  BP: 106/61  Pulse: 56  Resp: 16  Temp: 97.9 °F (36.6 °C)  Temp src: Temporal  MAP (mmHg): 75    Oxygen Therapy  SpO2: 95 %  O2 Device: None (Room air)    Physical Exam  Vitals and nursing note reviewed.   Constitutional:       Appearance: She is well-developed.   HENT:      Head: Normocephalic and atraumatic.      Mouth/Throat:      Mouth: Mucous membranes are moist.   Eyes:      General: No scleral icterus.  Abdominal:      General: Bowel sounds are normal. There is no distension.      Palpations: Abdomen is soft.      Tenderness: There is abdominal tenderness. There is no guarding or rebound.      Comments: Left CVA tenderness  Mild left-sided abdominal tenderness   Skin:     General: Skin is warm and dry.    Neurological:      General: No focal deficit present.      Mental Status: She is alert and oriented to person, place, and time.      Cranial Nerves: No cranial nerve deficit.      Motor: No weakness.   Psychiatric:         Mood and Affect: Mood normal.         Behavior: Behavior normal.     ED Course  Labs Reviewed   COMP METABOLIC PANEL (14) - Abnormal; Notable for the following components:       Result Value    Glucose 153 (*)     Creatinine 1.18 (*)     Calculated Osmolality 298 (*)     eGFR-Cr 52 (*)     All other components within normal limits   CBC WITH DIFFERENTIAL WITH PLATELET - Abnormal; Notable for the following components:    WBC 17.1 (*)     Neutrophil Absolute Prelim 13.91 (*)     Neutrophil Absolute 13.91 (*)     Monocyte Absolute 1.12 (*)     All other components within normal limits   URINALYSIS WITH CULTURE REFLEX - Abnormal; Notable for the following components:    Blood Urine 2+ (*)     RBC Urine >10 (*)     All other components within normal limits   URINE CULTURE, ROUTINE     CT ABDOMEN+PELVIS KIDNEYSTONE 2D RNDR(NO IV,NO ORAL)(CPT=74176)  Result Date: 6/14/2025  CONCLUSION:  1. Mild left hydronephrosis with left perinephric stranding.  These findings may represent recently passed calculus versus pyelonephritis.  Correlate clinically.         MDM  64-year-old female with a past medical history as below including kidney stones and CRPS status post spinal cord stimulator presents from immediate care due to left flank pain radiating to left lower quadrant that started last night.     Differential includes but is not limited to ureteral calculus, pyelonephritis, diverticulitis, colitis    Labs show elevated WBC of 17K with normal hemoglobin.  Creatinine minimally elevated at 1.18 with normal BUN and electrolytes.  UA shows large blood and greater than 10 RBCs without any significant evidence of UTI.    Independent interpretation of CT abdomen/pelvis shows some inflammatory changes along the left  kidney.  Radiology report reviewed as above noting mild left hydronephrosis with left perinephric stranding.  Report notes findings may represent recently passed calculus versus pyelonephritis.    Patient was treated with Dilaudid but still reports significant left flank pain.  Will admit for further workup and management along with pain control.  Discussed with hospitalist Dr. Diaz.  Urology placed on consult.    Medical Decision Making  Amount and/or Complexity of Data Reviewed  Labs: ordered. Decision-making details documented in ED Course.  Radiology: ordered and independent interpretation performed. Decision-making details documented in ED Course.  Discussion of management or test interpretation with external provider(s): Hospitalist, urology    Disposition and Plan     Clinical Impression:  1. Left flank pain       Disposition:  Discharge  6/14/2025  3:46 pm    Emiliana Padron MD on 6/14/2025  4:24 PM      History and Physical   Chief Complaint: Flank Pain     History of Present Illness:   64 year old female with past medical history of chronic back pain, nephrolithiasis presented to the emergency department with left flank pain.     Patient endorses severe onset of left flank/abdominal pain radiating to her left groin earlier today.  She initially went to immediate care.  She took her usual 10 mg of Norco which she has for her chronic back pain and it made no difference.  She sat on the toilet for a while and was unable to urinate.  Otherwise endorses no urinary issues prior to this.  Normal state of health.  No fever or chills.      Lab 06/14/25  1203   RBC 4.38   HGB 13.6   HCT 40.0   MCV 91.3   MCH 31.1   MCHC 34.0   RDW 12.6   NEPRELIM 13.91*   WBC 17.1*   .0      *   BUN 15   CREATSERUM 1.18*   EGFRCR 52*   CA 9.9   ALB 4.7      K 4.4      CO2 26.0   ALKPHO 128   AST 26   ALT 28   BILT 0.6   TP 7.8       Assessment & Plan:  #Mild left hydronephrosis with left perinephric  stranding   -no stone visualized, passed?  -Urine without evidence of infection  -Urology consult  -IVF, pain control     #Leukocytosis/SIRS - no sepsis   -likely stress/reactive, monitor for any infectious sx and trend      #HTN  -Metoprolol        UROLOGY CONSULT NOTE   Reason for Consultation:  L renal colic  and NV      History of Present Illness:  64 year old female w/ NV and L flank pain   Urgency to void       Component Value Date     COLORUR Light-Yellow 06/14/2025     CLARITY Clear 06/14/2025     SPECGRAVITY 1.013 06/14/2025     GLUUR Normal 06/14/2025     BILUR Negative 06/14/2025     KETUR Negative 06/14/2025     BLOODURINE 2+ 06/14/2025     PHURINE 6.0 06/14/2025     PROUR Negative 06/14/2025     UROBILINOGEN Normal 06/14/2025     NITRITE Negative 06/14/2025     LEUUR Negative 06/14/2025 6/14/2025 1658      Gross per 24 hour   Intake 1100 ml   Output --   Net 1100 ml     CT personal review -- LEFT  K standing and mild hydro -- she probabaly just passed the stone   Additional stone in the  LLP  5 mm -- not obstructing     Impression and Plan:  Left Renal Colic -- probably just passed a distal stone and explains all the stranding around the kidney      2. LLP  K  stone - non obstructing and not the cause of her pain or NV - more incidental   KUB to see if radiopaque.   May be a ESWL candidate      Can eat for now   UA  negative --  NO SX of UTI   Elevated WBC   reactive       6/15/2025  UROLOGY PROGRESS NOTE   Passed  stone 0- feels  OK - min pain   Void OK    Still has  a  5 mm stone in the LLP     Component Value Date     WBC 10.0 06/15/2025     HGB 11.4 06/15/2025     HCT 34.8 06/15/2025     .0 06/15/2025     CREATSERUM 0.79 06/15/2025     BUN 13 06/15/2025      06/15/2025     K 3.7 06/15/2025      06/15/2025     CO2 24.0 06/15/2025      06/15/2025     CA 8.6 06/15/2025     MG 1.9 06/15/2025        6/15/2025 0718      Gross per 24 hour   Intake 2234 ml   Output 290 ml    Net 1944 ml         Images: CT - passed stone LLP  5 mm stone      Assessment and Plan:     Long time stone former   SP  passed stone   LLP  5 mm stone      PLAN    DC home   FU in  6 mo with Kidney US   NO DC meds needed          MEDICATIONS ADMINISTERED:  Medications 06/14/25 06/15/25   atorvastatin (Lipitor) tab 20 mg  Dose: 20 mg  Freq: Nightly Route: OR  Start: 06/14/25 2100 End: 06/15/25 1324    2133 AK-Given          1324-D/C'd          cefTRIAXone (Rocephin) 2 g in sodium chloride 0.9% 100mL IVPB-LUCA  Dose: 2 g  Freq: Once Route: IV  Last Dose: Stopped (06/14/25 1644)  Start: 06/14/25 1559 End: 06/14/25 1644   Admin Instructions:   Ceftriaxone must NOT be administered simultaneously with calcium containing IV solutions. Includes Y-site as well.  In patients other than neonates ceftriaxone and calcium containing products may administered sequentially, provided the line is flushed in between administrations.   Order specific questions:       1614 MA-New Bag   1634 MM-Handoff   1644 MA-Stopped           diphenhydrAMINE (Benadryl) 50 mg/mL injection 25 mg  Dose: 25 mg  Freq: Once Route: IV  Start: 06/14/25 1254 End: 06/14/25 1258    1258 JS-Given             enoxaparin (Lovenox) 40 MG/0.4ML SUBQ injection 40 mg  Dose: 40 mg  Freq: Nightly Route: SC  Start: 06/14/25 2100 End: 06/15/25 1324   Admin Instructions:   Only administer Enoxaparin subcutaneously into the abdomen unless directed otherwise by a physician. Alternate injection sites between the left and right anterolateral and left and right posterolateral abdominal wall.    2134 AK-Given          1324-D/C'd          HYDROmorphone (Dilaudid) 1 MG/ML injection 0.5 mg  Dose: 0.5 mg  Freq: Once Route: IV  Start: 06/14/25 1526 End: 06/14/25 1539    1539 MA-Given             HYDROmorphone (Dilaudid) 1 MG/ML injection 1 mg  Dose: 1 mg  Freq: Once Route: IV  Start: 06/14/25 1214 End: 06/14/25 1241    1241 KM-Given             ondansetron (Zofran) 4 MG/2ML  injection 4 mg  Dose: 4 mg  Freq: Once Route: IV  Start: 06/14/25 1214 End: 06/14/25 1240    1240 KM-Given             pantoprazole (Protonix) DR tab 40 mg  Dose: 40 mg  Freq: Before breakfast Route: OR  Start: 06/14/25 1715 End: 06/15/25 1324   Admin Instructions:   Do not crush    (1715 AK)-Not Given          0506 AK-Given   1324-D/C'd         potassium chloride (Klor-Con M20) tab 40 mEq  Dose: 40 mEq  Freq: Once Route: OR  Start: 06/15/25 0715 End: 06/15/25 0905   Admin Instructions:   Do not crush     0905 TL-Given            sodium chloride 0.9 % IV bolus 1,000 mL  Dose: 1,000 mL  Freq: Once Route: IV  Last Dose: Stopped (06/14/25 1658)  Start: 06/14/25 1214 End: 06/14/25 1658    1241 KM-New Bag   1634 MM-Handoff   1658 MA-Stopped             sodium chloride 0.9% infusion  Rate: 125 mL/hr  Freq: Continuous Route: IV  Last Dose: Stopped (06/15/25 1026)  Start: 06/14/25 1715 End: 06/15/25 1324    1715 KD-New Bag          1026 TL-Stopped   1324-D/C'd           guaiFENesin (Robitussin) 100 MG/5 ML oral liquid 200 mg  Dose: 200 mg  Freq: Every 4 hours PRN Route: OR  PRN Reason: cough  Start: 06/14/25 1713 End: 06/15/25 1324     1324-D/C'd          HYDROcodone-acetaminophen (Norco)  MG per tab 1 tablet  Dose: 1 tablet  Freq: Every 8 hours PRN Route: OR  PRN Reason: moderate pain  Start: 06/14/25 1713 End: 06/15/25 1324     0905 TL-Given   1324-D/C'd         Or   HYDROmorphone (Dilaudid) 1 MG/ML injection 0.4 mg  Dose: 0.4 mg  Freq: Every 2 hour PRN Route: IV  PRN Reason: moderate pain  Start: 06/14/25 1713 End: 06/15/25 1324   Admin Instructions:   Use PRN reason as a guide and follow range order policy. If oral pain meds are ordered and patient can tolerate oral intake, start with PRN oral pain medications first.    1804 JJ-Given   2133 AK-Given         1324-D/C'd          sennosides (Senokot) tab 17.2 mg  Dose: 17.2 mg  Freq: Nightly PRN Route: OR  PRN Comment: constipation, as needed if no bowel movement  that day  Start: 06/14/25 1713 End: 06/15/25 1324   Admin Instructions:   Use after MiraLAX.    Use prior to Dulcolax or Fleet's Enema.    2133 AK-Given          1324-D/C'd              Vitals      Date/Time Temp Pulse Resp BP SpO2 Weight O2 Device O2 Flow Rate (L/min) [facility]    06/15/25 0718 97.8 °F (36.6 °C) 62 16 132/51 98 % -- None (Room air) -- AK    06/15/25 0506 -- 59 -- -- -- -- -- -- AKA    06/15/25 0427 97.7 °F (36.5 °C) 65 18 127/67 -- -- None (Room air) -- YI    06/15/25 0040 98.3 °F (36.8 °C) 66 16 97/47 -- -- None (Room air) -- YI    06/14/25 2141 98 °F (36.7 °C) 70 16 112/62 -- -- None (Room air) -- YI    06/14/25 1751 -- -- -- -- -- 173 lb 11.2 oz (78.8 kg) -- -- IS    06/14/25 1720 98.1 °F (36.7 °C) -- -- 133/73 97 % -- None (Room air) -- JJ    06/14/25 1633 -- 63 16 -- -- -- None (Room air) -- MM    06/14/25 1630 -- 71 16 125/83 91 % -- None (Room air) -- MM    06/14/25 1600 -- 53 13 122/73 93 % -- -- -- MA    06/14/25 1530 -- 56 16 106/61 95 % -- -- -- MA    06/14/25 1500 -- 52 14 100/51 90 % -- None (Room air) -- KM    06/14/25 1430 -- 60 15 114/60 98 % -- None (Room air) -- KM    06/14/25 1345 -- 50 13 118/71 100 % -- None (Room air) -- KM    06/14/25 1245 -- 90 25 141/80 92 % -- None (Room air) --     06/14/25 1157 -- -- -- -- -- -- None (Room air) --     06/14/25 1148 97.9 °F (36.6 °C) 94 19 165/104 97 % -- None (Room air) --        FOR REVIEW/APPROVAL OF INPT ADMISSION

## 2025-06-17 NOTE — PAYOR COMM NOTE
--------------  DISCHARGE REVIEW    Payor: [facility]  Subscriber #:  [id number]  Authorization Number: [id number]    Admit date: 25  Admit time:   5:12 PM  Discharge Date: 6/15/2025 11:24 AM     Admitting Physician: Arthur Diaz DO  Primary Care Physician: Jad Summers    Discharge Summary signed by Arthur Diaz DO at 6/15/2025  1:41 PM       Author: Arthur iDaz DO Specialty: HOSPITALIST, Internal Medicine Author Type: Physician    Filed: 6/15/2025  1:41 PM Date of Service: 6/15/2025  1:39 PM Status: Signed     [facility]     Radha Jones Patient Status:  Inpatient    1960 MRN [id number]   Location [facility] 3N-A Attending No att. providers found   Hosp Day # 1 PCP Jad Summers     Date of Admission: 2025  Date of Discharge:  6/15/2025     Discharge Disposition: Home or Self Care    Discharge Diagnosis:  #Mild left hydronephrosis with left perinephric stranding     History of Present Illness:   64 year old female with past medical history of chronic back pain, nephrolithiasis presented to the emergency department with left flank pain.     Patient endorses severe onset of left flank/abdominal pain radiating to her left groin earlier today.  She initially went to immediate care.  She took her usual 10 mg of Norco which she has for her chronic back pain and it made no difference.  She sat on the toilet for a while and was unable to urinate.  Otherwise endorses no urinary issues prior to this.  Normal state of health.  No fever or chills.    Brief Synopsis: Patient was admitted to the hospital, no further pain, evaluated by urology, suspected passed stone in ED, discharged home stable condition.    Lace+ Score: 34  59-90 High Risk  29-58 Medium Risk  0-28   Low Risk     TCM Follow-Up Recommendation:  LACE 29-58: Moderate Risk of readmission after discharge from the hospital.    Consultants:  Urology     Discharge Medication List:  CONTINUE taking these  medications        Instructions Prescription details   atorvastatin 20 MG Tabs  Commonly known as: Lipitor      Take 1 tablet (20 mg total) by mouth nightly.   Quantity: 90 tablet  Refills: 3     Denavir 1 % Crea  Generic drug: Penciclovir      Apply 1 Application topically every 2 (two) hours as needed.   Quantity: 5 g  Refills: 0     Fiber 625 MG Tabs      Take by mouth.   Refills: 0     HYDROcodone-acetaminophen  MG Tabs  Commonly known as: Norco      Take 1 tablet by mouth every 8 (eight) hours as needed for Pain.   Quantity: 90 tablet  Refills: 0     lidocaine 5 % Ptch  Commonly known as: Lidoderm      APPLY 1 PATCH TOPICALLY TO THE SKIN DAILY FOR 12 HOURS ON AND 12 HOURS OFF   Quantity: 30 patch  Refills: 0     metoprolol succinate ER 50 MG Tb24  Commonly known as: Toprol XL      TAKE 1 TABLET(50 MG) BY MOUTH DAILY   Quantity: 30 tablet  Refills: 0     Naloxone HCl 4 MG/0.1ML Liqd      4 mg by Nasal route as needed. If patient remains unresponsive, repeat dose in other nostril 2-5 minutes after first dose.   Quantity: 1 kit  Refills: 0     ondansetron 4 mg tablet  Commonly known as: Zofran      Take 1 tablet (4 mg total) by mouth every 8 (eight) hours as needed for Nausea.   Quantity: 20 tablet  Refills: 0     pantoprazole 40 MG Tbec  Commonly known as: Protonix      TAKE 1 TABLET(40 MG) BY MOUTH EVERY MORNING BEFORE BREAKFAST   Quantity: 30 tablet  Refills: 11            ASK your doctor about these medications        Instructions Prescription details   potassium chloride 20 MEQ Tbcr  Commonly known as: Klor-Con M20      Take 1 tablet (20 mEq total) by mouth daily.   Quantity: 90 tablet  Refills: 2       Follow-up appointment:   Jad Summers  [address] [address] [phone]    Schedule an appointment as soon as possible for a visit      Aidan Malagon MD  [address] [address] [phone]    Follow up in 6 month(s)    Vital signs:  Temp:  [97.7  °F (36.5 °C)-98.3 °F (36.8 °C)] 97.8 °F (36.6 °C)  Pulse:  [50-71] 62  Resp:  [13-18] 16  BP: ()/(47-83) 132/51  SpO2:  [90 %-100 %] 98 %    Physical Exam:    General: No acute distress   Lungs: clear to auscultation  Cardiovascular: S1, S2  Abdomen: Soft    PATIENT DISCHARGE INSTRUCTIONS: See electronic chart    Arthur Diaz DO  6/15/2025  1:41 PM      REVIEWER COMMENTS      FOR FINAL REVIEW/APPROVAL OF ALL INPT DAYS

## 2025-07-21 ENCOUNTER — OFFICE VISIT (OUTPATIENT)
Dept: ORTHOPEDICS CLINIC | Facility: CLINIC | Age: 65
End: 2025-07-21

## 2025-07-21 VITALS — HEIGHT: 65 IN | WEIGHT: 173 LBS | BODY MASS INDEX: 28.82 KG/M2

## 2025-07-21 DIAGNOSIS — M65.331 TRIGGER MIDDLE FINGER OF RIGHT HAND: ICD-10-CM

## 2025-07-21 DIAGNOSIS — M65.321 TRIGGER INDEX FINGER OF RIGHT HAND: Primary | ICD-10-CM

## 2025-07-21 DIAGNOSIS — M18.11 ARTHRITIS OF CARPOMETACARPAL (CMC) JOINT OF RIGHT THUMB: ICD-10-CM

## 2025-07-21 DIAGNOSIS — M65.351 TRIGGER LITTLE FINGER OF RIGHT HAND: ICD-10-CM

## 2025-07-21 DIAGNOSIS — M65.341 TRIGGER RING FINGER OF RIGHT HAND: ICD-10-CM

## 2025-07-21 PROCEDURE — 3008F BODY MASS INDEX DOCD: CPT | Performed by: ORTHOPAEDIC SURGERY

## 2025-07-21 PROCEDURE — 99214 OFFICE O/P EST MOD 30 MIN: CPT | Performed by: ORTHOPAEDIC SURGERY

## 2025-07-22 RX ORDER — MULTIVIT-MIN/IRON FUM/FOLIC AC 7.5 MG-4
1 TABLET ORAL DAILY
COMMUNITY

## 2025-07-24 ENCOUNTER — LABORATORY ENCOUNTER (OUTPATIENT)
Dept: LAB | Age: 65
End: 2025-07-24
Attending: ORTHOPAEDIC SURGERY
Payer: MEDICARE

## 2025-07-24 ENCOUNTER — EKG ENCOUNTER (OUTPATIENT)
Dept: LAB | Age: 65
End: 2025-07-24
Attending: ORTHOPAEDIC SURGERY
Payer: MEDICARE

## 2025-07-24 DIAGNOSIS — M65.321 TRIGGER INDEX FINGER OF RIGHT HAND: ICD-10-CM

## 2025-07-24 LAB
ATRIAL RATE: 55 BPM
CHLORIDE SERPL-SCNC: 106 MMOL/L (ref 98–112)
CO2 SERPL-SCNC: 30 MMOL/L (ref 21–32)
P AXIS: 80 DEGREES
P-R INTERVAL: 144 MS
POTASSIUM SERPL-SCNC: 4.4 MMOL/L (ref 3.5–5.1)
Q-T INTERVAL: 470 MS
QRS DURATION: 90 MS
QTC CALCULATION (BEZET): 449 MS
R AXIS: 12 DEGREES
SODIUM SERPL-SCNC: 142 MMOL/L (ref 136–145)
T AXIS: 37 DEGREES
VENTRICULAR RATE: 55 BPM

## 2025-07-24 PROCEDURE — 80051 ELECTROLYTE PANEL: CPT

## 2025-07-24 PROCEDURE — 93010 ELECTROCARDIOGRAM REPORT: CPT | Performed by: INTERNAL MEDICINE

## 2025-07-24 PROCEDURE — 93005 ELECTROCARDIOGRAM TRACING: CPT

## 2025-07-24 PROCEDURE — 36415 COLL VENOUS BLD VENIPUNCTURE: CPT

## 2025-07-31 ENCOUNTER — HOSPITAL ENCOUNTER (OUTPATIENT)
Facility: HOSPITAL | Age: 65
Setting detail: HOSPITAL OUTPATIENT SURGERY
Discharge: HOME | End: 2025-07-31
Attending: ORTHOPAEDIC SURGERY | Admitting: ORTHOPAEDIC SURGERY

## 2025-07-31 ENCOUNTER — ANESTHESIA EVENT (OUTPATIENT)
Dept: SURGERY | Facility: HOSPITAL | Age: 65
End: 2025-07-31
Payer: MEDICARE

## 2025-07-31 ENCOUNTER — ANESTHESIA (OUTPATIENT)
Dept: SURGERY | Facility: HOSPITAL | Age: 65
End: 2025-07-31
Payer: MEDICARE

## 2025-07-31 VITALS
SYSTOLIC BLOOD PRESSURE: 106 MMHG | WEIGHT: 171.94 LBS | DIASTOLIC BLOOD PRESSURE: 70 MMHG | HEART RATE: 61 BPM | RESPIRATION RATE: 18 BRPM | BODY MASS INDEX: 26.99 KG/M2 | TEMPERATURE: 98 F | HEIGHT: 67 IN | OXYGEN SATURATION: 95 %

## 2025-07-31 DIAGNOSIS — M65.351 TRIGGER LITTLE FINGER OF RIGHT HAND: ICD-10-CM

## 2025-07-31 DIAGNOSIS — M65.321 TRIGGER INDEX FINGER OF RIGHT HAND: Primary | ICD-10-CM

## 2025-07-31 DIAGNOSIS — G89.18 POST-OP PAIN: ICD-10-CM

## 2025-07-31 DIAGNOSIS — M65.331 TRIGGER MIDDLE FINGER OF RIGHT HAND: ICD-10-CM

## 2025-07-31 DIAGNOSIS — M65.341 TRIGGER RING FINGER OF RIGHT HAND: ICD-10-CM

## 2025-07-31 RX ORDER — HYDROMORPHONE HYDROCHLORIDE 1 MG/ML
0.6 INJECTION, SOLUTION INTRAMUSCULAR; INTRAVENOUS; SUBCUTANEOUS EVERY 5 MIN PRN
Status: DISCONTINUED | OUTPATIENT
Start: 2025-07-31 | End: 2025-07-31

## 2025-07-31 RX ORDER — SODIUM CHLORIDE, SODIUM LACTATE, POTASSIUM CHLORIDE, CALCIUM CHLORIDE 600; 310; 30; 20 MG/100ML; MG/100ML; MG/100ML; MG/100ML
INJECTION, SOLUTION INTRAVENOUS CONTINUOUS
Status: DISCONTINUED | OUTPATIENT
Start: 2025-07-31 | End: 2025-07-31

## 2025-07-31 RX ORDER — HYDROCODONE BITARTRATE AND ACETAMINOPHEN 5; 325 MG/1; MG/1
1 TABLET ORAL EVERY 4 HOURS PRN
Qty: 15 TABLET | Refills: 0 | Status: SHIPPED | OUTPATIENT
Start: 2025-07-31

## 2025-07-31 RX ORDER — HYDROMORPHONE HYDROCHLORIDE 1 MG/ML
0.4 INJECTION, SOLUTION INTRAMUSCULAR; INTRAVENOUS; SUBCUTANEOUS EVERY 5 MIN PRN
Status: DISCONTINUED | OUTPATIENT
Start: 2025-07-31 | End: 2025-07-31

## 2025-07-31 RX ORDER — NALOXONE HYDROCHLORIDE 0.4 MG/ML
80 INJECTION, SOLUTION INTRAMUSCULAR; INTRAVENOUS; SUBCUTANEOUS AS NEEDED
Status: DISCONTINUED | OUTPATIENT
Start: 2025-07-31 | End: 2025-07-31

## 2025-07-31 RX ORDER — HYDROMORPHONE HYDROCHLORIDE 1 MG/ML
0.2 INJECTION, SOLUTION INTRAMUSCULAR; INTRAVENOUS; SUBCUTANEOUS EVERY 5 MIN PRN
Status: DISCONTINUED | OUTPATIENT
Start: 2025-07-31 | End: 2025-07-31

## 2025-07-31 RX ORDER — MIDAZOLAM HYDROCHLORIDE 1 MG/ML
INJECTION INTRAMUSCULAR; INTRAVENOUS AS NEEDED
Status: DISCONTINUED | OUTPATIENT
Start: 2025-07-31 | End: 2025-07-31 | Stop reason: SURG

## 2025-07-31 RX ORDER — HYDROCODONE BITARTRATE AND ACETAMINOPHEN 5; 325 MG/1; MG/1
2 TABLET ORAL ONCE AS NEEDED
Status: DISCONTINUED | OUTPATIENT
Start: 2025-07-31 | End: 2025-07-31

## 2025-07-31 RX ORDER — HYDROCODONE BITARTRATE AND ACETAMINOPHEN 5; 325 MG/1; MG/1
1 TABLET ORAL ONCE AS NEEDED
Status: DISCONTINUED | OUTPATIENT
Start: 2025-07-31 | End: 2025-07-31

## 2025-07-31 RX ORDER — ACETAMINOPHEN 500 MG
1000 TABLET ORAL ONCE AS NEEDED
Status: DISCONTINUED | OUTPATIENT
Start: 2025-07-31 | End: 2025-07-31

## 2025-07-31 RX ORDER — BUPIVACAINE HYDROCHLORIDE 5 MG/ML
INJECTION, SOLUTION EPIDURAL; INTRACAUDAL; PERINEURAL AS NEEDED
Status: DISCONTINUED | OUTPATIENT
Start: 2025-07-31 | End: 2025-07-31 | Stop reason: HOSPADM

## 2025-07-31 RX ORDER — SCOPOLAMINE 1 MG/3D
1 PATCH, EXTENDED RELEASE TRANSDERMAL ONCE
Status: DISCONTINUED | OUTPATIENT
Start: 2025-07-31 | End: 2025-07-31 | Stop reason: HOSPADM

## 2025-07-31 RX ORDER — ONDANSETRON 2 MG/ML
INJECTION INTRAMUSCULAR; INTRAVENOUS AS NEEDED
Status: DISCONTINUED | OUTPATIENT
Start: 2025-07-31 | End: 2025-07-31 | Stop reason: SURG

## 2025-07-31 RX ORDER — ONDANSETRON 2 MG/ML
4 INJECTION INTRAMUSCULAR; INTRAVENOUS EVERY 6 HOURS PRN
Status: DISCONTINUED | OUTPATIENT
Start: 2025-07-31 | End: 2025-07-31

## 2025-07-31 RX ORDER — LABETALOL HYDROCHLORIDE 5 MG/ML
5 INJECTION, SOLUTION INTRAVENOUS EVERY 5 MIN PRN
Status: DISCONTINUED | OUTPATIENT
Start: 2025-07-31 | End: 2025-07-31

## 2025-07-31 RX ORDER — EPHEDRINE SULFATE 50 MG/ML
INJECTION INTRAVENOUS AS NEEDED
Status: DISCONTINUED | OUTPATIENT
Start: 2025-07-31 | End: 2025-07-31 | Stop reason: SURG

## 2025-07-31 RX ORDER — ACETAMINOPHEN 500 MG
1000 TABLET ORAL ONCE
Status: DISCONTINUED | OUTPATIENT
Start: 2025-07-31 | End: 2025-07-31 | Stop reason: HOSPADM

## 2025-07-31 RX ORDER — MEPERIDINE HYDROCHLORIDE 25 MG/ML
12.5 INJECTION INTRAMUSCULAR; INTRAVENOUS; SUBCUTANEOUS AS NEEDED
Status: DISCONTINUED | OUTPATIENT
Start: 2025-07-31 | End: 2025-07-31

## 2025-07-31 RX ORDER — METOCLOPRAMIDE HYDROCHLORIDE 5 MG/ML
10 INJECTION INTRAMUSCULAR; INTRAVENOUS EVERY 8 HOURS PRN
Status: DISCONTINUED | OUTPATIENT
Start: 2025-07-31 | End: 2025-07-31

## 2025-07-31 RX ORDER — LIDOCAINE HYDROCHLORIDE 10 MG/ML
INJECTION, SOLUTION EPIDURAL; INFILTRATION; INTRACAUDAL; PERINEURAL AS NEEDED
Status: DISCONTINUED | OUTPATIENT
Start: 2025-07-31 | End: 2025-07-31 | Stop reason: SURG

## 2025-07-31 RX ORDER — DIPHENHYDRAMINE HYDROCHLORIDE 50 MG/ML
12.5 INJECTION, SOLUTION INTRAMUSCULAR; INTRAVENOUS AS NEEDED
Status: DISCONTINUED | OUTPATIENT
Start: 2025-07-31 | End: 2025-07-31

## 2025-07-31 RX ADMIN — SODIUM CHLORIDE, SODIUM LACTATE, POTASSIUM CHLORIDE, CALCIUM CHLORIDE: 600; 310; 30; 20 INJECTION, SOLUTION INTRAVENOUS at 14:15:00

## 2025-07-31 RX ADMIN — EPHEDRINE SULFATE 5 MG: 50 INJECTION INTRAVENOUS at 14:32:00

## 2025-07-31 RX ADMIN — ONDANSETRON 4 MG: 2 INJECTION INTRAMUSCULAR; INTRAVENOUS at 14:17:00

## 2025-07-31 RX ADMIN — MIDAZOLAM HYDROCHLORIDE 2 MG: 1 INJECTION INTRAMUSCULAR; INTRAVENOUS at 14:17:00

## 2025-07-31 RX ADMIN — LIDOCAINE HYDROCHLORIDE 50 MG: 10 INJECTION, SOLUTION EPIDURAL; INFILTRATION; INTRACAUDAL; PERINEURAL at 14:24:00

## 2025-08-01 ENCOUNTER — TELEPHONE (OUTPATIENT)
Dept: PHYSICAL THERAPY | Age: 65
End: 2025-08-01

## 2025-08-04 ENCOUNTER — TELEPHONE (OUTPATIENT)
Dept: PHYSICAL THERAPY | Facility: HOSPITAL | Age: 65
End: 2025-08-04

## 2025-08-05 ENCOUNTER — OFFICE VISIT (OUTPATIENT)
Dept: OCCUPATIONAL MEDICINE | Age: 65
End: 2025-08-05
Attending: PHYSICIAN ASSISTANT

## 2025-08-05 DIAGNOSIS — M65.341 TRIGGER RING FINGER OF RIGHT HAND: ICD-10-CM

## 2025-08-05 DIAGNOSIS — M65.351 TRIGGER LITTLE FINGER OF RIGHT HAND: ICD-10-CM

## 2025-08-05 DIAGNOSIS — M65.331 TRIGGER MIDDLE FINGER OF RIGHT HAND: ICD-10-CM

## 2025-08-05 DIAGNOSIS — M65.321 TRIGGER INDEX FINGER OF RIGHT HAND: Primary | ICD-10-CM

## 2025-08-05 PROCEDURE — 97165 OT EVAL LOW COMPLEX 30 MIN: CPT

## 2025-08-05 PROCEDURE — 97760 ORTHOTIC MGMT&TRAING 1ST ENC: CPT

## 2025-08-05 PROCEDURE — 97110 THERAPEUTIC EXERCISES: CPT

## 2025-08-08 ENCOUNTER — OFFICE VISIT (OUTPATIENT)
Dept: OCCUPATIONAL MEDICINE | Age: 65
End: 2025-08-08
Attending: PHYSICIAN ASSISTANT

## 2025-08-08 PROCEDURE — 97530 THERAPEUTIC ACTIVITIES: CPT

## 2025-08-08 PROCEDURE — 97140 MANUAL THERAPY 1/> REGIONS: CPT

## 2025-08-08 PROCEDURE — 97035 APP MDLTY 1+ULTRASOUND EA 15: CPT

## 2025-08-11 ENCOUNTER — TELEPHONE (OUTPATIENT)
Dept: PHYSICAL THERAPY | Age: 65
End: 2025-08-11

## 2025-08-14 ENCOUNTER — OFFICE VISIT (OUTPATIENT)
Dept: OCCUPATIONAL MEDICINE | Age: 65
End: 2025-08-14
Attending: PHYSICIAN ASSISTANT

## 2025-08-14 PROCEDURE — 97530 THERAPEUTIC ACTIVITIES: CPT

## 2025-08-14 PROCEDURE — 97760 ORTHOTIC MGMT&TRAING 1ST ENC: CPT

## 2025-08-14 PROCEDURE — 97035 APP MDLTY 1+ULTRASOUND EA 15: CPT

## 2025-08-15 ENCOUNTER — OFFICE VISIT (OUTPATIENT)
Dept: ORTHOPEDICS CLINIC | Facility: CLINIC | Age: 65
End: 2025-08-15

## 2025-08-15 VITALS — BODY MASS INDEX: 26.84 KG/M2 | WEIGHT: 171 LBS | HEIGHT: 67 IN

## 2025-08-15 DIAGNOSIS — M65.331 TRIGGER MIDDLE FINGER OF RIGHT HAND: ICD-10-CM

## 2025-08-15 DIAGNOSIS — M65.321 TRIGGER INDEX FINGER OF RIGHT HAND: Primary | ICD-10-CM

## 2025-08-15 DIAGNOSIS — M65.351 TRIGGER LITTLE FINGER OF RIGHT HAND: ICD-10-CM

## 2025-08-15 DIAGNOSIS — M65.341 TRIGGER RING FINGER OF RIGHT HAND: ICD-10-CM

## 2025-08-15 PROCEDURE — 99024 POSTOP FOLLOW-UP VISIT: CPT | Performed by: PHYSICIAN ASSISTANT

## 2025-08-15 PROCEDURE — 3008F BODY MASS INDEX DOCD: CPT | Performed by: PHYSICIAN ASSISTANT

## 2025-08-19 ENCOUNTER — OFFICE VISIT (OUTPATIENT)
Dept: OCCUPATIONAL MEDICINE | Age: 65
End: 2025-08-19
Attending: PHYSICIAN ASSISTANT

## 2025-08-19 PROCEDURE — 97035 APP MDLTY 1+ULTRASOUND EA 15: CPT

## 2025-08-19 PROCEDURE — 97110 THERAPEUTIC EXERCISES: CPT

## 2025-08-19 PROCEDURE — 97760 ORTHOTIC MGMT&TRAING 1ST ENC: CPT

## 2025-08-21 ENCOUNTER — OFFICE VISIT (OUTPATIENT)
Dept: OCCUPATIONAL MEDICINE | Age: 65
End: 2025-08-21
Attending: PHYSICIAN ASSISTANT

## 2025-08-21 PROCEDURE — 97760 ORTHOTIC MGMT&TRAING 1ST ENC: CPT

## 2025-08-21 PROCEDURE — 97110 THERAPEUTIC EXERCISES: CPT

## 2025-08-21 PROCEDURE — 97035 APP MDLTY 1+ULTRASOUND EA 15: CPT

## 2025-08-26 ENCOUNTER — OFFICE VISIT (OUTPATIENT)
Dept: OCCUPATIONAL MEDICINE | Age: 65
End: 2025-08-26
Attending: ORTHOPAEDIC SURGERY

## 2025-08-26 PROCEDURE — 97530 THERAPEUTIC ACTIVITIES: CPT

## 2025-08-26 PROCEDURE — 97035 APP MDLTY 1+ULTRASOUND EA 15: CPT

## 2025-08-28 ENCOUNTER — OFFICE VISIT (OUTPATIENT)
Dept: OCCUPATIONAL MEDICINE | Age: 65
End: 2025-08-28
Attending: PHYSICIAN ASSISTANT

## 2025-08-28 PROCEDURE — 97035 APP MDLTY 1+ULTRASOUND EA 15: CPT

## 2025-08-28 PROCEDURE — 97530 THERAPEUTIC ACTIVITIES: CPT

## (undated) DIAGNOSIS — G89.29 OTHER CHRONIC PAIN: ICD-10-CM

## (undated) DIAGNOSIS — I10 ESSENTIAL HYPERTENSION: ICD-10-CM

## (undated) DIAGNOSIS — G62.9 NEUROPATHY: ICD-10-CM

## (undated) DIAGNOSIS — I10 ESSENTIAL HYPERTENSION: Chronic | ICD-10-CM

## (undated) DIAGNOSIS — R94.131 ABNORMAL EMG: Primary | ICD-10-CM

## (undated) DIAGNOSIS — E78.00 PURE HYPERCHOLESTEROLEMIA: ICD-10-CM

## (undated) DEVICE — SPONGE GZ 4XL4IN 100% COT 12 PLY TYP VII WVN

## (undated) DEVICE — SURGICAL GLV PROTEXIS PI 6.5.

## (undated) DEVICE — STERILE POLYISOPRENE POWDER-FREE SURGICAL GLOVES: Brand: PROTEXIS

## (undated) DEVICE — PADDING CAST W3INXL4YD 100% COT ABSRB HIGHLY

## (undated) DEVICE — DRAPE STERI 1000 UTIL AD

## (undated) DEVICE — PADDING CAST 4INX4YD 100% COT SFT SLF BOND

## (undated) DEVICE — UPPER EXTREMITY CDS-LF: Brand: MEDLINE INDUSTRIES, INC.

## (undated) DEVICE — SLEEVE COMPR M KNEE LEN SGL USE KENDALL SCD

## (undated) DEVICE — VIOLET BRAIDED (POLYGLACTIN 910), SYNTHETIC ABSORBABLE SUTURE: Brand: COATED VICRYL

## (undated) DEVICE — MINI-BLADE®: Brand: BEAVER®

## (undated) DEVICE — OINTMENT SKIN 3 ANTIBIO BACI ZN NEOMYCN SULF

## (undated) DEVICE — SUTURE MCRYL 3-0 27IN ABSRB UD 19MM PS-2 3/8

## (undated) DEVICE — SUT ETHLN 4-0 18IN NABSRB BLK 19MM PS

## (undated) DEVICE — SOLUTION ANTIFOG W/ ADH BK FOAM SPNG RADPQ

## (undated) DEVICE — GLOVE SUR 7.5 PROTEXIS PI PIP CRM PWD F

## (undated) DEVICE — Device

## (undated) DEVICE — SLEEVE COMPR MD KNEE LEN SGL USE KENDALL SCD

## (undated) DEVICE — STRIP SKIN CLSR W0.5XL4IN REINF NINVAS DSGN

## (undated) DEVICE — STERILE POLYISOPRENE POWDER-FREE SURGICAL GLOVES WITH EMOLLIENT COATING: Brand: PROTEXIS

## (undated) DEVICE — INTENDED TO BE USED TO OCCLUDE, RETRACT AND IDENTIFY ARTERIES, VEINS, TENDONS AND NERVES IN SURGICAL PROCEDURES: Brand: STERION®  VESSEL LOOP

## (undated) DEVICE — DRESSING PETRO W3XL3IN OIL EMUL N ADH GZ KNIT

## (undated) DEVICE — ADHESIVE SKIN TOP FOR WND CLSR DERMBND ADV

## (undated) DEVICE — SOLUTION RUBBING 4OZ 70% ISO ALC CLR

## (undated) DEVICE — SUTURE PROLENE 6-0 RB-2

## (undated) DEVICE — SUTURE ETHLN SZ 4-0 L18IN NABSRB BLK L19MM

## (undated) DEVICE — 3M™ STERI-STRIP™ REINFORCED ADHESIVE SKIN CLOSURES, R1547, 1/2 IN X 4 IN (12 MM X 100 MM), 6 STRIPS/ENVELOPE: Brand: 3M™ STERI-STRIP™

## (undated) DEVICE — PACK UPPER EXTREMITY

## (undated) DEVICE — SUTURE MCRYL SZ 4-0 L18IN ABSRB UD L19MM PS-2

## (undated) DEVICE — BLDE SURG MINI GRN GRINDLESS ROUNDED TIP SHRP

## (undated) DEVICE — GLOVE SUR 7 PROTEXIS PI PIP CRM PWD F

## (undated) DEVICE — SLING ORTH MED 15X8.5IN 32IN

## (undated) DEVICE — CUFF TRNQT 3IN X 18IN RED CYL SGL BLDR 2 PRT

## (undated) DEVICE — SOLUTION IRRIG 1000ML 0.9% NACL USP BTL

## (undated) DEVICE — SUTURE VCRL SZ 0 L27IN ABSRB VLT L26MM UR-6

## (undated) DEVICE — GOWN SURG XL REINF SMS LEV 3 BLU SIRUS SET

## (undated) DEVICE — FRCP CORD DISP BIPLR 4IN

## (undated) DEVICE — DRESSING ADAPTIC L3XW3IN OIL ADH

## (undated) DEVICE — GLOVE SUR 7.5 SENSICARE PI PIP CRM PWD F

## (undated) DEVICE — SUTURE MCRYL 4-0 18IN ABSRB UD 19MM PS-2 3/8

## (undated) DEVICE — DISPOSABLE TOURNIQUET CUFF SINGLE BLADDER, DUAL PORT AND QUICK CONNECT CONNECTOR: Brand: COLOR CUFF

## (undated) DEVICE — GLOVE SUR 6.5 SENSICARE PI PIP CRM PWD F

## (undated) DEVICE — ANTISEPTIC 4OZ 70% ISO ALC

## (undated) DEVICE — DISPOSABLE BIPOLAR FORCEPS 4" (10.2CM) JEWELERS, STRAIGHT 0.4MM TIP AND 12 FT. (3.6M) CABLE: Brand: KIRWAN

## (undated) DEVICE — GOWN,SIRUS,FABRIC-REINFORCED,X-LARGE: Brand: MEDLINE

## (undated) DEVICE — GLOVE SUR 7.5 SENSICARE PI PIP GRN PWD F

## (undated) DEVICE — GLOVE SUR 7 SENSICARE PI PIP CRM PWD F

## (undated) DEVICE — DRESSING TRNSPAR W2XL2.75IN IS HYPOALRG

## (undated) DEVICE — SUTURE ETHLN 4-0 18IN NABSRB BLK 19MM PS-2

## (undated) DEVICE — SRG GLV PROTEXIS BLUE 7.5

## (undated) NOTE — LETTER
[address]  [address]  Authorization for Surgical Operation and Procedure     Date:___________                                                                                                         Time:__________  I hereby authorize Surgeon(s):  Amilcar Dawn MD, my physician and his/her assistants (if applicable), which may include medical students, residents, and/or fellows, to perform the following surgical operation/ procedure and administer such anesthesia as may be determined necessary by my physician:  Operation/Procedure name (s) Procedure(s):  LEFT ENDOSCOPIC CARPAL TUNNEL RELEASE, LEFT CUBITAL TUNNEL RELEASE. A1 PULLEY RELEASE OF LEFT INDEX, MIDDLE AND RING FINGER  CUBITAL TUNNEL RELEASE on Radhagertrude Jones   2.   I recognize that during the surgical operation/procedure, unforeseen conditions may necessitate additional or different procedures than those listed above.  I, therefore, further authorize and request that the above-named surgeon, assistants, or designees perform such procedures as are, in their judgment, necessary and desirable.    3.   My surgeon/physician has discussed prior to my surgery the potential benefits, risks and side effects of this procedure; the likelihood of achieving goals; and potential problems that might occur during recuperation.  They also discussed reasonable alternatives to the procedure, including risks, benefits, and side effects related to the alternatives and risks related to not receiving this procedure.  I have had all my questions answered and I acknowledge that no guarantee has been made as to the result that may be obtained.    4.   Should the need arise during my operation/procedure, which includes change of level of care prior to discharge, I also consent to the administration of blood and/or blood products.  Further, I understand that despite careful testing and screening of blood or blood products by collecting agencies,  I may still be subject to ill effects as a result of receiving a blood transfusion and/or blood products.  The following are some, but not all, of the potential risks that can occur: fever and allergic reactions, hemolytic reactions, transmission of diseases such as Hepatitis, AIDS and Cytomegalovirus (CMV) and fluid overload.  In the event that I wish to have an autologous transfusion of my own blood, or a directed donor transfusion, I will discuss this with my physician.  Check only if Refusing Blood or Blood Products  I understand refusal of blood or blood products as deemed necessary by my physician may have serious consequences to my condition to include possible death. I hereby assume responsibility for my refusal and release the hospital, its personnel, and my physicians from any responsibility for the consequences of my refusal.          o  Refuse      5.   I authorize the use of any specimen, organs, tissues, body parts or foreign objects that may be removed from my body during the operation/procedure for diagnosis, research or teaching purposes and their subsequent disposal by hospital authorities.  I also authorize the release of specimen test results and/or written reports to my treating physician on the hospital medical staff or other referring or consulting physicians involved in my care, at the discretion of the Pathologist or my treating physician.    6.   I consent to the photographing or videotaping of the operations or procedures to be performed, including appropriate portions of my body for medical, scientific, or educational purposes, provided my identity is not revealed by the pictures or by descriptive texts accompanying them.  If the procedure has been photographed/videotaped, the surgeon will obtain the original picture, image, videotape or CD.  The hospital will not be responsible for storage, release or maintenance of the picture, image, tape or CD.    7.   I consent to the presence of a   or observers in the operating room as deemed necessary by my physician or their designees.    8.   I recognize that in the event my procedure results in extended X-Ray/fluoroscopy time, I may develop a skin reaction.    9. If I have a Do Not Attempt Resuscitation (DNAR) order in place, that status will be suspended while in the operating room, procedural suite, and during the recovery period unless otherwise explicitly stated by me (or a person authorized to consent on my behalf). The surgeon or my attending physician will determine when the applicable recovery period ends for purposes of reinstating the DNAR order.  10. Patients having a sterilization procedure: I understand that if the procedure is successful the results will be permanent and it will therefore be impossible for me to inseminate, conceive, or bear children.  I also understand that the procedure is intended to result in sterility, although the result has not been guaranteed.   11. I acknowledge that my physician has explained sedation/analgesia administration to me including the risk and benefits I consent to the administration of sedation/analgesia as may be necessary or desirable in the judgment of my physician.    I CERTIFY THAT I HAVE READ AND FULLY UNDERSTAND THE ABOVE CONSENT TO OPERATION and/or OTHER PROCEDURE.    _________________________________________  __________________________________  Signature of Patient     Signature of Responsible Person         ___________________________________         Printed Name of Responsible Person           _________________________________                 Relationship to Patient  _________________________________________  ______________________________  Signature of Witness          Date  Time      Patient Name: Radha Jones     : 1960                 Printed: 2024     Medical Record #: [id number]                     Page 2 of 3                                     [address]  [address]    Consent for Anesthesia    I, Radha Jones agree to be cared for by an anesthesiologist, who is specially trained to monitor me and give me medicine to put me to sleep or keep me comfortable during my procedure    I understand that my anesthesiologist is not an employee or agent of [facility] or [email or web address]. He or she works for [employer].    As the patient asking for anesthesia services, I agree to:  Allow the anesthesiologist (anesthesia doctor) to give me medicine and do additional procedures as necessary. Some examples are: Starting or using an “IV” to give me medicine, fluids or blood during my procedure, and having a breathing tube placed to help me breathe when I’m asleep (intubation). In the event that my heart stops working properly, I understand that my anesthesiologist will make every effort to sustain my life, unless otherwise directed by [facility] Do Not Resuscitate documents.  Tell my anesthesia doctor before my procedure:  If I am pregnant.  The last time that I ate or drank.  All of the medicines I take (including prescriptions, herbal supplements, and pills I can buy without a prescription (including street drugs/illegal medications). Failure to inform my anesthesiologist about these medicines may increase my risk of anesthetic complications.  If I am allergic to anything or have had a reaction to anesthesia before.  I understand how the anesthesia medicine will help me (benefits).  I understand that with any type of anesthesia medicine there are risks:  The most common risks are: nausea, vomiting, sore throat, muscle soreness, damage to my eyes, mouth, or teeth (from breathing tube placement).  Rare risks include: remembering what happened during my procedure, allergic reactions to medications, injury to my airway, heart, lungs, vision, nerves, or muscles and in extremely rare instances  death.  My doctor has explained to me other choices available to me for my care (alternatives).  Pregnant Patients (“epidural”):  I understand that the risks of having an epidural (medicine given into my back to help control pain during labor), include itching, low blood pressure, difficulty urinating, headache or slowing of the baby’s heart. Very rare risks include infection, bleeding, seizure, irregular heart rhythms and nerve injury.  Regional Anesthesia (“spinal”, “epidural”, & “nerve blocks”):  I understand that rare but potential complications include headache, bleeding, infection, seizure, irregular heart rhythms, and nerve injury.    I can change my mind about having anesthesia services at any time before I get the medicine.    _____________________________________________________________________________  Patient (or Representative) Signature/Relationship to Patient  Date   Time    _____________________________________________________________________________   Name (if used)    Language/Organization   Time    _____________________________________________________________________________  Anesthesiologist Signature     Date   Time  I have discussed the procedure and information above with the patient (or patient’s representative) and answered their questions. The patient or their representative has agreed to have anesthesia services.    _____________________________________________________________________________  Witness        Date   Time  I have verified that the signature is that of the patient or patient’s representative, and that it was signed before the procedure  Patient Name: Radha Jones     : 1960                 Printed: 2024     Medical Record #: [id number]                     Page 3 of 3

## (undated) NOTE — MR AVS SNAPSHOT
[place]  [facility] [address]  [address] [facility] [address] [phone]               Thank you for choosing us for your health care visit with Abelardo Kern MD.  We are glad to serve you and happy to provide you with this smith Apply  topically every 2 (two) hours.  For cold sores only as needed           furosemide 80 MG Tabs   TAKE 1 TABLET BY MOUTH EVERY DAY   Commonly known as:  LASIX           HYDROcodone-acetaminophen  MG Tabs   Take 1 tablet by mouth 3 (three) times d

## (undated) NOTE — MR AVS SNAPSHOT
[place]  [address] [facility] 100  [address] [facility] [address] [phone]               Thank you for choosing us for your health care visit with Tk Martinez MD.  We are glad to serve you and happy to provide you with this smith Cyclobenzaprine HCl 10 MG Tabs   Take 1 tablet (10 mg total) by mouth 3 (three) times daily as needed for Muscle spasms.    Commonly known as:  cyclobenzaprine           DENAVIR 1 % Crea   Generic drug:  Penciclovir   Apply  topically every 2 (two)

## (undated) NOTE — LETTER
Date: 4/25/2023    Patient Name: Christin March          To Whom it may concern: This letter has been written at the patient's request. The above patient was seen at the [facility] for treatment of a medical condition. This patient should be excused from attending work/school from 4/25/23 through 4/27/23.     Further work status to be determined by her orthopedic specialist.        Sincerely,    Stefanie Turner PA-C

## (undated) NOTE — LETTER
Patient Name: Jah Figueroa  YOB: 1960          MRN number:  [id number]  Date:  10/22/2018  Referring Physician:  Kirk Conklin          Physical Therapy  EVALUATION:    Referring Physician: Dr. Ty Rodrigues of lumbar or lumbo neg. Findings suggest soft tissue restriction as contributing factor to pain report  Gris Mon would benefit from skilled Physical Therapy to address the above impairments to improve lumbar segmental motion to L and ITB mobility L    Precautions:  spinal stimu Patient/Family/Caregiver was advised of these findings, precautions, and treatment options and has agreed to actively participate in planning and for this course of care.     Thank you for your referral. Please co-sign or sign and return this letter via fax

## (undated) NOTE — Clinical Note
Julian - I saw Lucía Garcia today with mixed UI. I've recommended bladder testing. I will work to manage her sx. I appreciate the opportunity to participate in her care.  Thanks, [employer]

## (undated) NOTE — LETTER
Date: 8/3/2023    Patient Name: Torri Coronado          To Whom it may concern: This letter has been written at the patient's request. The above patient was seen at the [facility] for treatment of a medical condition. This patient should be excused from attending work until next follow up on 8/16/23        Sincerely,      Bulmaro Melgar MD  Hand, Wrist, & Elbow Surgery  [facility]  [place] [address], [address] [facility], [address] [place] [address]. [facility]  [email or web address]  t: [phone]  f: [phone]

## (undated) NOTE — LETTER
Date: 8/17/2023    Patient Name: Alexis Ortega          To Whom it may concern: This letter has been written at the patient's request. The above patient was seen at the [facility] for treatment of a medical condition. This patient should be excused from attending work until next follow up. Sincerely,        Timbo Sprague PA-C  Hand, Wrist, & Elbow Surgery  Physician Assistant to Dr. Arnoldo Kinsey, [address], [place] [address]. [email or web address]  t: [phone]  f: [phone]

## (undated) NOTE — LETTER
Date & Time: 6/3/2022, 6:09 PM  Patient: James Baugh  Encounter Provider(s):    Shirley Gowers, APRN       To Whom It May Concern:    Daniel Oneal was seen and treated in our department on 6/3/2022. She can return to work with these limitations: Unable to type or utilize right hand if no light duty is available patient should be off until seen by orthopedist next week. If you have any questions or concerns, please do not hesitate to call. Courtney Manning NP-C.   Nurse Practitioner

## (undated) NOTE — LETTER
[address] [facility] 100  [address] [facility]  [phone]            March 19, 2018      Sienna Veras  YOB: 1960      RE: Medical Marijuana - Participating providers / facilities

## (undated) NOTE — LETTER
02/28/19        Jah Figueroa  [address] [place] [phone]      Dear Kvng Vázquez records indicate that you have outstanding lab work and or testing that was ordered for you and has not yet been completed: Fasting lab work  To complete the

## (undated) NOTE — LETTER
Patient Name: Cristofer Luo  YOB: 1960          MRN number:  [id number]  Date:  11/19/2018  Referring Physician:  James Dubon     Discharge Summary    Pt has attended 6 cancelled 2, and no shown 1 visits in Physical Therapy.    Subjective: felt

## (undated) NOTE — LETTER
[address]  [id number]  [place] [address]  [facility]: [phone]  FAX: [phone]   Consent to Procedure/Sedation    Date: __10/30/2019_____    Time: ___1:07 PM ___    1.  I authorize the performanc ___________________________    Signature of person authorized to consent for patient: Relationship to patient:  ___________________________    ___________________    Witness: _[facility] RN___________________  Date: __10/30/19 1:30 pm____________    Deryl Ray

## (undated) NOTE — Clinical Note
Date: 5/26/2017    Patient Name: Gordon Paige          To Whom it may concern: This letter has been written at the patient's request. The above patient was seen at the [facility] for treatment of a medical condition.   Patient should refrai

## (undated) NOTE — MR AVS SNAPSHOT
[place]  [address] [facility] [address]  [address] [facility] [phone]               Thank you for choosing us for your health care visit with Maico Osman MD.  We are glad to serve you and happy to provide you with this smith What changed: You were already taking a medication with the same name, and this prescription was added. Make sure you understand how and when to take each. Amoxicillin-Pot Clavulanate 500-125 MG Tabs   Take 1 tablet by mouth 2 (two) times daily. * Notice: This list has 2 medication(s) that are the same as other medications prescribed for you. Read the directions carefully, and ask your doctor or other care provider to review them with you.          Where to Get Your Medications      These medicat

## (undated) NOTE — MR AVS SNAPSHOT
[place]  [address] [facility]  [address] [facility] [id number] [phone]               Thank you for choosing us for your health care visit with Amna Rabago MD.  We are glad to serve you and happy to provide you with this smith atorvastatin 10 MG Tabs   Take 1 tablet (10 mg total) by mouth daily. Commonly known as:  LIPITOR           Cyclobenzaprine HCl 10 MG Tabs   Take 1 tablet (10 mg total) by mouth 3 (three) times daily as needed for Muscle spasms.    Commonly known as:  [address] [place] [address]     Phone:  [phone]    - levofloxacin 500 MG Tabs  - predniSONE 5 MG Tabs            Results of Recent Testing     ELECTROCARDIOGRAM, COMPLETE             MyChart                  Visit [facility]

## (undated) NOTE — LETTER
Date: 5/12/2022    Patient Name: Audrey Sanchez          To Whom it may concern: This letter has been written at the patient's request. The above patient was seen at the [facility] for treatment of a medical condition.     This patient should be excused from attending work/school until evaluated by the neurologist.       Sincerely,      Iman Loyd MD

## (undated) NOTE — MR AVS SNAPSHOT
[place]  [address] [facility] [address]  [address] [facility] [address] [phone]               Thank you for choosing us for your health care visit with LULA Gallegos.   We are glad to serve you and happy to provide you with this smith Sinus Problem           Medical Issues Discussed Today     Chronic rhinitis    -  Primary      Instructions and Information about Your Health    Please start Flonase - 2 sprays in each nostril daily.     Please start Astelin - 2 sprays in each nostril TWIC Commonly known as:  ZOFRAN           Pantoprazole Sodium 40 MG Tbec   Take 1 tablet (40 mg total) by mouth every morning before breakfast.   Commonly known as:  PROTONIX           Penciclovir 1 % Crea   Apply  topically every 2 (two) hours.  For cold sores

## (undated) NOTE — MR AVS SNAPSHOT
After Visit Summary   7/26/2022    Williams Samson   MRN: [id number]           Visit Information     Date & Time  7/26/2022  3:00 PM Provider  Shaquille Hernandez, [address] [place] [address] Neurodiagnostics Dept. Phone  [phone]      Allergies as of 7/26/2022  Review status set to Review Complete on 6/15/2022       Noted Reaction Type Reactions    Gabapentin 10/20/2014    SWELLING    Swelling ankles    Motrin [ibuprofen] 06/30/2013   Systemic     Edema     Other 02/18/2014        IVP dye -unknown reation      Your Current Medications        Dosage    HYDROcodone-acetaminophen (NORCO)  MG Oral Tab Take 1 tablet by mouth every 8 (eight) hours as needed for Pain. nortriptyline 25 MG Oral Cap Take 1 capsule (25 mg total) by mouth nightly. PREVIDENT 5000 BOOSTER PLUS 1.1 % Dental Paste Take 1 Bottle by mouth As Directed. metoprolol succinate ER 50 MG Oral Tablet 24 Hr Take 1 tablet (50 mg total) by mouth daily. furosemide 40 MG Oral Tab Take 1 tablet (40 mg total) by mouth daily. potassium chloride 20 MEQ Oral Tab CR Take 1 tablet (20 mEq total) by mouth daily. ATORVASTATIN 20 MG Oral Tab TAKE 1 TABLET(20 MG) BY MOUTH EVERY NIGHT    PANTOPRAZOLE 40 MG Oral Tab EC TAKE 1 TABLET(40 MG) BY MOUTH EVERY DAY 30 MINUTES BEFORE BREAKFAST    valACYclovir 1 G Oral Tab Take 1 tablet (1,000 mg total) by mouth 2 (two) times a day. ondansetron (ZOFRAN) 4 mg tablet Take 1 tablet (4 mg total) by mouth every 8 (eight) hours as needed for Nausea. Penciclovir (DENAVIR) 1 % External Cream Apply 1 Application topically every 2 (two) hours as needed. Oxybutynin Chloride ER (DITROPAN XL) 10 MG Oral Tablet 24 Hr Take 1 tablet (10 mg total) by mouth daily. LIDOCAINE 5 % External Patch APPLY 1 PATCH EXTERNALLY TO THE SKIN EVERY DAY AS DIRECTED    Calcium Polycarbophil (FIBER) 625 MG Oral Tab Take by mouth.     Dicyclomine HCl 10 MG Oral Cap Take 1 capsule (10 mg total) by mouth 3 (three) times daily as needed. Naloxone HCl 4 MG/0.1ML Nasal Liquid 4 mg by Nasal route as needed. If patient remains unresponsive, repeat dose in other nostril 2-5 minutes after first dose. Diagnoses for This Visit    Bilateral carpal tunnel syndrome   [584308]    Left arm numbness   [493378]             We Ordered the Following     Normal Orders This Visit    [facility] [NEU5 CUSTOM]       Future Appointments        Provider Department    9/14/2022 3:40 PM Ayush, [address]                Did you know that [facility] primary care physicians now offer Video Visits through [address] for adult patients for a variety of conditions such as allergies, back pain and cold symptoms? Skip the drive and waiting room and online chat with a doctor face-to-face using your web-cam enabled computer or mobile device wherever you are. Video Visits cost $50 and can be paid hassle-free using a credit, debit, or health savings card. Not active on [employer]? Ask us how to get signed up today! If you receive a survey from [employer], please take a few minutes to complete it and provide feedback. We strive to deliver the best patient experience and are looking for ways to make improvements. Your feedback will help us do so. For more information on [employer], please visit [email or web address]           No text in SmartText           No text in SmartText

## (undated) NOTE — LETTER
08/08/19        Kirk Castro  [address] [place] [phone]      Dear Margeret Galeazzi records indicate that you have outstanding lab work and or testing that was ordered for you and has not yet been completed: Mammogram - Please contact [facility]

## (undated) NOTE — MR AVS SNAPSHOT
[place]  [address] [facility] [address]  [address] [facility] [address] [phone]               Thank you for choosing us for your health care visit with Sherman Hernandez MD.  We are glad to serve you and happy to provide you with this smith Take 1 tablet by mouth every 8 (eight) hours as needed for Nausea.    Commonly known as:  ZOFRAN           Pantoprazole Sodium 40 MG Tbec   Take 1 tablet (40 mg total) by mouth every morning before breakfast.   Commonly known as:  Bonnie Wesley

## (undated) NOTE — LETTER
11/08/21        Joseph Cruz  [address] [place] [phone]      Dear Apoorva Castillo records indicate that you have outstanding lab work and or testing that was ordered for you and has not yet been completed:  Orders Placed This Encounter

## (undated) NOTE — MR AVS SNAPSHOT
[place]  [address] [facility]  [address] [facility] [place] [place] [address] [phone]               Thank you for choosing us for your health care visit with [facility] 08 NURSE.   We are glad to serve you and happy to provide you with this summary Take 1 tablet (40 mg total) by mouth every morning before breakfast.   Commonly known as:  PROTONIX           Potassium Chloride ER 20 MEQ Tbcr   Take 1 tablet (20 mEq total) by mouth daily.    Commonly known as:  KLOR-CON M20           pregabalin 50 MG Cap

## (undated) NOTE — MR AVS SNAPSHOT
After Visit Summary   4/13/2022    Audrey Sanchez   MRN: [id number]           Visit Information     Date & Time  4/13/2022  3:00 PM Provider  Caro Gupta MD Department  [facility] Dept. Phone  [phone]      Allergies as of 4/13/2022  Review status set to Review Complete on 1/12/2022       Noted Reaction Type Reactions    Gabapentin 10/20/2014    SWELLING    Swelling ankles    Motrin [ibuprofen] 06/30/2013   Systemic     Edema     Other 02/18/2014        IVP dye -unknown reation      Your Current Medications        Dosage    HYDROcodone-acetaminophen (NORCO)  MG Oral Tab Take 1 tablet by mouth every 8 (eight) hours as needed for Pain. ATORVASTATIN 20 MG Oral Tab TAKE 1 TABLET(20 MG) BY MOUTH EVERY NIGHT    METOPROLOL SUCCINATE ER 50 MG Oral Tablet 24 Hr TAKE 1 TABLET(50 MG) BY MOUTH DAILY    PANTOPRAZOLE 40 MG Oral Tab EC TAKE 1 TABLET(40 MG) BY MOUTH EVERY DAY 30 MINUTES BEFORE BREAKFAST    valACYclovir 1 G Oral Tab Take 1 tablet (1,000 mg total) by mouth 2 (two) times a day. ondansetron (ZOFRAN) 4 mg tablet Take 1 tablet (4 mg total) by mouth every 8 (eight) hours as needed for Nausea. FUROSEMIDE 40 MG Oral Tab TAKE 1 TABLET(40 MG) BY MOUTH DAILY    POTASSIUM CHLORIDE ER 20 MEQ Oral Tab CR TAKE 1 TABLET(20 MEQ) BY MOUTH EVERY DAY    Penciclovir (DENAVIR) 1 % External Cream Apply 1 Application topically every 2 (two) hours as needed. Oxybutynin Chloride ER (DITROPAN XL) 10 MG Oral Tablet 24 Hr Take 1 tablet (10 mg total) by mouth daily. LIDOCAINE 5 % External Patch APPLY 1 PATCH EXTERNALLY TO THE SKIN EVERY DAY AS DIRECTED    fluconazole 200 MG Oral Tab     Calcium Polycarbophil (FIBER) 625 MG Oral Tab Take by mouth. Dicyclomine HCl 10 MG Oral Cap Take 1 capsule (10 mg total) by mouth 3 (three) times daily as needed. Naloxone HCl 4 MG/0.1ML Nasal Liquid 4 mg by Nasal route as needed.  If patient remains unresponsive, repeat dose in other nostril 2-5 minutes after first dose. Fluticasone Propionate 50 MCG/ACT Nasal Suspension 2 sprays by Each Nare route daily. Diagnoses for This Visit    Neuropathy   [[id number]]             We Ordered the Following     Normal Orders This Visit    EMG [NEU5 CUSTOM]                 Did you know that [facility] primary care physicians now offer Video Visits through [address] for adult patients for a variety of conditions such as allergies, back pain and cold symptoms? Skip the drive and waiting room and online chat with a doctor face-to-face using your web-cam enabled computer or mobile device wherever you are. Video Visits cost $50 and can be paid hassle-free using a credit, debit, or health savings card. Not active on [employer]? Ask us how to get signed up today! If you receive a survey from [employer], please take a few minutes to complete it and provide feedback. We strive to deliver the best patient experience and are looking for ways to make improvements. Your feedback will help us do so. For more information on [employer], please visit [email or web address]           No text in SmartText           No text in SmartText

## (undated) NOTE — LETTER
02/09/19        Chary Dean  [address] [place] [id number]      Dear Melly Hammond records indicate that you have outstanding lab work and or testing that was ordered for you and has not yet been completed:  Orders Placed This Encounter

## (undated) NOTE — MR AVS SNAPSHOT
[place]  [address] [facility]  [address] [id number] [phone]               Thank you for choosing us for your health care visit with Emilee Diaz MD.  We are glad to serve you and happy to provide you with this s Generic drug:  Penciclovir   Apply  topically every 2 (two) hours.  For cold sores only as needed           furosemide 80 MG Tabs   TAKE 1 TABLET BY MOUTH EVERY DAY   Commonly known as:  LASIX           HYDROcodone-acetaminophen  MG Tabs   Take 1 tabl